# Patient Record
Sex: FEMALE | Race: WHITE | URBAN - METROPOLITAN AREA
[De-identification: names, ages, dates, MRNs, and addresses within clinical notes are randomized per-mention and may not be internally consistent; named-entity substitution may affect disease eponyms.]

---

## 2023-03-04 PROCEDURE — 99239 HOSP IP/OBS DSCHRG MGMT >30: CPT | Performed by: INTERNAL MEDICINE

## 2023-09-28 ENCOUNTER — OFFICE VISIT (OUTPATIENT)
Dept: PRIMARY CARE | Facility: CLINIC | Age: 63
End: 2023-09-28
Payer: COMMERCIAL

## 2023-09-28 VITALS
TEMPERATURE: 97.3 F | HEART RATE: 81 BPM | DIASTOLIC BLOOD PRESSURE: 78 MMHG | BODY MASS INDEX: 16.22 KG/M2 | OXYGEN SATURATION: 96 % | SYSTOLIC BLOOD PRESSURE: 129 MMHG | WEIGHT: 107 LBS | HEIGHT: 68 IN

## 2023-09-28 DIAGNOSIS — K21.9 GASTROESOPHAGEAL REFLUX DISEASE WITHOUT ESOPHAGITIS: ICD-10-CM

## 2023-09-28 DIAGNOSIS — G47.00 INSOMNIA, UNSPECIFIED TYPE: ICD-10-CM

## 2023-09-28 DIAGNOSIS — Z13.820 SCREENING FOR OSTEOPOROSIS: ICD-10-CM

## 2023-09-28 DIAGNOSIS — I10 BENIGN ESSENTIAL HYPERTENSION: ICD-10-CM

## 2023-09-28 DIAGNOSIS — Z12.31 ENCOUNTER FOR SCREENING MAMMOGRAM FOR MALIGNANT NEOPLASM OF BREAST: ICD-10-CM

## 2023-09-28 DIAGNOSIS — R11.0 CHRONIC NAUSEA: ICD-10-CM

## 2023-09-28 DIAGNOSIS — F17.210 CIGARETTE SMOKER: ICD-10-CM

## 2023-09-28 DIAGNOSIS — I10 HYPERTENSION, UNSPECIFIED TYPE: ICD-10-CM

## 2023-09-28 DIAGNOSIS — Z12.2 ENCOUNTER FOR SCREENING FOR LUNG CANCER: ICD-10-CM

## 2023-09-28 DIAGNOSIS — Z00.01 ANNUAL VISIT FOR GENERAL ADULT MEDICAL EXAMINATION WITH ABNORMAL FINDINGS: Primary | ICD-10-CM

## 2023-09-28 PROBLEM — E78.5 HYPERLIPIDEMIA: Status: RESOLVED | Noted: 2023-09-28 | Resolved: 2023-09-28

## 2023-09-28 PROBLEM — E78.5 HYPERLIPIDEMIA: Status: ACTIVE | Noted: 2023-09-28

## 2023-09-28 LAB
NON-UH HIE A/G RATIO: 1.3
NON-UH HIE ALB: 4.3 G/DL (ref 3.4–5)
NON-UH HIE ALK PHOS: 59 UNIT/L (ref 46–116)
NON-UH HIE APPEARANCE, U: CLEAR
NON-UH HIE BASO COUNT: 0.02 X1000 (ref 0–0.2)
NON-UH HIE BASOS %: 0.3 %
NON-UH HIE BILIRUBIN, TOTAL: 0.4 MG/DL (ref 0.2–1)
NON-UH HIE BILIRUBIN, U: NEGATIVE
NON-UH HIE BLOOD, U: NEGATIVE
NON-UH HIE BUN/CREAT RATIO: 18.6
NON-UH HIE BUN: 13 MG/DL (ref 9–23)
NON-UH HIE CALCIUM: 10.2 MG/DL (ref 8.7–10.4)
NON-UH HIE CALCULATED LDL CHOLESTEROL: 94 MG/DL (ref 60–130)
NON-UH HIE CALCULATED OSMOLALITY: 285 MOSM/KG (ref 275–295)
NON-UH HIE CHLORIDE: 106 MMOL/L (ref 98–107)
NON-UH HIE CHOLESTEROL: 197 MG/DL (ref 100–200)
NON-UH HIE CO2, VENOUS: 30 MMOL/L (ref 20–31)
NON-UH HIE COLOR, U: YELLOW
NON-UH HIE CREATININE, URINE MG/DL: 93.6 MG/DL
NON-UH HIE CREATININE: 0.7 MG/DL (ref 0.5–0.8)
NON-UH HIE DIFF?: NO
NON-UH HIE EOS COUNT: 0.18 X1000 (ref 0–0.5)
NON-UH HIE EOSIN %: 2.6 %
NON-UH HIE GFR AA: >60
NON-UH HIE GLOBULIN: 3.2 G/DL
NON-UH HIE GLOMERULAR FILTRATION RATE: >60 ML/MIN/1.73M?
NON-UH HIE GLUCOSE QUAL, U: NEGATIVE
NON-UH HIE GLUCOSE: 98 MG/DL (ref 74–106)
NON-UH HIE GOT: 18 UNIT/L (ref 15–37)
NON-UH HIE GPT: 14 UNIT/L (ref 10–49)
NON-UH HIE HCT: 41.5 % (ref 36–46)
NON-UH HIE HDL CHOLESTEROL: 77 MG/DL (ref 40–60)
NON-UH HIE HGB A1C: 5.3 %
NON-UH HIE HGB: 14.1 G/DL (ref 12–16)
NON-UH HIE INSTR WBC: 7
NON-UH HIE K: 4.5 MMOL/L (ref 3.5–5.1)
NON-UH HIE KETONES, U: NEGATIVE
NON-UH HIE LEUKOCYTE ESTERASE, U: ABNORMAL
NON-UH HIE LYMPH %: 28.6 %
NON-UH HIE LYMPH COUNT: 2 X1000 (ref 1.2–4.8)
NON-UH HIE MCH: 34.3 PG (ref 27–34)
NON-UH HIE MCHC: 34 G/DL (ref 32–37)
NON-UH HIE MCV: 100.8 FL (ref 80–100)
NON-UH HIE MICROALBUMIN, URINE MG/L: 7 MG/L
NON-UH HIE MICROALBUMIN/CREATININE RATIO: 8 MG MALB/GM CREAT (ref 0–30)
NON-UH HIE MONO %: 7.7 %
NON-UH HIE MONO COUNT: 0.54 X1000 (ref 0.1–1)
NON-UH HIE MPV: 9 FL (ref 7.4–10.4)
NON-UH HIE MUCOUS, U: ABNORMAL
NON-UH HIE NA: 143 MMOL/L (ref 135–145)
NON-UH HIE NEUTROPHIL %: 60.7 %
NON-UH HIE NEUTROPHIL COUNT (ANC): 4.25 X1000 (ref 1.4–8.8)
NON-UH HIE NITRITE, U: NEGATIVE
NON-UH HIE NUCLEATED RBC: 0 /100WBC
NON-UH HIE PH, U: 6 (ref 4.5–8)
NON-UH HIE PLATELET: 261 X10 (ref 150–450)
NON-UH HIE PROTEIN, U: NEGATIVE
NON-UH HIE RBC/HPF, U: 2 #/HPF (ref 0–3)
NON-UH HIE RBC: 4.12 X10 (ref 4.2–5.4)
NON-UH HIE RDW: 13.2 % (ref 11.5–14.5)
NON-UH HIE SPECIFIC GRAVITY, U: 1.01 (ref 1–1.03)
NON-UH HIE SQUAMOUS EPITHELIAL CELLS, U: 1 #/HPF
NON-UH HIE TOTAL CHOL/HDL CHOL RATIO: 2.6
NON-UH HIE TOTAL PROTEIN: 7.5 G/DL (ref 5.7–8.2)
NON-UH HIE TRIGLYCERIDES: 132 MG/DL (ref 30–150)
NON-UH HIE TSH: 1.32 UIU/ML (ref 0.55–4.78)
NON-UH HIE U MICRO: ABNORMAL
NON-UH HIE URIC ACID: 4.5 MG/DL (ref 3.1–7.8)
NON-UH HIE UROBILINOGEN QUAL, U: ABNORMAL
NON-UH HIE WBC/HPF, U: 11 #/HPF (ref 0–5)
NON-UH HIE WBC: 7 X10 (ref 4.5–11)

## 2023-09-28 PROCEDURE — 3074F SYST BP LT 130 MM HG: CPT | Performed by: INTERNAL MEDICINE

## 2023-09-28 PROCEDURE — 99406 BEHAV CHNG SMOKING 3-10 MIN: CPT | Performed by: INTERNAL MEDICINE

## 2023-09-28 PROCEDURE — 4004F PT TOBACCO SCREEN RCVD TLK: CPT | Performed by: INTERNAL MEDICINE

## 2023-09-28 PROCEDURE — 99396 PREV VISIT EST AGE 40-64: CPT | Performed by: INTERNAL MEDICINE

## 2023-09-28 PROCEDURE — 3078F DIAST BP <80 MM HG: CPT | Performed by: INTERNAL MEDICINE

## 2023-09-28 PROCEDURE — 99213 OFFICE O/P EST LOW 20 MIN: CPT | Performed by: INTERNAL MEDICINE

## 2023-09-28 RX ORDER — ALBUTEROL SULFATE 90 UG/1
2 AEROSOL, METERED RESPIRATORY (INHALATION) EVERY 4 HOURS PRN
COMMUNITY
End: 2023-11-20 | Stop reason: SDUPTHER

## 2023-09-28 RX ORDER — VITAMIN E MIXED 400 UNIT
CAPSULE ORAL DAILY
COMMUNITY
End: 2023-11-20 | Stop reason: WASHOUT

## 2023-09-28 RX ORDER — MELOXICAM 7.5 MG/1
7.5 TABLET ORAL DAILY PRN
COMMUNITY
End: 2023-11-20 | Stop reason: WASHOUT

## 2023-09-28 RX ORDER — VIT C/E/ZN/COPPR/LUTEIN/ZEAXAN 250MG-90MG
25 CAPSULE ORAL DAILY
COMMUNITY
End: 2023-11-20 | Stop reason: WASHOUT

## 2023-09-28 RX ORDER — ZOLPIDEM TARTRATE 10 MG/1
10 TABLET ORAL NIGHTLY PRN
Qty: 30 TABLET | Refills: 0 | Status: SHIPPED | OUTPATIENT
Start: 2023-09-28 | End: 2023-11-20 | Stop reason: SDUPTHER

## 2023-09-28 RX ORDER — AMLODIPINE BESYLATE 5 MG/1
5 TABLET ORAL DAILY
COMMUNITY
End: 2023-12-15

## 2023-09-28 RX ORDER — CLOTRIMAZOLE AND BETAMETHASONE DIPROPIONATE 10; .64 MG/G; MG/G
CREAM TOPICAL
COMMUNITY
End: 2023-11-20 | Stop reason: WASHOUT

## 2023-09-28 RX ORDER — PANTOPRAZOLE SODIUM 40 MG/1
40 TABLET, DELAYED RELEASE ORAL DAILY
Qty: 30 TABLET | Refills: 1 | Status: SHIPPED | OUTPATIENT
Start: 2023-09-28 | End: 2023-11-20

## 2023-09-28 ASSESSMENT — PATIENT HEALTH QUESTIONNAIRE - PHQ9
1. LITTLE INTEREST OR PLEASURE IN DOING THINGS: NOT AT ALL
SUM OF ALL RESPONSES TO PHQ9 QUESTIONS 1 AND 2: 0
2. FEELING DOWN, DEPRESSED OR HOPELESS: NOT AT ALL
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
## 2023-09-29 ENCOUNTER — TELEPHONE (OUTPATIENT)
Dept: PRIMARY CARE | Facility: CLINIC | Age: 63
End: 2023-09-29
Payer: COMMERCIAL

## 2023-09-29 DIAGNOSIS — N39.0 URINARY TRACT INFECTION WITHOUT HEMATURIA, SITE UNSPECIFIED: ICD-10-CM

## 2023-09-29 RX ORDER — CEPHALEXIN 500 MG/1
500 CAPSULE ORAL 2 TIMES DAILY
Qty: 14 CAPSULE | Refills: 0 | Status: SHIPPED | OUTPATIENT
Start: 2023-09-29 | End: 2023-10-06

## 2023-10-09 ENCOUNTER — TELEPHONE (OUTPATIENT)
Dept: PRIMARY CARE | Facility: CLINIC | Age: 63
End: 2023-10-09
Payer: COMMERCIAL

## 2023-10-09 NOTE — TELEPHONE ENCOUNTER
Radiology called asking for order for CT abdomen/pelvis but no order in chart. Please review.   Fax to [phone] Attn: Denisa.

## 2023-10-24 ENCOUNTER — HOSPITAL ENCOUNTER (OUTPATIENT)
Dept: RADIOLOGY | Facility: HOSPITAL | Age: 63
Discharge: HOME | End: 2023-10-24
Payer: COMMERCIAL

## 2023-10-24 DIAGNOSIS — I10 HYPERTENSION, UNSPECIFIED TYPE: ICD-10-CM

## 2023-10-24 PROCEDURE — 75571 CT HRT W/O DYE W/CA TEST: CPT

## 2023-10-27 ENCOUNTER — APPOINTMENT (OUTPATIENT)
Dept: PRIMARY CARE | Facility: CLINIC | Age: 63
End: 2023-10-27
Payer: COMMERCIAL

## 2023-11-17 ENCOUNTER — TELEPHONE (OUTPATIENT)
Dept: PRIMARY CARE | Facility: CLINIC | Age: 63
End: 2023-11-17
Payer: COMMERCIAL

## 2023-11-17 NOTE — TELEPHONE ENCOUNTER
----- Message from Frances Valentin MD sent at 11/17/2023 12:08 PM EST -----  Prolia 60 mg subcu every 6 months #2 bring injection in the office

## 2023-11-20 ENCOUNTER — HOSPITAL ENCOUNTER (OUTPATIENT)
Dept: RADIOLOGY | Facility: EXTERNAL LOCATION | Age: 63
Discharge: HOME | End: 2023-11-20

## 2023-11-20 ENCOUNTER — OFFICE VISIT (OUTPATIENT)
Dept: PRIMARY CARE | Facility: CLINIC | Age: 63
End: 2023-11-20
Payer: COMMERCIAL

## 2023-11-20 VITALS
BODY MASS INDEX: 16.52 KG/M2 | OXYGEN SATURATION: 96 % | HEART RATE: 104 BPM | HEIGHT: 68 IN | SYSTOLIC BLOOD PRESSURE: 123 MMHG | WEIGHT: 109 LBS | DIASTOLIC BLOOD PRESSURE: 81 MMHG | TEMPERATURE: 97.6 F

## 2023-11-20 DIAGNOSIS — K21.00 GASTROESOPHAGEAL REFLUX DISEASE WITH ESOPHAGITIS WITHOUT HEMORRHAGE: ICD-10-CM

## 2023-11-20 DIAGNOSIS — Z12.31 ENCOUNTER FOR SCREENING MAMMOGRAM FOR MALIGNANT NEOPLASM OF BREAST: ICD-10-CM

## 2023-11-20 DIAGNOSIS — F17.210 CIGARETTE SMOKER: ICD-10-CM

## 2023-11-20 DIAGNOSIS — I10 BENIGN ESSENTIAL HYPERTENSION: ICD-10-CM

## 2023-11-20 DIAGNOSIS — Z12.2 ENCOUNTER FOR SCREENING FOR LUNG CANCER: ICD-10-CM

## 2023-11-20 DIAGNOSIS — G47.00 INSOMNIA, UNSPECIFIED TYPE: Primary | ICD-10-CM

## 2023-11-20 DIAGNOSIS — I10 HYPERTENSION, UNSPECIFIED TYPE: ICD-10-CM

## 2023-11-20 DIAGNOSIS — K21.9 GASTROESOPHAGEAL REFLUX DISEASE WITHOUT ESOPHAGITIS: ICD-10-CM

## 2023-11-20 PROBLEM — R11.0 CHRONIC NAUSEA: Status: RESOLVED | Noted: 2023-09-28 | Resolved: 2023-11-20

## 2023-11-20 PROCEDURE — 3079F DIAST BP 80-89 MM HG: CPT | Performed by: INTERNAL MEDICINE

## 2023-11-20 PROCEDURE — 3074F SYST BP LT 130 MM HG: CPT | Performed by: INTERNAL MEDICINE

## 2023-11-20 PROCEDURE — 99214 OFFICE O/P EST MOD 30 MIN: CPT | Performed by: INTERNAL MEDICINE

## 2023-11-20 PROCEDURE — 99406 BEHAV CHNG SMOKING 3-10 MIN: CPT | Performed by: INTERNAL MEDICINE

## 2023-11-20 PROCEDURE — 4004F PT TOBACCO SCREEN RCVD TLK: CPT | Performed by: INTERNAL MEDICINE

## 2023-11-20 RX ORDER — BUPROPION HYDROCHLORIDE 150 MG/1
150 TABLET, EXTENDED RELEASE ORAL 2 TIMES DAILY
Qty: 60 TABLET | Refills: 1 | Status: SHIPPED | OUTPATIENT
Start: 2023-11-20 | End: 2024-01-11

## 2023-11-20 RX ORDER — ZOLPIDEM TARTRATE 10 MG/1
10 TABLET ORAL NIGHTLY PRN
Qty: 30 TABLET | Refills: 2 | Status: SHIPPED | OUTPATIENT
Start: 2023-11-20 | End: 2024-03-21 | Stop reason: SDUPTHER

## 2023-11-20 RX ORDER — ALBUTEROL SULFATE 90 UG/1
2 AEROSOL, METERED RESPIRATORY (INHALATION) EVERY 4 HOURS PRN
Qty: 18 G | Refills: 6 | Status: SHIPPED | OUTPATIENT
Start: 2023-11-20

## 2023-11-20 RX ORDER — PANTOPRAZOLE SODIUM 40 MG/1
40 TABLET, DELAYED RELEASE ORAL DAILY
Qty: 90 TABLET | Refills: 1 | Status: SHIPPED | OUTPATIENT
Start: 2023-11-20 | End: 2024-06-03

## 2023-11-20 NOTE — PROGRESS NOTES
"Subjective   Patient ID: Princess Nevarez \"Collin" is a 62 y.o. female who presents for Follow-up (1 month /Go over Dexa scan ).    Assessment/Plan     Problem List Items Addressed This Visit       Benign essential hypertension    Cigarette smoker    Relevant Medications    albuterol 90 mcg/actuation inhaler    buPROPion SR (Wellbutrin SR) 150 mg 12 hr tablet    Other Relevant Orders    Albumin , Urine Random    CBC and Auto Differential    Comprehensive Metabolic Panel    Hemoglobin A1C    Lipid Panel    Uric Acid    TSH with reflex to Free T4 if abnormal    GERD (gastroesophageal reflux disease)    Insomnia    Relevant Medications    zolpidem (Ambien) 10 mg tablet    Other Relevant Orders    Albumin , Urine Random    CBC and Auto Differential    Comprehensive Metabolic Panel    Hemoglobin A1C    Lipid Panel    Uric Acid    TSH with reflex to Free T4 if abnormal    Encounter for screening for lung cancer - Primary    Relevant Orders    Albumin , Urine Random    CBC and Auto Differential    Comprehensive Metabolic Panel    Hemoglobin A1C    Lipid Panel    Uric Acid    TSH with reflex to Free T4 if abnormal    CT lung screening low dose     Other Visit Diagnoses       Hypertension, unspecified type        Relevant Orders    Albumin , Urine Random    CBC and Auto Differential    Comprehensive Metabolic Panel    Hemoglobin A1C    Lipid Panel    Uric Acid    TSH with reflex to Free T4 if abnormal        I personally reviewed the O[facility] report for this patient. This report is scanned into the electronic medical record. I have considered  the risks of abuse, dependence, addiction and diversion. After discussion, patient does have a good understanding of the safety and  risks of this medication. I believe that it is clinically appropriate for this patient to be prescribed this medication.  See patient back in 6 weeks.  Patient was evaluated today, problem list was reviewed, problems and concerns addressed, Rx list reviewed " and updated, lab and tests were noted and reviewed. Life style changes were discussed, always it works better if we eat plant based diet and plenty of fibres and roughage. Consume adequate amount of water and avoid alcohol, light to moderate physical activities and stress reduction are always beneficial for ongoing physical well being. Do not forget to have 6 to 7 hours of sleep regularly and avoid late night alicia screen exposure.    HPI  67-year-old patient continue to smoke and drink history of COPD hypertension hyperlipidemia anxiety depression weight loss chronic lung disease from smoking and drinking advised of smoking and drinking given nicotine patch B12 folic acid thiamine and Bhupathi      COPD Trelegy    Hypertension amlodipine    Osteoarthritis meloxicam    Gastritis Protonix    Insomnia melatonin Ambien    History of the smoking advised CAT scan of the lung CAT scan of the heart refer patient to GI for EGD colonoscopy    BMI 63 cachexia refer to nutrition and oncology rule out any occult malignancy    I spent 10 minutes counseling patient about need for smoking/tobacco cessation and support discuss the nicotine  replacement therapy ,varenicline,bupropion,  hypnosis, support group, acupuncture as a potential options  .  I discussed smoking history/status that determined patient with criteria for lung cancer screening with a low-dose CT scan. By using shared decision  making we  determinded the patient will benefit from the screening, including discussion of benefit and harms of screening, follow-up diagnostic testing, overt diagnosis, false positive rate, and total radiation exposure. I counseled the patient on the importance of adhering to a annul lung cancer low-dose CT scan screening, the impact off comorbidities, and inability or unwillingness to undergo's diagnosis and treatment if abnormality discovered. I provided patient an order for low-dose CT lung cancer screening    I spent 8 minutes counseling  the patient on the importance of abstaining from the tobacco/cigarette smoking and  provided information about tobacco cessation intervention I provided patient an order for tobacco suggestion therapy    I spent 10 minutes counseling patient about need for smoking/tobacco cessation and support discuss the nicotine  replacement therapy ,varenicline,bupropion,  hypnosis, support group, acupuncture as a potential options  .  Past Medical History:   Diagnosis Date    Anxiety disorder, unspecified 03/19/2020    Anxiety and depression    Bipolar disorder, currently in remission, most recent episode unspecified (CMS/[facility]) 03/19/2020    History of depressed bipolar disorder    Displaced fracture of head of unspecified radius, initial encounter for closed fracture 11/30/2021    Radial head fracture    Dorsalgia, unspecified 03/19/2020    Chronic back pain greater than 3 months duration    Encounter for screening for malignant neoplasm of colon 12/15/2020    Screen for colon cancer    Encounter for screening for malignant neoplasm of rectum 12/15/2020    Screening for rectal cancer    Essential (primary) hypertension 03/19/2020    Benign essential hypertension    Hematuria, unspecified 11/30/2021    Painless hematuria    Hypothyroidism, unspecified 03/19/2020    Acquired hypothyroidism    Nicotine dependence, cigarettes, uncomplicated 10/29/2020    Continuous dependence on cigarette smoking    Pain in right arm 03/21/2017    Arm pain, right    Pain in unspecified shoulder 03/19/2020    Chronic pain in shoulder    Personal history of other diseases of the circulatory system 03/19/2020    History of hypertension    Personal history of other diseases of the digestive system 03/19/2020    History of gastroesophageal reflux (GERD)    Personal history of other diseases of the musculoskeletal system and connective tissue 10/29/2020    History of arthritis    Personal history of other diseases of the nervous system and sense organs  11/30/2021    History of restless legs syndrome    Personal history of other diseases of the nervous system and sense organs 03/19/2020    History of restless legs syndrome    Personal history of other diseases of the nervous system and sense organs 03/19/2020    History of restless legs syndrome    Personal history of other diseases of the respiratory system 10/13/2022    History of acute bronchitis    Personal history of other diseases of the respiratory system 10/11/2022    History of acute sinusitis    Personal history of other mental and behavioral disorders 08/08/2016    History of anxiety    Personal history of other specified conditions 11/30/2021    History of insomnia    Personal history of other venous thrombosis and embolism 03/19/2020    History of deep venous thrombosis    Personal history of pulmonary embolism 03/19/2020    History of pulmonary embolism    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 18 g 6    amLODIPine (Norvasc) 5 mg tablet Take 1 tablet (5 mg) by mouth once daily.      buPROPion SR (Wellbutrin SR) 150 mg 12 hr tablet Take 1 tablet (150 mg) by mouth 2 times a day. Do not crush, chew, or split. 60 tablet 1    pantoprazole (ProtoNix) 40 mg EC tablet TAKE 1 TABLET BY MOUTH ONCE DAILY 90 tablet 1    zolpidem (Ambien) 10 mg tablet Take 1 tablet (10 mg) by mouth as needed at bedtime for sleep. 30  tablet 2     No current facility-administered medications for this visit.     Family History   Problem Relation Name Age of Onset    Hypertension Mother      Heart disease Mother      Heart attack Father       Social History     Socioeconomic History    Marital status:      Spouse name: None    Number of children: None    Years of education: None    Highest education level: None   Occupational History    None   Tobacco Use    Smoking status: Some Days     Years: 20     Types: Cigarettes    Smokeless tobacco: Never    Tobacco comments:     patch   Substance and Sexual Activity    Alcohol use: Not Currently     Alcohol/week: 0.0 - 4.0 standard drinks of alcohol    Drug use: Never    Sexual activity: None   Other Topics Concern    None   Social History Narrative    None     Social Determinants of Health     Financial Resource Strain: Not on file   Food Insecurity: Not on file   Transportation Needs: Not on file   Physical Activity: Not on file   Stress: Not on file   Social Connections: Not on file   Intimate Partner Violence: Not on file   Housing Stability: Not on file     Immunization History   Administered Date(s) Administered    Flu vaccine (IIV4), preservative free *Check age/dose* 09/17/2017, 09/02/2018, 09/07/2019, 08/16/2020, 09/10/2021, 07/30/2022, 09/21/2023    Hepatitis A vaccine, age 19 years and greater (HAVRIX) 07/25/2018, 04/08/2019    Hepatitis B vaccine, adult (HEPLISAV) 08/16/2022, 05/12/2023    Hepatitis B vaccine, adult (RECOMBIVAX, ENGERIX) 08/16/2022    Influenza, Unspecified 12/20/2011, 09/21/2012, 09/23/2013, 09/10/2021    Influenza, seasonal, injectable 08/27/2020    Influenza, seasonal, injectable, preservative free 10/08/2016    MMR vaccine, subcutaneous (MMR II) 08/23/2022, 07/03/2023    Moderna COVID-19 vaccine, Fall 2023, 12 yeasrs and older (50mcg/0.5mL) 09/22/2023    Moderna SARS-CoV-2 Vaccination 02/11/2021, 03/10/2021, 10/29/2021, 04/18/2022    Pfizer COVID-19 vaccine,  "bivalent, age 12 years and older (30 mcg/0.3 mL) 09/17/2022, 05/12/2023    Pfizer Purple Cap SARS-CoV-2 09/17/2022, 08/19/2023    Pneumococcal Conjugate PCV 7 10/31/2020    Pneumococcal conjugate vaccine, 13-valent (PREVNAR 13) 10/31/2020    Pneumococcal conjugate vaccine, 20-valent (PREVNAR 20) 07/06/2022    Pneumococcal polysaccharide vaccine, 23-valent, age 2 years and older (PNEUMOVAX 23) 12/20/2011, 09/02/2018, 07/03/2023    Poliovirus vaccine, subcutaneous (IPOL) 08/17/2022, 07/03/2023    RESPIRATORY SYNCYTIAL VIRUS (RSV), ELIGIBLE PREGNANT PTS, 0.5 ML (ABRYSVO) 08/19/2023    Td vaccine, age 7 years and older (TENIVAC) 05/12/2023    Tdap vaccine, age 7 year and older (BOOSTRIX) 04/14/2019    Varicella vaccine, subcutaneous (VARIVAX) 07/03/2023    Zoster vaccine, recombinant, adult (SHINGRIX) 08/16/2020, 10/31/2020       Review of Systems  Review of systems is otherwise negative unless stated above or in history of present illness.    Objective   Visit Vitals  /81 (BP Location: Left arm, Patient Position: Sitting, BP Cuff Size: Adult)   Pulse 104   Temp 36.4 °C (97.6 °F)   Ht 1.727 m (5' 8\")   Wt 49.4 kg (109 lb)   SpO2 96%   BMI 16.57 kg/m²   Smoking Status Some Days   BSA 1.54 m²     Physical Exam  Constitutional:       General: not in acute distress.   HENT:      Head: Normocephalic and atraumatic.      Nose: Nose normal.   Eyes:      Extraocular Movements: Extraocular movements intact.      Conjunctiva/sclera: Conjunctivae normal.   Cardiovascular:      Rate and Rhythm: Normal rate ,  No M/R/G  Pulmonary:      Effort: Pulmonary effort is normal.      Breath sounds: Normal, Bilat Equal AE  Skin:     General: Skin is warm.   Neurological:      Mental Status: He is alert and oriented to person, place, and time.   Psychiatric:         Mood and Affect: Mood normal.         Behavior: Behavior normal.   Musculoskeletal   FROM in all extremitirs,  Joint-no swelling or tenderness    Orders Only on 09/28/2023 "   Component Date Value Ref Range Status    [facility] RBC/HPF, U 09/28/2023 2  0 - 3 #/HPF Final    [facility] Blood, U 09/28/2023 Negative  Negative Final    [facility] Leukocyte Esterase, U 09/28/2023 Large (A)  Negative Final    [facility] Ketones, U 09/28/2023 Negative  Negative Final    [facility] Mucous, U 09/28/2023 Occasional   Final    [facility] Urobilinogen Qual, U 09/28/2023 <2.0 mg/dl  <2.0 mg/dl Final    [facility] Glucose Qual, U 09/28/2023 Negative  Negative Final    [facility] WBC/HPF, U 09/28/2023 11 (H)  0 - 5 #/HPF Final    [facility] Color, U 09/28/2023 Yellow   Final    [facility] pH, U 09/28/2023 6.0  4.5 - 8.0 Final    [facility] U MICRO 09/28/2023 Indicated   Final    [facility] Specific Gravity, U 09/28/2023 1.013  1.001 - 1.035 Final    [facility] Nitrite, U 09/28/2023 Negative  Negative Final    [facility] Bilirubin, U 09/28/2023 Negative  Negative Final    [facility] Protein, U 09/28/2023 Negative  Negative Final    [facility] Squamous Epithelial Ocrrie* 09/28/2023 1  #/HPF Final    [facility] Appearance, U 09/28/2023 Clear   Final    [facility] HCT 09/28/2023 41.5  36.0 - 46.0 % Final    [facility] Platelet 09/28/2023 261  150 - 450 x10 Final    [facility] MCHC 09/28/2023 34.0  32.0 - 37.0 g/dL Final    [facility] RBC 09/28/2023 4.12 (L)  4.20 - 5.40 x10 Final    [facility] Instr WBC 09/28/2023 7.0   Final    [facility] MCV 09/28/2023 100.8 (H)  80.0 - 100.0 fL Final    [facility] MPV 09/28/2023 9.0  7.4 - 10.4 fL Final    [facility] HGB 09/28/2023 14.1  12.0 - 16.0 g/dL Final    [facility] RDW 09/28/2023 13.2  11.5 - 14.5 % Final    [facility] WBC 09/28/2023 7.0  4.5 - 11.0 x10 Final    [facility] MCH 09/28/2023 34.3 (H)  27.0 - 34.0 pg Final    [facility] Nucleated RBC 09/28/2023 0  /100WBC Final    [facility] DIFF? 09/28/2023 No   Final    [facility] Eos Count 09/28/2023 0.18  0.00 - 0.50 x1000 Final    [facility] Eosin % 09/28/2023 2.6  % Final    [facility] Lymph % 09/28/2023 28.6  %  Final    [facility] Lymph Count 09/28/2023 2.00  1.20 - 4.80 x1000 Final    [facility] Basos % 09/28/2023 0.3  % Final    [facility] Baso Count 09/28/2023 0.02  0.00 - 0.20 x1000 Final    [facility] Mono Count 09/28/2023 0.54  0.10 - 1.00 x1000 Final    [facility] Mono % 09/28/2023 7.7  % Final    [facility] Neutrophil % 09/28/2023 60.7  % Final    [facility] Neutrophil Count (ANC) 09/28/2023 4.25  1.40 - 8.80 x1000 Final    [facility] Uric Acid 09/28/2023 4.5  3.1 - 7.8 mg/dL Final    [facility] TSH 09/28/2023 1.32  0.55 - 4.78 uIU/ml Final    [facility] GFR AA 09/28/2023 >60   Final    [facility] Bilirubin, Total 09/28/2023 0.40  0.20 - 1.00 mg/dL Final    [facility] Chloride 09/28/2023 106  98 - 107 mmol/L Final    [facility] A/G Ratio 09/28/2023 1.3   Final    [facility] BUN/Creat Ratio 09/28/2023 18.6   Final    [facility] Na 09/28/2023 143  135 - 145 mmol/L Final    [facility] GPT 09/28/2023 14  10 - 49 unit/L Final    [facility] Alk Phos 09/28/2023 59  46 - 116 unit/L Final    [facility] Creatinine 09/28/2023 0.7  0.5 - 0.8 mg/dL Final    [facility] Total Protein 09/28/2023 7.5  5.7 - 8.2 g/dL Final    [facility] CO2, venous 09/28/2023 30.0  20.0 - 31.0 mmol/L Final    [facility] Glomerular Filtration R* 09/28/2023 >60  mL/min/1.73m? Final    [facility] Calculated Osmolality 09/28/2023 285  275 - 295 mOsm/kg Final    [facility] K 09/28/2023 4.5  3.5 - 5.1 mmol/L Final    [facility] Globulin 09/28/2023 3.2  g/dL Final    [facility] BUN 09/28/2023 13  9 - 23 mg/dL Final    [facility] Calcium 09/28/2023 10.2  8.7 - 10.4 mg/dL Final    [facility] GOT 09/28/2023 18  15 - 37 unit/L Final    [facility] ALB 09/28/2023 4.3  3.4 - 5.0 g/dL Final    [facility] Glucose 09/28/2023 98  74 - 106 mg/dL Final    [facility] Calculated LDL Choleste* 09/28/2023 94  60 - 130 mg/dL Final    [facility] HDL Cholesterol 09/28/2023 77 (H)  40 - 60 mg/dL Final    [facility] Total Chol/HDL Chol Rat* 09/28/2023 2.6   Final    [facility]  Triglycerides 09/28/2023 132  30 - 150 mg/dL Final    [facility] Cholesterol 09/28/2023 197  100 - 200 mg/dL Final    [facility] Microalbumin, Urine mg/L 09/28/2023 7.0  mg/L Final    [facility] Microalbumin/Creatinine* 09/28/2023 8  0 - 30 mg MALB/gm CREAT Final    [facility] Creatinine, Urine mg/dl 09/28/2023 93.6  mg/dL Final    [facility] HGB A1C 09/28/2023 5.3  % Final       Radiology: Reviewed imaging in powerchart.  BI mammo bilateral screening tomosynthesis    Result Date: 11/20/2023  These images are not reportable by radiology and will not be interpreted by  Radiologists.    XR DEXA bone density    Result Date: 11/15/2023  DUAL X-RAY ABSORPTIOMETRY - DXA CLINICAL INDICATIONS:  Osteopenia.  COMPARISON:  6/30/2014 TECHNIQUE:  Dual x-ray absorptiometry (DXA) was performed using [employer].   Bone mineral density (BMD), T-score (young normal) and Z-score (age-matched normal) are reported. FINDINGS: Lumbar spine (L1-L4) BMD:  0.750 g/cm2, T-score is -2.7, and Z-score is -1.0.  14.4% decrease compared to prior Left femoral neck density:  0.518 g/cm2, T-score is -3.0 and Z-score is -1.5. Left total hip density:  0.624 g/cm2, T-score is -2.6 and Z-score is -1.4. IMPRESSION: Osteopenia based on the femoral neck and total hip densities as established by the WHO criteria. FRAX-WHO fracture risk assessment tool: 10-YEAR FRACTURE RISK:      -For a major Osteoporotic Fracture is 13%        -For a hip Fracture is 5.5%        Fracture probability calculated for an untreated patient.   Fracture probability may be lower if the patient has received treatment. According to WHO, biphosphonate therapy is indicated if the 10 year risk for a major osteoporotic fracture is greater than or equal to 20% or the 10 year risk for a hip fracture is greater than or equal to 3%. ___________________________ GENERAL GUIDELINES:  According to WHO guidelines, a T-score of -1.0 or greater is defined as normal, between -1.0 and -2.5 is defined  as osteopenia, and -2.5 or less is defined as osteoporosis.   Z-score (instead of T-score) is preferred for pediatrics, premenopausal women, and men under the age of 50 years.  BMDCS (Bone Mineral Density in Childhood Study) and HOLOGIC reference databases are used for pediatric DXA.   At this facility, change in bone mineral density of 0.22g/cm2 for the spine, 0.027g/cm2 for the hip and 0.023g/cm2 for the forearm are considered statistically significant (least significant change, LSC). ___________________________ The National Osteoporosis Foundation recommends treatment for patients with BMD scores <-2.5 unless clinical conditions suggest otherwise.   Treatment of patients with other BMD scores should be based on that patient's individual risk factors.   Specific guidelines for preventing fractures associated with steroid therapy can be obtained using American College of Rheumatology recommendations. ___________________________ FOLLOW-UP: Patients with diagnosed causes of osteoporosis or at high risk for fracture should have regular bone mineral density tests.   For patients eligible for Medicare, routine testing is allowed once every 2 years.   Testing frequency can be increased to one year for patients that have rapidly progressing disease, those who are receiving or discontinuing medical therapy to restore bone mass, or have additional risk factors. ____________________ This report was created using voice recognition software and/or free text entry.  I have reviewed this report but may have inadvertently missed correcting erroneous or unusual sound-a-like words.  Electronically signed by:  Suraj Warren MD  11/17/2023 09:46 AM EST Workstation: [id number] Technologist:  TSR Dictated By:   SURAJ WARREN MD Signed By:     SURAJ WARREN MD Signed Out:    11/17/23 09:46:49        Charting was completed using voice recognition technology and may include unintended errors.

## 2023-12-15 DIAGNOSIS — I10 BENIGN ESSENTIAL HYPERTENSION: ICD-10-CM

## 2023-12-15 RX ORDER — AMLODIPINE BESYLATE 5 MG/1
5 TABLET ORAL DAILY
Qty: 90 TABLET | Refills: 1 | Status: SHIPPED | OUTPATIENT
Start: 2023-12-15 | End: 2024-06-03

## 2023-12-19 ENCOUNTER — TELEPHONE (OUTPATIENT)
Dept: PRIMARY CARE | Facility: CLINIC | Age: 63
End: 2023-12-19
Payer: COMMERCIAL

## 2023-12-19 DIAGNOSIS — J18.9 PNEUMONIA DUE TO INFECTIOUS ORGANISM, UNSPECIFIED LATERALITY, UNSPECIFIED PART OF LUNG: ICD-10-CM

## 2023-12-19 RX ORDER — METHYLPREDNISOLONE 4 MG/1
TABLET ORAL
Qty: 21 TABLET | Refills: 0 | Status: SHIPPED | OUTPATIENT
Start: 2023-12-19 | End: 2023-12-26

## 2023-12-19 RX ORDER — AZITHROMYCIN 250 MG/1
TABLET, FILM COATED ORAL
Qty: 6 TABLET | Refills: 0 | Status: SHIPPED | OUTPATIENT
Start: 2023-12-19 | End: 2023-12-24

## 2023-12-19 NOTE — TELEPHONE ENCOUNTER
----- Message from Frances Valentin MD sent at 12/19/2023  8:50 AM EST -----  COPD with atypical pneumonia advised Z-Tom Medrol Dosepak follow-up 1 week calcium score for heart negative

## 2023-12-20 ENCOUNTER — APPOINTMENT (OUTPATIENT)
Dept: RADIOLOGY | Facility: CLINIC | Age: 63
End: 2023-12-20
Payer: COMMERCIAL

## 2024-01-10 DIAGNOSIS — F17.210 CIGARETTE SMOKER: ICD-10-CM

## 2024-01-11 RX ORDER — BUPROPION HYDROCHLORIDE 150 MG/1
150 TABLET, EXTENDED RELEASE ORAL 2 TIMES DAILY
Qty: 60 TABLET | Refills: 1 | Status: SHIPPED | OUTPATIENT
Start: 2024-01-11 | End: 2024-03-07

## 2024-03-07 DIAGNOSIS — F17.210 CIGARETTE SMOKER: ICD-10-CM

## 2024-03-07 RX ORDER — BUPROPION HYDROCHLORIDE 150 MG/1
150 TABLET, EXTENDED RELEASE ORAL 2 TIMES DAILY
Qty: 60 TABLET | Refills: 2 | Status: SHIPPED | OUTPATIENT
Start: 2024-03-07

## 2024-03-13 ENCOUNTER — TELEMEDICINE (OUTPATIENT)
Dept: PRIMARY CARE | Facility: CLINIC | Age: 64
End: 2024-03-13
Payer: COMMERCIAL

## 2024-03-13 DIAGNOSIS — J44.1 COPD EXACERBATION (MULTI): Primary | ICD-10-CM

## 2024-03-13 DIAGNOSIS — J01.90 ACUTE SINUSITIS, RECURRENCE NOT SPECIFIED, UNSPECIFIED LOCATION: ICD-10-CM

## 2024-03-13 PROCEDURE — 99442 PR PHYS/QHP TELEPHONE EVALUATION 11-20 MIN: CPT | Performed by: STUDENT IN AN ORGANIZED HEALTH CARE EDUCATION/TRAINING PROGRAM

## 2024-03-13 RX ORDER — DOXYCYCLINE 100 MG/1
100 CAPSULE ORAL 2 TIMES DAILY
Qty: 14 CAPSULE | Refills: 0 | Status: SHIPPED | OUTPATIENT
Start: 2024-03-13 | End: 2024-03-21 | Stop reason: ALTCHOICE

## 2024-03-13 RX ORDER — PREDNISONE 20 MG/1
40 TABLET ORAL DAILY
Qty: 10 TABLET | Refills: 0 | Status: SHIPPED | OUTPATIENT
Start: 2024-03-13 | End: 2024-03-21 | Stop reason: ALTCHOICE

## 2024-03-13 RX ORDER — FLUTICASONE PROPIONATE 50 MCG
1 SPRAY, SUSPENSION (ML) NASAL DAILY
Qty: 16 G | Refills: 11 | Status: SHIPPED | OUTPATIENT
Start: 2024-03-13 | End: 2025-03-13

## 2024-03-13 NOTE — PROGRESS NOTES
"Subjective   Patient ID: Princess Nevarez \"Collin" is a 63 y.o. female who presents for the following    Assessment/Plan   Acute Sinusitis  Acute Bronchitis likely 2/2 COPD Exacerbation     PLAN  -Prednisone burst with 40mg PO daily x 5 days  -Doxycycline x 7 days   -Flonase PRN   -Continue using PRN Albuterol as needed   -Encourage oral hydration   -Return precautions given. Follow up with PCP     HPI  Virtual or Telephone Consent    A telephone visit (audio only) between the patient (at the originating site) and the provider (at the distant site) was utilized to provide this telehealth service.   Verbal consent was requested and obtained from Princess Nevarez on this date, 03/13/24 for a telehealth visit.     63F presents for acute sick visit. For the past 2 days she has had sore throat, nasal congestion, productive cough and low grade fever. Has checked SPO2 which has been normal. She has been taking Robitussin DM for cough which has helped. COVID vaccinated but has not taken a COVID test.     Denies  chills, weight loss, lightheadedness, dizziness, vision changes,  CP, palpitations, n/v/d, abd pain, black/bloody stools, arthralgias, or new numbness/weakness/tingling in arms/legs/face.        Social Hx  T: quit in Dec 2023  A: denies  D: denies     Visit Vitals  Smoking Status Some Days     PHYSICAL EXAM   Deferred    REVIEW OF SYSTEMS   ROS in HPI     Allergies   Allergen Reactions    Penicillins Other       Current Outpatient Medications   Medication Sig Dispense Refill    albuterol 90 mcg/actuation inhaler Inhale 2 puffs every 4 hours if needed for wheezing. 18 g 6    amLODIPine (Norvasc) 5 mg tablet take 1 tablet by mouth once daily 90 tablet 1    buPROPion SR (Wellbutrin SR) 150 mg 12 hr tablet take 1 tablet by mouth twice a day 60 tablet 2    pantoprazole (ProtoNix) 40 mg EC tablet TAKE 1 TABLET BY MOUTH ONCE DAILY 90 tablet 1    zolpidem (Ambien) 10 mg tablet Take 1 tablet (10 mg) by mouth as needed at " bedtime for sleep. 30 tablet 2     No current facility-administered medications for this visit.       Objective     No visits with results within 4 Month(s) from this visit.   Latest known visit with results is:   Orders Only on 09/28/2023   Component Date Value Ref Range Status    [facility] RBC/HPF, U 09/28/2023 2  0 - 3 #/HPF Final    [facility] Blood, U 09/28/2023 Negative  Negative Final    [facility] Leukocyte Esterase, U 09/28/2023 Large (A)  Negative Final    [facility] Ketones, U 09/28/2023 Negative  Negative Final    [facility] Mucous, U 09/28/2023 Occasional   Final    [facility] Urobilinogen Qual, U 09/28/2023 <2.0 mg/dl  <2.0 mg/dl Final    [facility] Glucose Qual, U 09/28/2023 Negative  Negative Final    [facility] WBC/HPF, U 09/28/2023 11 (H)  0 - 5 #/HPF Final    [facility] Color, U 09/28/2023 Yellow   Final    [facility] pH, U 09/28/2023 6.0  4.5 - 8.0 Final    [facility] U MICRO 09/28/2023 Indicated   Final    [facility] Specific Gravity, U 09/28/2023 1.013  1.001 - 1.035 Final    [facility] Nitrite, U 09/28/2023 Negative  Negative Final    [facility] Bilirubin, U 09/28/2023 Negative  Negative Final    [facility] Protein, U 09/28/2023 Negative  Negative Final    [facility] Squamous Epithelial Corrie* 09/28/2023 1  #/HPF Final    [facility] Appearance, U 09/28/2023 Clear   Final    [facility] HCT 09/28/2023 41.5  36.0 - 46.0 % Final    [facility] Platelet 09/28/2023 261  150 - 450 x10 Final    [facility] MCHC 09/28/2023 34.0  32.0 - 37.0 g/dL Final    [facility] RBC 09/28/2023 4.12 (L)  4.20 - 5.40 x10 Final    [facility] Instr WBC 09/28/2023 7.0   Final    [facility] MCV 09/28/2023 100.8 (H)  80.0 - 100.0 fL Final    [facility] MPV 09/28/2023 9.0  7.4 - 10.4 fL Final    [facility] HGB 09/28/2023 14.1  12.0 - 16.0 g/dL Final    [facility] RDW 09/28/2023 13.2  11.5 - 14.5 % Final    [facility] WBC 09/28/2023 7.0  4.5 - 11.0 x10 Final    [facility] MCH 09/28/2023 34.3 (H)  27.0 - 34.0 pg Final    [facility] Nucleated RBC 09/28/2023 0  /100WBC Final    [facility] DIFF? 09/28/2023 No   Final    [facility] Eos Count 09/28/2023 0.18  0.00 - 0.50 x1000 Final    [facility] Eosin % 09/28/2023 2.6  % Final    [facility] Lymph % 09/28/2023 28.6  % Final    [facility] Lymph Count 09/28/2023 2.00  1.20 - 4.80 x1000 Final    [facility] Basos % 09/28/2023 0.3  % Final    [facility] Baso Count 09/28/2023 0.02  0.00 - 0.20 x1000 Final    [facility] Mono Count 09/28/2023 0.54  0.10 - 1.00 x1000 Final    [facility] Mono % 09/28/2023 7.7  % Final    [facility] Neutrophil % 09/28/2023 60.7  % Final    [facility] Neutrophil Count (ANC) 09/28/2023 4.25  1.40 - 8.80 x1000 Final    [facility] Uric Acid 09/28/2023 4.5  3.1 - 7.8 mg/dL Final    [facility] TSH 09/28/2023 1.32  0.55 - 4.78 uIU/ml Final    [facility] GFR AA 09/28/2023 >60   Final    [facility] Bilirubin, Total 09/28/2023 0.40  0.20 - 1.00 mg/dL Final    [facility] Chloride 09/28/2023 106  98 - 107 mmol/L Final    [facility] A/G Ratio 09/28/2023 1.3   Final    [facility] BUN/Creat Ratio 09/28/2023 18.6   Final    [facility] Na 09/28/2023 143  135 - 145 mmol/L Final    [facility] GPT 09/28/2023 14  10 - 49 unit/L Final    [facility] Alk Phos 09/28/2023 59  46 - 116 unit/L Final    [facility] Creatinine 09/28/2023 0.7  0.5 - 0.8 mg/dL Final    [facility] Total Protein 09/28/2023 7.5  5.7 - 8.2 g/dL Final    [facility] CO2, venous 09/28/2023 30.0  20.0 - 31.0 mmol/L Final    [facility] Glomerular Filtration R* 09/28/2023 >60  mL/min/1.73m? Final    [facility] Calculated Osmolality 09/28/2023 285  275 - 295 mOsm/kg Final    [facility] K 09/28/2023 4.5  3.5 - 5.1 mmol/L Final    [facility] Globulin 09/28/2023 3.2  g/dL Final    [facility] BUN 09/28/2023 13  9 - 23 mg/dL Final    [facility] Calcium 09/28/2023 10.2  8.7 - 10.4 mg/dL Final    [facility] GOT 09/28/2023 18  15 - 37 unit/L Final    [facility] ALB 09/28/2023 4.3  3.4 - 5.0 g/dL Final    [facility] Glucose 09/28/2023 98   74 - 106 mg/dL Final    [facility] Calculated LDL [place]ol[place]* 09/28/2023 94  60 - 130 mg/dL Final    [facility] HDL Cholesterol 09/28/2023 77 (H)  40 - 60 mg/dL Final    [facility] Total Chol/HDL Chol Rat* 09/28/2023 2.6   Final    [facility] Triglycerides 09/28/2023 132  30 - 150 mg/dL Final    [facility] Cholesterol 09/28/2023 197  100 - 200 mg/dL Final    [facility] Microalbumin, Urine mg/L 09/28/2023 7.0  mg/L Final    [facility] Microalbumin/Creatinine* 09/28/2023 8  0 - 30 mg MALB/gm CREAT Final    [facility] Creatinine, Urine mg/dl 09/28/2023 93.6  mg/dL Final    [facility] HGB A1C 09/28/2023 5.3  % Final       Radiology: Reviewed imaging in powerchart.  No results found.    Family History   Problem Relation Name Age of Onset    Hypertension Mother      Heart disease Mother      Heart attack Father       Social History     Socioeconomic History    Marital status:      Spouse name: Not on file    Number of children: Not on file    Years of education: Not on file    Highest education level: Not on file   Occupational History    Not on file   Tobacco Use    Smoking status: Some Days     Years: 20     Types: Cigarettes    Smokeless tobacco: Never    Tobacco comments:     patch   Substance and Sexual Activity    Alcohol use: Not Currently     Alcohol/week: 0.0 - 4.0 standard drinks of alcohol    Drug use: Never    Sexual activity: Not on file   Other Topics Concern    Not on file   Social History Narrative    Not on file     Social Determinants of Health     Financial Resource Strain: Not on file   Food Insecurity: Not on file   Transportation Needs: Not on file   Physical Activity: Not on file   Stress: Not on file   Social Connections: Not on file   Intimate Partner Violence: Not on file   Housing Stability: Not on file     Past Medical History:   Diagnosis Date    Anxiety disorder, unspecified 03/19/2020    Anxiety and depression    Bipolar disorder, currently in remission, most recent episode unspecified  (CMS/[facility]) 03/19/2020    History of depressed bipolar disorder    Displaced fracture of head of unspecified radius, initial encounter for closed fracture 11/30/2021    Radial head fracture    Dorsalgia, unspecified 03/19/2020    Chronic back pain greater than 3 months duration    Encounter for screening for malignant neoplasm of colon 12/15/2020    Screen for colon cancer    Encounter for screening for malignant neoplasm of rectum 12/15/2020    Screening for rectal cancer    Essential (primary) hypertension 03/19/2020    Benign essential hypertension    Hematuria, unspecified 11/30/2021    Painless hematuria    Hypothyroidism, unspecified 03/19/2020    Acquired hypothyroidism    Nicotine dependence, cigarettes, uncomplicated 10/29/2020    Continuous dependence on cigarette smoking    Pain in right arm 03/21/2017    Arm pain, right    Pain in unspecified shoulder 03/19/2020    Chronic pain in shoulder    Personal history of other diseases of the circulatory system 03/19/2020    History of hypertension    Personal history of other diseases of the digestive system 03/19/2020    History of gastroesophageal reflux (GERD)    Personal history of other diseases of the musculoskeletal system and connective tissue 10/29/2020    History of arthritis    Personal history of other diseases of the nervous system and sense organs 11/30/2021    History of restless legs syndrome    Personal history of other diseases of the nervous system and sense organs 03/19/2020    History of restless legs syndrome    Personal history of other diseases of the nervous system and sense organs 03/19/2020    History of restless legs syndrome    Personal history of other diseases of the respiratory system 10/13/2022    History of acute bronchitis    Personal history of other diseases of the respiratory system 10/11/2022    History of acute sinusitis    Personal history of other mental and behavioral disorders 08/08/2016    History of anxiety    Personal  history of other specified conditions 11/30/2021    History of insomnia    Personal history of other venous thrombosis and embolism 03/19/2020    History of deep venous thrombosis    Personal history of pulmonary embolism 03/19/2020    History of pulmonary embolism    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

## 2024-03-21 ENCOUNTER — OFFICE VISIT (OUTPATIENT)
Dept: PRIMARY CARE | Facility: CLINIC | Age: 64
End: 2024-03-21
Payer: COMMERCIAL

## 2024-03-21 VITALS
SYSTOLIC BLOOD PRESSURE: 136 MMHG | HEIGHT: 68 IN | BODY MASS INDEX: 16.91 KG/M2 | HEART RATE: 91 BPM | TEMPERATURE: 97.3 F | DIASTOLIC BLOOD PRESSURE: 76 MMHG | OXYGEN SATURATION: 96 % | WEIGHT: 111.6 LBS

## 2024-03-21 DIAGNOSIS — G47.00 INSOMNIA, UNSPECIFIED TYPE: Primary | ICD-10-CM

## 2024-03-21 DIAGNOSIS — F17.210 CIGARETTE SMOKER: ICD-10-CM

## 2024-03-21 DIAGNOSIS — F51.01 PRIMARY INSOMNIA: ICD-10-CM

## 2024-03-21 DIAGNOSIS — I10 BENIGN ESSENTIAL HYPERTENSION: ICD-10-CM

## 2024-03-21 DIAGNOSIS — Z11.4 SCREENING FOR HIV WITHOUT PRESENCE OF RISK FACTORS: ICD-10-CM

## 2024-03-21 DIAGNOSIS — K21.00 GASTROESOPHAGEAL REFLUX DISEASE WITH ESOPHAGITIS WITHOUT HEMORRHAGE: ICD-10-CM

## 2024-03-21 DIAGNOSIS — Z11.59 NEED FOR HEPATITIS C SCREENING TEST: ICD-10-CM

## 2024-03-21 DIAGNOSIS — R05.9 COUGH, UNSPECIFIED TYPE: ICD-10-CM

## 2024-03-21 PROCEDURE — 3078F DIAST BP <80 MM HG: CPT | Performed by: INTERNAL MEDICINE

## 2024-03-21 PROCEDURE — 99214 OFFICE O/P EST MOD 30 MIN: CPT | Performed by: INTERNAL MEDICINE

## 2024-03-21 PROCEDURE — 3075F SYST BP GE 130 - 139MM HG: CPT | Performed by: INTERNAL MEDICINE

## 2024-03-21 RX ORDER — ZOLPIDEM TARTRATE 10 MG/1
10 TABLET ORAL NIGHTLY PRN
Qty: 30 TABLET | Refills: 2 | Status: SHIPPED | OUTPATIENT
Start: 2024-03-21

## 2024-03-21 NOTE — PROGRESS NOTES
"Subjective   Patient ID: Princess Nevarez \"Collin" is a 63 y.o. female who presents for Follow-up (4 month ).    Assessment/Plan     Problem List Items Addressed This Visit       Benign essential hypertension    Cigarette smoker     Stop smoking 6 months ago still intermittently smoke advised please stop totally nicotine patch as needed CAT scan of the lung for nodules CAT scan of the heart for calcium score once a year         GERD (gastroesophageal reflux disease)    Insomnia - Primary     I personally reviewed the [facility] report for this patient. This report is scanned into the electronic medical record. I have considered  the risks of abuse, dependence, addiction and diversion. After discussion, patient does have a good understanding of the safety and  risks of this medication. I believe that it is clinically appropriate for this patient to be prescribed this medication.  See patient back in 6 weeks.         Relevant Medications    zolpidem (Ambien) 10 mg tablet    Other Relevant Orders    Drug Screen, Urine With Reflex to Confirmation    HIV 1/2 Antigen/Antibody Screen with Reflex to Confirmation    Hepatitis C antibody    Microscopic Only, Urine    Urine Culture    Screening for HIV without presence of risk factors    Relevant Orders    HIV 1/2 Antigen/Antibody Screen with Reflex to Confirmation    Cough    Relevant Orders    HIV 1/2 Antigen/Antibody Screen with Reflex to Confirmation    Hepatitis C antibody    Microscopic Only, Urine    Urine Culture    XR chest 2 views    Need for hepatitis C screening test    Relevant Orders    Hepatitis C antibody       HPI 63-year-old patient have a smoker COPD anxiety depression chronic insomnia gastritis complaining the cough congestion shortness of breath onset gradual duration since 4 to 6 weeks progressed slowly aggravating factor smoking change of the weather allergy acid reflux postviral bacterial infections    Patient treated with the steroid and tetracycline " recovering very well    Patient had insomnia using the Ambien and Benadryl    Long-term side effect explained patient continues to smoke intermittently advised to stop so    Negative for nausea vomiting diarrhea constipation patient gained 2 pounds of the weight continue weight loss problem patient advised preventive care there is a DEXA scan mammogram EGD colonoscopy and lung cancer screening test    Follow-up recommend    Strongly encourage stop smoking and gained some weight with referred patient to GI and nutrition dietitian discussed about Remeron  Past Medical History:   Diagnosis Date    Anxiety disorder, unspecified 03/19/2020    Anxiety and depression    Bipolar disorder, currently in remission, most recent episode unspecified (CMS/[facility]) 03/19/2020    History of depressed bipolar disorder    Displaced fracture of head of unspecified radius, initial encounter for closed fracture 11/30/2021    Radial head fracture    Dorsalgia, unspecified 03/19/2020    Chronic back pain greater than 3 months duration    Encounter for screening for malignant neoplasm of colon 12/15/2020    Screen for colon cancer    Encounter for screening for malignant neoplasm of rectum 12/15/2020    Screening for rectal cancer    Essential (primary) hypertension 03/19/2020    Benign essential hypertension    Hematuria, unspecified 11/30/2021    Painless hematuria    Hypothyroidism, unspecified 03/19/2020    Acquired hypothyroidism    Nicotine dependence, cigarettes, uncomplicated 10/29/2020    Continuous dependence on cigarette smoking    Pain in right arm 03/21/2017    Arm pain, right    Pain in unspecified shoulder 03/19/2020    Chronic pain in shoulder    Personal history of other diseases of the circulatory system 03/19/2020    History of hypertension    Personal history of other diseases of the digestive system 03/19/2020    History of gastroesophageal reflux (GERD)    Personal history of other diseases of the musculoskeletal system  and connective tissue 10/29/2020    History of arthritis    Personal history of other diseases of the nervous system and sense organs 11/30/2021    History of restless legs syndrome    Personal history of other diseases of the nervous system and sense organs 03/19/2020    History of restless legs syndrome    Personal history of other diseases of the nervous system and sense organs 03/19/2020    History of restless legs syndrome    Personal history of other diseases of the respiratory system 10/13/2022    History of acute bronchitis    Personal history of other diseases of the respiratory system 10/11/2022    History of acute sinusitis    Personal history of other mental and behavioral disorders 08/08/2016    History of anxiety    Personal history of other specified conditions 11/30/2021    History of insomnia    Personal history of other venous thrombosis and embolism 03/19/2020    History of deep venous thrombosis    Personal history of pulmonary embolism 03/19/2020    History of pulmonary embolism    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 18 g 6    amLODIPine (Norvasc) 5 mg tablet take 1 tablet by mouth once daily 90 tablet 1    buPROPion SR (Wellbutrin SR) 150 mg 12 hr tablet take 1 tablet by mouth twice a day 60 tablet 2    fluticasone (Flonase) 50 mcg/actuation nasal spray Administer 1 spray into each nostril once  daily. Shake gently. Before first use, prime pump. After use, clean tip and replace cap. 16 g 11    pantoprazole (ProtoNix) 40 mg EC tablet TAKE 1 TABLET BY MOUTH ONCE DAILY 90 tablet 1    zolpidem (Ambien) 10 mg tablet Take 1 tablet (10 mg) by mouth as needed at bedtime for sleep. 30 tablet 2     No current facility-administered medications for this visit.     Family History   Problem Relation Name Age of Onset    Hypertension Mother      Heart disease Mother      Heart attack Father       Social History     Socioeconomic History    Marital status:      Spouse name: None    Number of children: None    Years of education: None    Highest education level: None   Occupational History    None   Tobacco Use    Smoking status: Former     Years: 20     Types: Cigarettes    Smokeless tobacco: Never    Tobacco comments:     patch   Substance and Sexual Activity    Alcohol use: Yes     Alcohol/week: 0.0 - 4.0 standard drinks of alcohol    Drug use: Never    Sexual activity: None   Other Topics Concern    None   Social History Narrative    None     Social Determinants of Health     Financial Resource Strain: Not on file   Food Insecurity: Not on file   Transportation Needs: Not on file   Physical Activity: Not on file   Stress: Not on file   Social Connections: Not on file   Intimate Partner Violence: Not on file   Housing Stability: Not on file     Immunization History   Administered Date(s) Administered    Flu vaccine (IIV4), preservative free *Check age/dose* 09/17/2017, 09/02/2018, 09/07/2019, 08/16/2020, 09/10/2021, 07/30/2022, 09/21/2023    Hepatitis A vaccine, age 19 years and greater (HAVRIX) 07/25/2018, 04/08/2019    Hepatitis B vaccine, adult (HEPLISAV) 08/16/2022, 05/12/2023    Hepatitis B vaccine, adult (RECOMBIVAX, ENGERIX) 08/16/2022    Influenza, Unspecified 12/20/2011, 09/21/2012, 09/23/2013, 09/10/2021    Influenza, seasonal, injectable 08/27/2020    Influenza, seasonal, injectable, preservative free  "10/08/2016    MMR vaccine, subcutaneous (MMR II) 08/23/2022, 07/03/2023    Moderna COVID-19 vaccine, Fall 2023, 12 yeasrs and older (50mcg/0.5mL) 09/22/2023    Moderna SARS-CoV-2 Vaccination 02/11/2021, 03/10/2021, 10/29/2021, 04/18/2022    Pfizer COVID-19 vaccine, bivalent, age 12 years and older (30 mcg/0.3 mL) 09/17/2022, 05/12/2023    Pfizer Purple Cap SARS-CoV-2 09/17/2022, 08/19/2023    Pneumococcal Conjugate PCV 7 10/31/2020    Pneumococcal conjugate vaccine, 13-valent (PREVNAR 13) 10/31/2020    Pneumococcal conjugate vaccine, 20-valent (PREVNAR 20) 07/06/2022    Pneumococcal polysaccharide vaccine, 23-valent, age 2 years and older (PNEUMOVAX 23) 12/20/2011, 09/02/2018, 07/03/2023    Poliovirus vaccine, subcutaneous (IPOL) 08/17/2022, 07/03/2023    RESPIRATORY SYNCYTIAL VIRUS (RSV), ELIGIBLE PREGNANT PTS, 0.5 ML (ABRYSVO) 08/19/2023    Td vaccine, age 7 years and older (TENIVAC) 05/12/2023    Tdap vaccine, age 7 year and older (BOOSTRIX, ADACEL) 04/14/2019    Varicella vaccine, subcutaneous (VARIVAX) 07/03/2023    Zoster vaccine, recombinant, adult (SHINGRIX) 08/16/2020, 10/31/2020       Review of Systems  Review of systems is otherwise negative unless stated above or in history of present illness.    Objective   Visit Vitals  /76 (BP Location: Left arm, Patient Position: Sitting, BP Cuff Size: Adult)   Pulse 91   Temp 36.3 °C (97.3 °F)   Ht 1.727 m (5' 8\")   Wt 50.6 kg (111 lb 9.6 oz)   SpO2 96%   BMI 16.97 kg/m²   Smoking Status Former   BSA 1.56 m²     Physical Exam  Constitutional: Cachexia of chronic disease     General: not in acute distress.   HENT:      Head: Normocephalic and atraumatic.      Nose: Nose normal.   Eyes:      Extraocular Movements: Extraocular movements intact.      Conjunctiva/sclera: Conjunctivae normal.   Cardiovascular: Heart murmur     Rate and Rhythm: Normal rate ,  No M/R/G  Pulmonary: Barrel chest decreased air entry crackles rales rhonchi moderate      Skin:     General: " Skin is warm.   Neurological:      Mental Status: He is alert and oriented to person, place, and time.   Psychiatric:         Mood and Affect: Mood normal.         Behavior: Behavior normal.   Musculoskeletal   Osteopenia osteoarthritis of spine  No visits with results within 4 Month(s) from this visit.   Latest known visit with results is:   Orders Only on 09/28/2023   Component Date Value Ref Range Status    [facility] RBC/HPF, U 09/28/2023 2  0 - 3 #/HPF Final    [facility] Blood, U 09/28/2023 Negative  Negative Final    [facility] Leukocyte Esterase, U 09/28/2023 Large (A)  Negative Final    [facility] Ketones, U 09/28/2023 Negative  Negative Final    [facility] Mucous, U 09/28/2023 Occasional   Final    [facility] Urobilinogen Qual, U 09/28/2023 <2.0 mg/dl  <2.0 mg/dl Final    [facility] Glucose Qual, U 09/28/2023 Negative  Negative Final    [facility] WBC/HPF, U 09/28/2023 11 (H)  0 - 5 #/HPF Final    [facility] Color, U 09/28/2023 Yellow   Final    [facility] pH, U 09/28/2023 6.0  4.5 - 8.0 Final    [facility] U MICRO 09/28/2023 Indicated   Final    [facility] Specific Gravity, U 09/28/2023 1.013  1.001 - 1.035 Final    [facility] Nitrite, U 09/28/2023 Negative  Negative Final    [facility] Bilirubin, U 09/28/2023 Negative  Negative Final    [facility] Protein, U 09/28/2023 Negative  Negative Final    [facility] Squamous Epithelial Corrie* 09/28/2023 1  #/HPF Final    [facility] Appearance, U 09/28/2023 Clear   Final    [facility] HCT 09/28/2023 41.5  36.0 - 46.0 % Final    [facility] Platelet 09/28/2023 261  150 - 450 x10 Final    [facility] MCHC 09/28/2023 34.0  32.0 - 37.0 g/dL Final    [facility] RBC 09/28/2023 4.12 (L)  4.20 - 5.40 x10 Final    [facility] Instr WBC 09/28/2023 7.0   Final    [facility] MCV 09/28/2023 100.8 (H)  80.0 - 100.0 fL Final    [facility] MPV 09/28/2023 9.0  7.4 - 10.4 fL Final    [facility] HGB 09/28/2023 14.1  12.0 - 16.0 g/dL Final    [facility] RDW 09/28/2023 13.2  11.5 - 14.5 %  Final    [facility] WBC 09/28/2023 7.0  4.5 - 11.0 x10 Final    [facility] MCH 09/28/2023 34.3 (H)  27.0 - 34.0 pg Final    [facility] Nucleated RBC 09/28/2023 0  /100WBC Final    [facility] DIFF? 09/28/2023 No   Final    [facility] Eos Count 09/28/2023 0.18  0.00 - 0.50 x1000 Final    [facility] Eosin % 09/28/2023 2.6  % Final    [facility] Lymph % 09/28/2023 28.6  % Final    [facility] Lymph Count 09/28/2023 2.00  1.20 - 4.80 x1000 Final    [facility] Basos % 09/28/2023 0.3  % Final    [facility] Baso Count 09/28/2023 0.02  0.00 - 0.20 x1000 Final    [facility] Mono Count 09/28/2023 0.54  0.10 - 1.00 x1000 Final    [facility] Mono % 09/28/2023 7.7  % Final    [facility] Neutrophil % 09/28/2023 60.7  % Final    [facility] Neutrophil Count (ANC) 09/28/2023 4.25  1.40 - 8.80 x1000 Final    [facility] Uric Acid 09/28/2023 4.5  3.1 - 7.8 mg/dL Final    [facility] TSH 09/28/2023 1.32  0.55 - 4.78 uIU/ml Final    [facility] GFR AA 09/28/2023 >60   Final    [facility] Bilirubin, Total 09/28/2023 0.40  0.20 - 1.00 mg/dL Final    [facility] Chloride 09/28/2023 106  98 - 107 mmol/L Final    [facility] A/G Ratio 09/28/2023 1.3   Final    [facility] BUN/Creat Ratio 09/28/2023 18.6   Final    [facility] Na 09/28/2023 143  135 - 145 mmol/L Final    [facility] GPT 09/28/2023 14  10 - 49 unit/L Final    [facility] Alk Phos 09/28/2023 59  46 - 116 unit/L Final    [facility] Creatinine 09/28/2023 0.7  0.5 - 0.8 mg/dL Final    [facility] Total Protein 09/28/2023 7.5  5.7 - 8.2 g/dL Final    [facility] CO2, venous 09/28/2023 30.0  20.0 - 31.0 mmol/L Final    [facility] Glomerular Filtration R* 09/28/2023 >60  mL/min/1.73m? Final    [facility] Calculated Osmolality 09/28/2023 285  275 - 295 mOsm/kg Final    [facility] K 09/28/2023 4.5  3.5 - 5.1 mmol/L Final    [facility] Globulin 09/28/2023 3.2  g/dL Final    [facility] BUN 09/28/2023 13  9 - 23 mg/dL Final    [facility] Calcium 09/28/2023 10.2  8.7 - 10.4 mg/dL Final    [facility]  GOT 09/28/2023 18  15 - 37 unit/L Final    [facility] ALB 09/28/2023 4.3  3.4 - 5.0 g/dL Final    [facility] Glucose 09/28/2023 98  74 - 106 mg/dL Final    [facility] Calculated LDL Choleste* 09/28/2023 94  60 - 130 mg/dL Final    [facility] HDL Cholesterol 09/28/2023 77 (H)  40 - 60 mg/dL Final    [facility] Total Chol/HDL Chol Rat* 09/28/2023 2.6   Final    [facility] Triglycerides 09/28/2023 132  30 - 150 mg/dL Final    [facility] Cholesterol 09/28/2023 197  100 - 200 mg/dL Final    [facility] Microalbumin, Urine mg/L 09/28/2023 7.0  mg/L Final    [facility] Microalbumin/Creatinine* 09/28/2023 8  0 - 30 mg MALB/gm CREAT Final    [facility] Creatinine, Urine mg/dl 09/28/2023 93.6  mg/dL Final    [facility] HGB A1C 09/28/2023 5.3  % Final       Radiology: Reviewed imaging in powerchart.  No results found.      Charting was completed using voice recognition technology and may include unintended errors.

## 2024-03-21 NOTE — ASSESSMENT & PLAN NOTE
Stop smoking 6 months ago still intermittently smoke advised please stop totally nicotine patch as needed CAT scan of the lung for nodules CAT scan of the heart for calcium score once a year

## 2024-03-26 DIAGNOSIS — H66.90 EAR INFECTION: ICD-10-CM

## 2024-03-26 NOTE — TELEPHONE ENCOUNTER
Patient was here last week Thursday and was suppose to get antibiotics and ear drops but wasn't called in.         [facility] [place]

## 2024-03-28 RX ORDER — CIPROFLOXACIN 500 MG/1
500 TABLET ORAL 2 TIMES DAILY
Qty: 14 TABLET | Refills: 0 | Status: SHIPPED | OUTPATIENT
Start: 2024-03-28

## 2024-06-02 DIAGNOSIS — K21.9 GASTROESOPHAGEAL REFLUX DISEASE WITHOUT ESOPHAGITIS: ICD-10-CM

## 2024-06-02 DIAGNOSIS — I10 BENIGN ESSENTIAL HYPERTENSION: ICD-10-CM

## 2024-06-03 RX ORDER — PANTOPRAZOLE SODIUM 40 MG/1
40 TABLET, DELAYED RELEASE ORAL DAILY
Qty: 90 TABLET | Refills: 1 | Status: SHIPPED | OUTPATIENT
Start: 2024-06-03

## 2024-06-03 RX ORDER — AMLODIPINE BESYLATE 5 MG/1
5 TABLET ORAL DAILY
Qty: 90 TABLET | Refills: 1 | Status: SHIPPED | OUTPATIENT
Start: 2024-06-03

## 2024-06-24 ENCOUNTER — APPOINTMENT (OUTPATIENT)
Dept: LAB | Facility: LAB | Age: 64
End: 2024-06-24
Payer: COMMERCIAL

## 2024-06-24 ENCOUNTER — APPOINTMENT (OUTPATIENT)
Dept: PRIMARY CARE | Facility: CLINIC | Age: 64
End: 2024-06-24
Payer: COMMERCIAL

## 2024-06-24 VITALS
TEMPERATURE: 97.3 F | OXYGEN SATURATION: 95 % | HEART RATE: 97 BPM | HEIGHT: 68 IN | DIASTOLIC BLOOD PRESSURE: 81 MMHG | WEIGHT: 113.54 LBS | SYSTOLIC BLOOD PRESSURE: 126 MMHG | BODY MASS INDEX: 17.21 KG/M2

## 2024-06-24 DIAGNOSIS — F51.01 PRIMARY INSOMNIA: ICD-10-CM

## 2024-06-24 DIAGNOSIS — F17.210 CIGARETTE SMOKER: ICD-10-CM

## 2024-06-24 DIAGNOSIS — H61.21 IMPACTED CERUMEN OF RIGHT EAR: ICD-10-CM

## 2024-06-24 DIAGNOSIS — R05.9 COUGH, UNSPECIFIED TYPE: Primary | ICD-10-CM

## 2024-06-24 DIAGNOSIS — I10 BENIGN ESSENTIAL HYPERTENSION: ICD-10-CM

## 2024-06-24 DIAGNOSIS — K21.00 GASTROESOPHAGEAL REFLUX DISEASE WITH ESOPHAGITIS WITHOUT HEMORRHAGE: ICD-10-CM

## 2024-06-24 PROBLEM — Z11.59 NEED FOR HEPATITIS C SCREENING TEST: Status: RESOLVED | Noted: 2024-03-21 | Resolved: 2024-06-24

## 2024-06-24 PROBLEM — Z11.4 SCREENING FOR HIV WITHOUT PRESENCE OF RISK FACTORS: Status: RESOLVED | Noted: 2023-09-28 | Resolved: 2024-06-24

## 2024-06-24 PROCEDURE — 99214 OFFICE O/P EST MOD 30 MIN: CPT | Performed by: INTERNAL MEDICINE

## 2024-06-24 PROCEDURE — 3079F DIAST BP 80-89 MM HG: CPT | Performed by: INTERNAL MEDICINE

## 2024-06-24 PROCEDURE — 69210 REMOVE IMPACTED EAR WAX UNI: CPT | Performed by: INTERNAL MEDICINE

## 2024-06-24 PROCEDURE — 3074F SYST BP LT 130 MM HG: CPT | Performed by: INTERNAL MEDICINE

## 2024-06-24 RX ORDER — AZITHROMYCIN 250 MG/1
TABLET, FILM COATED ORAL DAILY
Qty: 6 TABLET | Refills: 0 | Status: SHIPPED | OUTPATIENT
Start: 2024-06-24 | End: 2024-06-29

## 2024-06-24 RX ORDER — METHYLPREDNISOLONE 4 MG/1
TABLET ORAL
Qty: 21 TABLET | Refills: 0 | Status: SHIPPED | OUTPATIENT
Start: 2024-06-24 | End: 2024-07-01

## 2024-06-24 NOTE — PROGRESS NOTES
"Subjective   Patient ID: Princess Nevarez \"Collin" is a 63 y.o. female who presents for Follow-up (3 month ), Ear Fullness, and Shortness of Breath (Past month or so-can you rx a inhaler? ).    Assessment/Plan     Problem List Items Addressed This Visit       Benign essential hypertension     Patients BP readings reviewed and addressed, as we age our arteries turn stiffer and less elastic. Restricting salt consumption and staying physically fit with regular exercise regimen is the only way to keep our vasculature less tonic. Studies have shown that keeping ideal body wt, exercise routine about 140 to 150 minutes a week, eating variety of plant based diet and drinking plentiful water are quite helpful. Monitor BP twice or once a week at home and bring log to be reviewed by me. Uncontrolled BP has long term consequences including heart failure, myocardial infarction, accelerated atherosclerosis and kidney dysfunction. Therapy reviewed and explained.           Relevant Orders    Albumin , Urine Random    CBC and Auto Differential    Comprehensive Metabolic Panel    Hemoglobin A1C    Lipid Panel    Uric Acid    TSH with reflex to Free T4 if abnormal    Microscopic Only, Urine    Cigarette smoker     I spent 10 minutes counseling patient about need for smoking/tobacco cessation and support discuss the nicotine  replacement therapy ,varenicline,bupropion,  hypnosis, support group, acupuncture as a potential options  .  I discussed smoking history/status that determined patient with criteria for lung cancer screening with a low-dose CT scan. By using shared decision  making we  determinded the patient will benefit from the screening, including discussion of benefit and harms of screening, follow-up diagnostic testing, overt diagnosis, false positive rate, and total radiation exposure. I counseled the patient on the importance of adhering to a annul lung cancer low-dose CT scan screening, the impact off comorbidities, and " inability or unwillingness to undergo's diagnosis and treatment if abnormality discovered. I provided patient an order for low-dose CT lung cancer screening    I spent 8 minutes counseling the patient on the importance of abstaining from the tobacco/cigarette smoking and  provided information about tobacco cessation intervention I provided patient an order for tobacco suggestion therapy             GERD (gastroesophageal reflux disease)    Relevant Orders    Albumin , Urine Random    CBC and Auto Differential    Comprehensive Metabolic Panel    Hemoglobin A1C    Lipid Panel    Uric Acid    TSH with reflex to Free T4 if abnormal    Microscopic Only, Urine    Insomnia     I personally reviewed the [facility] report for this patient. This report is scanned into the electronic medical record. I have considered  the risks of abuse, dependence, addiction and diversion. After discussion, patient does have a good understanding of the safety and  risks of this medication. I believe that it is clinically appropriate for this patient to be prescribed this medication.  See patient back in 6 weeks.         Relevant Orders    Albumin , Urine Random    CBC and Auto Differential    Comprehensive Metabolic Panel    Hemoglobin A1C    Lipid Panel    Uric Acid    TSH with reflex to Free T4 if abnormal    Microscopic Only, Urine    Cough - Primary     Stop smoking given Zithromax Medrol Dosepak Cortisporin eardrops chest x-ray         Relevant Orders    Albumin , Urine Random    CBC and Auto Differential    Comprehensive Metabolic Panel    Hemoglobin A1C    Lipid Panel    Uric Acid    TSH with reflex to Free T4 if abnormal    Microscopic Only, Urine    XR chest 2 views    Impacted cerumen of right ear     Ear Fullness, and Shortness of Breath (Past month or so-can you rx a inhaler? ).    HPI this is a 63-year-old patient continue to smoke and drink complaining of the ear pain sore throat cough congestion wheezing onset acutely duration 2  weeks progressed acutely aggravating factor smoking allergy acid reflux    Review scan review blood test discussed with the patient strongly encouraged to cut down alcohol and stop smoking    Continue have intermittent pain in the right ear advised to get ENT evaluation Dr. Ja Jasmines.  Given patient probiotic antibiotic eardrops and Medrol Dosepak    Hypertension amlodipine    Gastritis Protonix    Chronic insomnia melatonin Ambien advised to get urine tox screen    Sent for the blood test vaccination updated cancer preventive disorder follow-up recommend thanks  Past Medical History:   Diagnosis Date    Anxiety disorder, unspecified 03/19/2020    Anxiety and depression    Bipolar disorder, currently in remission, most recent episode unspecified (Multi) 03/19/2020    History of depressed bipolar disorder    Displaced fracture of head of unspecified radius, initial encounter for closed fracture 11/30/2021    Radial head fracture    Dorsalgia, unspecified 03/19/2020    Chronic back pain greater than 3 months duration    Encounter for screening for malignant neoplasm of colon 12/15/2020    Screen for colon cancer    Encounter for screening for malignant neoplasm of rectum 12/15/2020    Screening for rectal cancer    Essential (primary) hypertension 03/19/2020    Benign essential hypertension    Hematuria, unspecified 11/30/2021    Painless hematuria    Hypothyroidism, unspecified 03/19/2020    Acquired hypothyroidism    Nicotine dependence, cigarettes, uncomplicated 10/29/2020    Continuous dependence on cigarette smoking    Pain in right arm 03/21/2017    Arm pain, right    Pain in unspecified shoulder 03/19/2020    Chronic pain in shoulder    Personal history of other diseases of the circulatory system 03/19/2020    History of hypertension    Personal history of other diseases of the digestive system 03/19/2020    History of gastroesophageal reflux (GERD)    Personal history of other diseases of the  musculoskeletal system and connective tissue 10/29/2020    History of arthritis    Personal history of other diseases of the nervous system and sense organs 11/30/2021    History of restless legs syndrome    Personal history of other diseases of the nervous system and sense organs 03/19/2020    History of restless legs syndrome    Personal history of other diseases of the nervous system and sense organs 03/19/2020    History of restless legs syndrome    Personal history of other diseases of the respiratory system 10/13/2022    History of acute bronchitis    Personal history of other diseases of the respiratory system 10/11/2022    History of acute sinusitis    Personal history of other mental and behavioral disorders 08/08/2016    History of anxiety    Personal history of other specified conditions 11/30/2021    History of insomnia    Personal history of other venous thrombosis and embolism 03/19/2020    History of deep venous thrombosis    Personal history of pulmonary embolism 03/19/2020    History of pulmonary embolism    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 18 g 6    amLODIPine (Norvasc) 5 mg tablet take 1 tablet by mouth once daily 90 tablet 1    fluticasone (Flonase) 50 mcg/actuation nasal spray Administer 1 spray into each nostril once daily. Shake gently. Before first use, prime pump. After use, clean tip and  replace cap. 16 g 11    pantoprazole (ProtoNix) 40 mg EC tablet TAKE 1 TABLET BY MOUTH ONCE DAILY 90 tablet 1    zolpidem (Ambien) 10 mg tablet Take 1 tablet (10 mg) by mouth as needed at bedtime for sleep. 30 tablet 2     No current facility-administered medications for this visit.     Family History   Problem Relation Name Age of Onset    Hypertension Mother      Heart disease Mother      Heart attack Father       Social History     Socioeconomic History    Marital status:      Spouse name: None    Number of children: None    Years of education: None    Highest education level: None   Occupational History    None   Tobacco Use    Smoking status: Former     Types: Cigarettes    Smokeless tobacco: Never    Tobacco comments:     patch   Substance and Sexual Activity    Alcohol use: Yes     Alcohol/week: 0.0 - 4.0 standard drinks of alcohol    Drug use: Never    Sexual activity: None   Other Topics Concern    None   Social History Narrative    None     Social Determinants of Health     Financial Resource Strain: Not on file   Food Insecurity: Not on file   Transportation Needs: Not on file   Physical Activity: Not on file   Stress: Not on file   Social Connections: Not on file   Intimate Partner Violence: Not on file   Housing Stability: Not on file     Immunization History   Administered Date(s) Administered    Flu vaccine (IIV4), preservative free *Check age/dose* 09/17/2017, 09/02/2018, 09/07/2019, 08/16/2020, 09/10/2021, 07/30/2022, 09/21/2023    Hepatitis A vaccine, age 19 years and greater (HAVRIX) 07/25/2018, 04/08/2019    Hepatitis B vaccine, 18yrs and older(HEPLISAV) 08/16/2022, 05/12/2023    Hepatitis B vaccine, adult *Check Product/Dose* 08/16/2022    Influenza, Unspecified 12/20/2011, 09/21/2012, 09/23/2013, 09/10/2021    Influenza, seasonal, injectable 08/27/2020    Influenza, seasonal, injectable, preservative free 10/08/2016    MMR vaccine, subcutaneous (MMR II) 08/23/2022, 07/03/2023    Moderna  "COVID-19 vaccine, Fall 2023, 12 yeasrs and older (50mcg/0.5mL) 09/22/2023    Moderna SARS-CoV-2 Vaccination 02/11/2021, 03/10/2021, 10/29/2021, 04/18/2022    Pfizer COVID-19 vaccine, bivalent, age 12 years and older (30 mcg/0.3 mL) 09/17/2022, 05/12/2023    Pfizer Purple Cap SARS-CoV-2 09/17/2022, 08/19/2023    Pneumococcal Conjugate PCV 7 10/31/2020    Pneumococcal conjugate vaccine, 13-valent (PREVNAR 13) 10/31/2020    Pneumococcal conjugate vaccine, 20-valent (PREVNAR 20) 07/06/2022    Pneumococcal polysaccharide vaccine, 23-valent, age 2 years and older (PNEUMOVAX 23) 12/20/2011, 09/02/2018, 07/03/2023    Poliovirus vaccine, subcutaneous (IPOL) 08/17/2022, 07/03/2023    RESPIRATORY SYNCYTIAL VIRUS (RSV), ELIGIBLE PREGNANT PTS, 0.5 ML (ABRYSVO) 08/19/2023    Td vaccine, age 7 years and older (TENIVAC) 05/12/2023    Tdap vaccine, age 7 year and older (BOOSTRIX, ADACEL) 04/14/2019    Varicella vaccine, subcutaneous (VARIVAX) 07/03/2023    Zoster vaccine, recombinant, adult (SHINGRIX) 08/16/2020, 10/31/2020       Review of Systems  Review of systems is otherwise negative unless stated above or in history of present illness.    Objective   Visit Vitals  /81   Pulse 97   Temp 36.3 °C (97.3 °F)   Ht 1.727 m (5' 8\")   Wt 51.5 kg (113 lb 8.6 oz)   SpO2 95%   BMI 17.26 kg/m²   Smoking Status Former   BSA 1.57 m²     Physical Exam  Constitutional: BMI 17     General: not in acute d allergy conjunctivitis istress.   HENT: Eustachian tube block     Head: Normocephalic and atraumatic.      Nose: Nose normal.   Eyes: Allergy conjunctivitis     Extraocular Movements: Extraocular movements intact.      Conjunctiva/sclera: Conjunctivae normal.   Cardiovascular: Heart murmur     Rate and Rhythm: Normal rate ,  No M/R/G  Pulmonary: Crackles with rhonchi     Effort: Pulmonary effort is normal.      Breath sounds: Normal, Bilat Equal AE  Skin: Dry skin clubbing of nail     General: Skin is warm.   Neurological:      Mental " Status: He is alert and oriented to person, place, and time.   Psychiatric:    PTSD     Mood and Affect: Mood normal.         Behavior: Behavior normal.   Musculoskeletal   FROM in all extremitirs,  Joint-no swelling or tenderness    No visits with results within 4 Month(s) from this visit.   Latest known visit with results is:   Orders Only on 09/28/2023   Component Date Value Ref Range Status    [facility] RBC/HPF, U 09/28/2023 2  0 - 3 #/HPF Final    [facility] Blood, U 09/28/2023 Negative  Negative Final    [facility] Leukocyte Esterase, U 09/28/2023 Large (A)  Negative Final    [facility] Ketones, U 09/28/2023 Negative  Negative Final    [facility] Mucous, U 09/28/2023 Occasional   Final    [facility] Urobilinogen Qual, U 09/28/2023 <2.0 mg/dl  <2.0 mg/dl Final    [facility] Glucose Qual, U 09/28/2023 Negative  Negative Final    [facility] WBC/HPF, U 09/28/2023 11 (H)  0 - 5 #/HPF Final    [facility] Color, U 09/28/2023 Yellow   Final    [facility] pH, U 09/28/2023 6.0  4.5 - 8.0 Final    [facility] U MICRO 09/28/2023 Indicated   Final    [facility] Specific Gravity, U 09/28/2023 1.013  1.001 - 1.035 Final    [facility] Nitrite, U 09/28/2023 Negative  Negative Final    [facility] Bilirubin, U 09/28/2023 Negative  Negative Final    [facility] Protein, U 09/28/2023 Negative  Negative Final    [facility] Squamous Epithelial Corrie* 09/28/2023 1  #/HPF Final    [facility] Appearance, U 09/28/2023 Clear   Final    [facility] HCT 09/28/2023 41.5  36.0 - 46.0 % Final    [facility] Platelet 09/28/2023 261  150 - 450 x10 Final    [facility] MCHC 09/28/2023 34.0  32.0 - 37.0 g/dL Final    [facility] RBC 09/28/2023 4.12 (L)  4.20 - 5.40 x10 Final    [facility] Instr WBC 09/28/2023 7.0   Final    [facility] MCV 09/28/2023 100.8 (H)  80.0 - 100.0 fL Final    [facility] MPV 09/28/2023 9.0  7.4 - 10.4 fL Final    [facility] HGB 09/28/2023 14.1  12.0 - 16.0 g/dL Final    [facility] RDW 09/28/2023 13.2  11.5 - 14.5 % Final     [facility] WBC 09/28/2023 7.0  4.5 - 11.0 x10 Final    [facility] MCH 09/28/2023 34.3 (H)  27.0 - 34.0 pg Final    [facility] Nucleated RBC 09/28/2023 0  /100WBC Final    [facility] DIFF? 09/28/2023 No   Final    [facility] Eos Count 09/28/2023 0.18  0.00 - 0.50 x1000 Final    [facility] Eosin % 09/28/2023 2.6  % Final    [facility] Lymph % 09/28/2023 28.6  % Final    [facility] Lymph Count 09/28/2023 2.00  1.20 - 4.80 x1000 Final    [facility] Basos % 09/28/2023 0.3  % Final    [facility] Baso Count 09/28/2023 0.02  0.00 - 0.20 x1000 Final    [facility] Mono Count 09/28/2023 0.54  0.10 - 1.00 x1000 Final    [facility] Mono % 09/28/2023 7.7  % Final    [facility] Neutrophil % 09/28/2023 60.7  % Final    [facility] Neutrophil Count (ANC) 09/28/2023 4.25  1.40 - 8.80 x1000 Final    [facility] Uric Acid 09/28/2023 4.5  3.1 - 7.8 mg/dL Final    [facility] TSH 09/28/2023 1.32  0.55 - 4.78 uIU/ml Final    [facility] GFR AA 09/28/2023 >60   Final    [facility] Bilirubin, Total 09/28/2023 0.40  0.20 - 1.00 mg/dL Final    [facility] Chloride 09/28/2023 106  98 - 107 mmol/L Final    [facility] A/G Ratio 09/28/2023 1.3   Final    [facility] BUN/Creat Ratio 09/28/2023 18.6   Final    [facility] Na 09/28/2023 143  135 - 145 mmol/L Final    [facility] GPT 09/28/2023 14  10 - 49 unit/L Final    [facility] Alk Phos 09/28/2023 59  46 - 116 unit/L Final    [facility] Creatinine 09/28/2023 0.7  0.5 - 0.8 mg/dL Final    [facility] Total Protein 09/28/2023 7.5  5.7 - 8.2 g/dL Final    [facility] CO2, venous 09/28/2023 30.0  20.0 - 31.0 mmol/L Final    [facility] Glomerular Filtration R* 09/28/2023 >60  mL/min/1.73m? Final    [facility] Calculated Osmolality 09/28/2023 285  275 - 295 mOsm/kg Final    [facility] K 09/28/2023 4.5  3.5 - 5.1 mmol/L Final    [facility] Globulin 09/28/2023 3.2  g/dL Final    [facility] BUN 09/28/2023 13  9 - 23 mg/dL Final    [facility] Calcium 09/28/2023 10.2  8.7 - 10.4 mg/dL Final    [facility] GOT  09/28/2023 18  15 - 37 unit/L Final    [facility] ALB 09/28/2023 4.3  3.4 - 5.0 g/dL Final    [facility] Glucose 09/28/2023 98  74 - 106 mg/dL Final    [facility] Calculated LDL Choleste* 09/28/2023 94  60 - 130 mg/dL Final    [facility] HDL Cholesterol 09/28/2023 77 (H)  40 - 60 mg/dL Final    [facility] Total Chol/HDL Chol Rat* 09/28/2023 2.6   Final    [facility] Triglycerides 09/28/2023 132  30 - 150 mg/dL Final    [facility] Cholesterol 09/28/2023 197  100 - 200 mg/dL Final    [facility] Microalbumin, Urine mg/L 09/28/2023 7.0  mg/L Final    [facility] Microalbumin/Creatinine* 09/28/2023 8  0 - 30 mg MALB/gm CREAT Final    [facility] Creatinine, Urine mg/dl 09/28/2023 93.6  mg/dL Final    [facility] HGB A1C 09/28/2023 5.3  % Final       Radiology: Reviewed imaging in powerchart.  No results found.      Charting was completed using voice recognition technology and may include unintended errors.

## 2024-06-25 NOTE — ASSESSMENT & PLAN NOTE
With warm water irrigation hydrogen peroxide curette wax was removed from the right ear no damage no bleeding was used water hydrogen peroxide and curette

## 2024-07-24 ENCOUNTER — TELEPHONE (OUTPATIENT)
Dept: PRIMARY CARE | Facility: CLINIC | Age: 64
End: 2024-07-24
Payer: COMMERCIAL

## 2024-07-24 DIAGNOSIS — R05.9 COUGH, UNSPECIFIED TYPE: ICD-10-CM

## 2024-07-24 NOTE — TELEPHONE ENCOUNTER
PT was last in office in June and  provided her a sample inhaler, Breztri.   It has helped her a lot and she would like to have a prescription.

## 2024-07-25 RX ORDER — BUDESONIDE, GLYCOPYRROLATE, AND FORMOTEROL FUMARATE 160; 9; 4.8 UG/1; UG/1; UG/1
2 AEROSOL, METERED RESPIRATORY (INHALATION)
Qty: 10.7 G | Refills: 3 | Status: SHIPPED | OUTPATIENT
Start: 2024-07-25

## 2024-08-21 LAB
NON-UH HIE A/G RATIO: 1.1
NON-UH HIE ALB: 4.1 G/DL (ref 3.4–5)
NON-UH HIE ALK PHOS: 61 UNIT/L (ref 45–117)
NON-UH HIE APPEARANCE, U: CLEAR
NON-UH HIE BASO COUNT: 0.06 X1000 (ref 0–0.2)
NON-UH HIE BASOS %: 0.6 %
NON-UH HIE BILIRUBIN, TOTAL: 0.6 MG/DL (ref 0.3–1.2)
NON-UH HIE BILIRUBIN, U: NEGATIVE
NON-UH HIE BLOOD, U: NEGATIVE
NON-UH HIE BUN/CREAT RATIO: 11.2
NON-UH HIE BUN: 9 MG/DL (ref 9–23)
NON-UH HIE CALCIUM: 9.6 MG/DL (ref 8.7–10.4)
NON-UH HIE CALCULATED LDL CHOLESTEROL: 89 MG/DL (ref 60–130)
NON-UH HIE CALCULATED OSMOLALITY: 280 MOSM/KG (ref 275–295)
NON-UH HIE CHLORIDE: 107 MMOL/L (ref 98–107)
NON-UH HIE CHOLESTEROL: 198 MG/DL (ref 100–200)
NON-UH HIE CO2, VENOUS: 28 MMOL/L (ref 20–31)
NON-UH HIE COLOR, U: YELLOW
NON-UH HIE CREATININE, URINE MG/DL: 151.8 MG/DL
NON-UH HIE CREATININE: 0.8 MG/DL (ref 0.5–0.8)
NON-UH HIE DIFF?: NO
NON-UH HIE EOS COUNT: 0.31 X1000 (ref 0–0.5)
NON-UH HIE EOSIN %: 3.7 %
NON-UH HIE GFR AA: >60
NON-UH HIE GLOBULIN: 3.8 G/DL
NON-UH HIE GLOMERULAR FILTRATION RATE: >60 ML/MIN/1.73M?
NON-UH HIE GLUCOSE QUAL, U: NEGATIVE
NON-UH HIE GLUCOSE: 92 MG/DL (ref 74–106)
NON-UH HIE GOT: 16 UNIT/L (ref 15–37)
NON-UH HIE GPT: 11 UNIT/L (ref 10–49)
NON-UH HIE HCT: 45.7 % (ref 36–46)
NON-UH HIE HDL CHOLESTEROL: 90 MG/DL (ref 40–60)
NON-UH HIE HGB A1C: 5.2 %
NON-UH HIE HGB: 15.1 G/DL (ref 12–16)
NON-UH HIE INSTR WBC: 8.5
NON-UH HIE K: 4.5 MMOL/L (ref 3.5–5.1)
NON-UH HIE KETONES, U: NEGATIVE
NON-UH HIE LEUKOCYTE ESTERASE, U: ABNORMAL
NON-UH HIE LYMPH %: 29.6 %
NON-UH HIE LYMPH COUNT: 2.52 X1000 (ref 1.2–4.8)
NON-UH HIE MCH: 33.4 PG (ref 27–34)
NON-UH HIE MCHC: 33 G/DL (ref 32–37)
NON-UH HIE MCV: 101.2 FL (ref 80–100)
NON-UH HIE MICROALBUMIN, URINE MG/L: 7 MG/L
NON-UH HIE MICROALBUMIN/CREATININE RATIO: 5 MG MALB/GM CREAT (ref 0–30)
NON-UH HIE MONO %: 8.4 %
NON-UH HIE MONO COUNT: 0.72 X1000 (ref 0.1–1)
NON-UH HIE MPV: 9.4 FL (ref 7.4–10.4)
NON-UH HIE MUCOUS, U: ABNORMAL
NON-UH HIE NA: 141 MMOL/L (ref 135–145)
NON-UH HIE NEUTROPHIL %: 57.7 %
NON-UH HIE NEUTROPHIL COUNT (ANC): 4.93 X1000 (ref 1.4–8.8)
NON-UH HIE NITRITE, U: NEGATIVE
NON-UH HIE NUCLEATED RBC: 0 /100WBC
NON-UH HIE PH, U: 5.5 (ref 4.5–8)
NON-UH HIE PLATELET: 287 X10 (ref 150–450)
NON-UH HIE PROTEIN, U: NEGATIVE
NON-UH HIE RBC/HPF, U: 2 #/HPF (ref 0–3)
NON-UH HIE RBC: 4.51 X10 (ref 4.2–5.4)
NON-UH HIE RDW: 13.9 % (ref 11.5–14.5)
NON-UH HIE SPECIFIC GRAVITY, U: 1.02 (ref 1–1.03)
NON-UH HIE SQUAMOUS EPITHELIAL CELLS, U: <1 #/HPF
NON-UH HIE TOTAL CHOL/HDL CHOL RATIO: 2.2
NON-UH HIE TOTAL PROTEIN: 7.9 G/DL (ref 5.7–8.2)
NON-UH HIE TRIGLYCERIDES: 97 MG/DL (ref 30–150)
NON-UH HIE TSH: 1.21 UIU/ML (ref 0.55–4.78)
NON-UH HIE U MICRO: ABNORMAL
NON-UH HIE URIC ACID: 4.6 MG/DL (ref 3.1–7.8)
NON-UH HIE UROBILINOGEN QUAL, U: ABNORMAL
NON-UH HIE WBC/HPF, U: 7 #/HPF (ref 0–5)
NON-UH HIE WBC: 8.5 X10 (ref 4.5–11)

## 2024-08-22 ENCOUNTER — TELEPHONE (OUTPATIENT)
Dept: PRIMARY CARE | Facility: CLINIC | Age: 64
End: 2024-08-22
Payer: COMMERCIAL

## 2024-08-22 DIAGNOSIS — N39.0 URINARY TRACT INFECTION WITHOUT HEMATURIA, SITE UNSPECIFIED: ICD-10-CM

## 2024-08-22 RX ORDER — CIPROFLOXACIN 250 MG/1
250 TABLET, FILM COATED ORAL 2 TIMES DAILY
Qty: 14 TABLET | Refills: 0 | Status: SHIPPED | OUTPATIENT
Start: 2024-08-22 | End: 2024-08-29

## 2024-08-22 NOTE — TELEPHONE ENCOUNTER
----- Message from Frances Valentin sent at 8/22/2024 10:18 AM EDT -----  LDL cholesterol 89 MCV we little high UTI no diabetes your thyroid kidney liver normal advise Cipro 250 twice a day for 1 week repeat UA CNS 3 weeks

## 2024-08-24 LAB
NON-UH HIE 6-ACETYLMORPHINE (CUTOFF 20 NG/ML): NOT DETECTED
NON-UH HIE 7-AMINOCLONAZEPAM (CUTOFF 40 NG/ML): NOT DETECTED
NON-UH HIE ALPHA-OH-ALPRAZOLAM (CUTOFF 20 NG/ML): NOT DETECTED
NON-UH HIE ALPHA-OH-MIDAZOLAM (CUTOFF 20 NG/ML): NOT DETECTED
NON-UH HIE ALPRAZOLAM (CUTOFF 40 NG/ML): NOT DETECTED
NON-UH HIE AMPHETAMINE (CUTOFF 50 NG/ML): NOT DETECTED
NON-UH HIE BARBITURATES (CUTOFF 200 NG/ML): NEGATIVE
NON-UH HIE BENZOYLECGONINE (CUTOFF 150 NG/ML): NEGATIVE
NON-UH HIE BUPRENORPHINE (CUTOFF 5 NG/ML): NOT DETECTED
NON-UH HIE CARISOPRODOL (CUT-OFF 100 NG/ML): NEGATIVE
NON-UH HIE CLONAZEPAM (CUTOFF 20 NG/ML): NOT DETECTED
NON-UH HIE CODEINE (CUTOFF 40 NG/ML): NOT DETECTED
NON-UH HIE CREATININE, URINE: 165.9 MG/DL (ref 20–400)
NON-UH HIE DIAZEPAM (CUTOFF 50 NG/ML): NOT DETECTED
NON-UH HIE EER PAIN MGT PAN, MASS SPEC/EMIT INTERP: NORMAL
NON-UH HIE ETHYL GLUCURONIDE (CUTOFF 500 NG/ML): NORMAL
NON-UH HIE FENTANYL (CUTOFF 2 NG/ML): NOT DETECTED
NON-UH HIE GABAPENTIN (CUTOFF 100 NG/ML): NOT DETECTED
NON-UH HIE HYDROCODONE (CUTOFF 40 NG/ML): NOT DETECTED
NON-UH HIE HYDROMORPHONE (CUTOFF 20 NG/ML): NOT DETECTED
NON-UH HIE LORAZEPAM (CUTOFF 60 NG/ML): NOT DETECTED
NON-UH HIE MARIJUANA METABOLITE (CUTOFF 20 NG/ML): NEGATIVE
NON-UH HIE MDA (CUTOFF 200 NG/ML): NOT DETECTED
NON-UH HIE MDEA- EVE (CUTOFF 200 NG/ML): NOT DETECTED
NON-UH HIE MDMA- ECSTASY (CUTOFF 200 NG/ML): NOT DETECTED
NON-UH HIE MEPERIDINE METABOLITE (CUTOFF 50 NG/ML): NOT DETECTED
NON-UH HIE METHADONE (CUTOFF 150 NG/ML): NEGATIVE
NON-UH HIE METHAMPHETAMINE (CUTOFF 200 NG/ML): NOT DETECTED
NON-UH HIE METHYLPHENIDATE (CUTOFF 100 NG/ML): NOT DETECTED
NON-UH HIE MIDAZOLAM (CUTOFF 20 NG/ML): NOT DETECTED
NON-UH HIE MORPHINE (CUTOFF 20 NG/ML): NOT DETECTED
NON-UH HIE NALOXONE (CUTOFF 100 NG/ML): NOT DETECTED
NON-UH HIE NORBUPRENORPHINE (CUTOFF 20 NG/ML): NOT DETECTED
NON-UH HIE NORDIAZEPAM (CUTOFF 50 NG/ML): NOT DETECTED
NON-UH HIE NORFENTANYL (CUTOFF 2 NG/ML): NOT DETECTED
NON-UH HIE NORHYDROCODONE  (CUTOFF 100 NG/ML): NOT DETECTED
NON-UH HIE NOROXYCODONE  (CUTOFF 100 NG/ML): NOT DETECTED
NON-UH HIE NOROXYMORPHONE  (CUTOFF 100 NG/ML): NOT DETECTED
NON-UH HIE OXAZEPAM (CUTOFF 50 NG/ML): NOT DETECTED
NON-UH HIE OXYCODONE (CUTOFF 40 NG/ML): NOT DETECTED
NON-UH HIE OXYMORPHONE (CUTOFF 40 NG/ML): NOT DETECTED
NON-UH HIE PAIN MANAGEMENT DRUG PANEL INTERP: NORMAL
NON-UH HIE PAIN MANAGEMENT DRUG PANEL: NORMAL
NON-UH HIE PCP (CUTOFF 25 NG/ML): NEGATIVE
NON-UH HIE PHENTERMINE (CUTOFF 100 NG/ML): NOT DETECTED
NON-UH HIE PREGABALIN (CUTOFF 100 NG/ML): NOT DETECTED
NON-UH HIE TAPENTADOL (CUTOFF 100 NG/ML): NOT DETECTED
NON-UH HIE TAPENTADOL-O-SULF (CUTOFF 200 NG/ML): NOT DETECTED
NON-UH HIE TEMAZEPAM (CUTOFF 50 NG/ML): NOT DETECTED
NON-UH HIE TRAMADOL (CUTOFF 200 NG/ML): NEGATIVE
NON-UH HIE ZOLPIDEM (CUTOFF 20 NG/ML): NOT DETECTED
NON-UH HIE ZOLPIDEM METABOLITE (CUTOFF 100 NG/ML): PRESENT

## 2024-08-26 ENCOUNTER — TELEPHONE (OUTPATIENT)
Dept: PRIMARY CARE | Facility: CLINIC | Age: 64
End: 2024-08-26
Payer: COMMERCIAL

## 2024-08-26 DIAGNOSIS — N39.0 URINARY TRACT INFECTION WITHOUT HEMATURIA, SITE UNSPECIFIED: ICD-10-CM

## 2024-08-26 RX ORDER — CIPROFLOXACIN 250 MG/1
250 TABLET, FILM COATED ORAL 2 TIMES DAILY
Qty: 14 TABLET | Refills: 0 | Status: SHIPPED | OUTPATIENT
Start: 2024-08-26 | End: 2024-09-02

## 2024-08-26 NOTE — TELEPHONE ENCOUNTER
Pt calls stating cipro was sent to [facility] in [place], but that pharmacy no longer exists, and they need it sent to [facility] in [place]

## 2024-09-23 DIAGNOSIS — I10 BENIGN ESSENTIAL HYPERTENSION: ICD-10-CM

## 2024-09-23 DIAGNOSIS — Z00.00 HEALTH CARE MAINTENANCE: ICD-10-CM

## 2024-09-23 DIAGNOSIS — F17.210 CIGARETTE SMOKER: ICD-10-CM

## 2024-09-23 RX ORDER — AMLODIPINE BESYLATE 5 MG/1
5 TABLET ORAL DAILY
Qty: 90 TABLET | Refills: 3 | Status: SHIPPED | OUTPATIENT
Start: 2024-09-23

## 2024-09-23 RX ORDER — CLOTRIMAZOLE AND BETAMETHASONE DIPROPIONATE 10; .64 MG/G; MG/G
1 CREAM TOPICAL 2 TIMES DAILY
COMMUNITY
Start: 2023-12-17 | End: 2024-09-23 | Stop reason: SDUPTHER

## 2024-09-23 RX ORDER — CLOTRIMAZOLE AND BETAMETHASONE DIPROPIONATE 10; .64 MG/G; MG/G
1 CREAM TOPICAL 2 TIMES DAILY
Qty: 45 G | Refills: 1 | Status: SHIPPED | OUTPATIENT
Start: 2024-09-23

## 2024-09-23 RX ORDER — ALBUTEROL SULFATE 90 UG/1
2 INHALANT RESPIRATORY (INHALATION) EVERY 4 HOURS PRN
Qty: 18 G | Refills: 6 | Status: SHIPPED | OUTPATIENT
Start: 2024-09-23

## 2024-09-24 ENCOUNTER — APPOINTMENT (OUTPATIENT)
Dept: PRIMARY CARE | Facility: CLINIC | Age: 64
End: 2024-09-24
Payer: COMMERCIAL

## 2024-09-24 VITALS
WEIGHT: 104.8 LBS | HEIGHT: 68 IN | OXYGEN SATURATION: 92 % | DIASTOLIC BLOOD PRESSURE: 86 MMHG | BODY MASS INDEX: 15.88 KG/M2 | HEART RATE: 100 BPM | SYSTOLIC BLOOD PRESSURE: 128 MMHG | TEMPERATURE: 97.3 F

## 2024-09-24 DIAGNOSIS — Z12.2 ENCOUNTER FOR SCREENING FOR LUNG CANCER: ICD-10-CM

## 2024-09-24 DIAGNOSIS — Z00.01 ANNUAL VISIT FOR GENERAL ADULT MEDICAL EXAMINATION WITH ABNORMAL FINDINGS: Primary | ICD-10-CM

## 2024-09-24 DIAGNOSIS — I73.9 PAD (PERIPHERAL ARTERY DISEASE) (CMS-HCC): ICD-10-CM

## 2024-09-24 DIAGNOSIS — J44.9 COPD MIXED TYPE (MULTI): ICD-10-CM

## 2024-09-24 DIAGNOSIS — G47.00 INSOMNIA, UNSPECIFIED TYPE: ICD-10-CM

## 2024-09-24 DIAGNOSIS — Z12.11 SCREEN FOR COLON CANCER: ICD-10-CM

## 2024-09-24 DIAGNOSIS — F17.210 CIGARETTE SMOKER: ICD-10-CM

## 2024-09-24 DIAGNOSIS — Z13.820 SCREENING FOR OSTEOPOROSIS: ICD-10-CM

## 2024-09-24 DIAGNOSIS — I10 BENIGN ESSENTIAL HYPERTENSION: ICD-10-CM

## 2024-09-24 DIAGNOSIS — I10 HYPERTENSION, UNSPECIFIED TYPE: ICD-10-CM

## 2024-09-24 DIAGNOSIS — K21.00 GASTROESOPHAGEAL REFLUX DISEASE WITH ESOPHAGITIS WITHOUT HEMORRHAGE: ICD-10-CM

## 2024-09-24 DIAGNOSIS — Z12.31 ENCOUNTER FOR SCREENING MAMMOGRAM FOR MALIGNANT NEOPLASM OF BREAST: ICD-10-CM

## 2024-09-24 PROBLEM — R05.9 COUGH: Status: RESOLVED | Noted: 2024-03-21 | Resolved: 2024-09-24

## 2024-09-24 PROBLEM — H61.21 IMPACTED CERUMEN OF RIGHT EAR: Status: RESOLVED | Noted: 2024-06-24 | Resolved: 2024-09-24

## 2024-09-24 PROCEDURE — 3074F SYST BP LT 130 MM HG: CPT | Performed by: INTERNAL MEDICINE

## 2024-09-24 PROCEDURE — 3079F DIAST BP 80-89 MM HG: CPT | Performed by: INTERNAL MEDICINE

## 2024-09-24 PROCEDURE — G0296 VISIT TO DETERM LDCT ELIG: HCPCS | Performed by: INTERNAL MEDICINE

## 2024-09-24 PROCEDURE — 99396 PREV VISIT EST AGE 40-64: CPT | Performed by: INTERNAL MEDICINE

## 2024-09-24 PROCEDURE — 99213 OFFICE O/P EST LOW 20 MIN: CPT | Performed by: INTERNAL MEDICINE

## 2024-09-24 PROCEDURE — 3008F BODY MASS INDEX DOCD: CPT | Performed by: INTERNAL MEDICINE

## 2024-09-24 RX ORDER — ZOLPIDEM TARTRATE 10 MG/1
10 TABLET ORAL NIGHTLY PRN
Qty: 30 TABLET | Refills: 2 | Status: SHIPPED | OUTPATIENT
Start: 2024-09-24

## 2024-09-24 ASSESSMENT — PATIENT HEALTH QUESTIONNAIRE - PHQ9
SUM OF ALL RESPONSES TO PHQ9 QUESTIONS 1 AND 2: 1
10. IF YOU CHECKED OFF ANY PROBLEMS, HOW DIFFICULT HAVE THESE PROBLEMS MADE IT FOR YOU TO DO YOUR WORK, TAKE CARE OF THINGS AT HOME, OR GET ALONG WITH OTHER PEOPLE: SOMEWHAT DIFFICULT
1. LITTLE INTEREST OR PLEASURE IN DOING THINGS: NOT AT ALL
2. FEELING DOWN, DEPRESSED OR HOPELESS: SEVERAL DAYS

## 2024-09-24 NOTE — ASSESSMENT & PLAN NOTE
Refer to dentist ophthal[facility] and pulmonary nodule clinic's and his GI sent for the flu pneumonia COVID-19 vaccines advised to get a PVR study calcium score for the heart and CAT scan of the lung and colonoscopy all prescription was given to patient

## 2024-09-24 NOTE — PROGRESS NOTES
"Subjective   Patient ID: Princess Nevarez \"Collin" is a 63 y.o. female who presents for Annual Exam.    Assessment/Plan     Problem List Items Addressed This Visit       Benign essential hypertension     Patients BP readings reviewed and addressed, as we age our arteries turn stiffer and less elastic. Restricting salt consumption and staying physically fit with regular exercise regimen is the only way to keep our vasculature less tonic. Studies have shown that keeping ideal body wt, exercise routine about 140 to 150 minutes a week, eating variety of plant based diet and drinking plentiful water are quite helpful. Monitor BP twice or once a week at home and bring log to be reviewed by me. Uncontrolled BP has long term consequences including heart failure, myocardial infarction, accelerated atherosclerosis and kidney dysfunction. Therapy reviewed and explained.           Cigarette smoker     I discussed smoking history/status that determined patient with criteria for lung cancer screening with a low-dose CT scan. By using shared decision  making we  determinded the patient will benefit from the screening, including discussion of benefit and harms of screening, follow-up diagnostic testing, overt diagnosis, false positive rate, and total radiation exposure. I counseled the patient on the importance of adhering to a annul lung cancer low-dose CT scan screening, the impact off comorbidities, and inability or unwillingness to undergo's diagnosis and treatment if abnormality discovered. I provided patient an order for low-dose CT lung cancer screening    I spent 8 minutes counseling the patient on the importance of abstaining from the tobacco/cigarette smoking and  provided information about tobacco cessation intervention I provided patient an order for tobacco suggestion therapy             Relevant Orders    Vascular US PVR with exercise    GERD (gastroesophageal reflux disease)     Monitor magnesium H. pylori CBC once " a year refer to GI for EGD colonoscopy Dr. Tolentino         Insomnia     I personally reviewed the OARRS report for this patient. This report is scanned into the electronic medical record. I have considered  the risks of abuse, dependence, addiction and diversion. After discussion, patient does have a good understanding of the safety and  risks of this medication. I believe that it is clinically appropriate for this patient to be prescribed this medication.  See patient back in 6 weeks.         Relevant Medications    zolpidem (Ambien) 10 mg tablet    Annual visit for general adult medical examination with abnormal findings - Primary     Refer to dentist ophthalmology dietitians and pulmonary nodule clinic's and his GI sent for the flu pneumonia COVID-19 vaccines advised to get a PVR study calcium score for the heart and CAT scan of the lung and colonoscopy all prescription was given to patient         COPD mixed type (Multi)     PFT once a year          Other Visit Diagnoses       Hypertension, unspecified type        Relevant Orders    CT cardiac scoring wo IV contrast    Encounter for screening for lung cancer        Relevant Orders    CT lung screening low dose    Screening for osteoporosis        Relevant Orders    XR DEXA bone density    Encounter for screening mammogram for malignant neoplasm of breast        Relevant Orders    BI mammo bilateral screening tomosynthesis    Screen for colon cancer        Relevant Orders    Colonoscopy Screening; Average Risk Patient    PAD (peripheral artery disease) (CMS-HCC)        Relevant Orders    Vascular US PVR with exercise            HPI this is a 63-year-old patient  with 3 children    No brother 3 sister 1  from the car accidents    Mother had dementia past    Father of heart attack positive    Personal history of gallbladder surgery cataract surgery macular hole repair of the right eye    Also history of COPD hypertension hyperlipidemia gastritis  osteoporosis osteoarthritis anxiety insomnia gastritis allergic rhinitis    Complaining of the claudications with the arthralgia myalgia fatigue tired leg cramps    Negative for Apetex hemoptysis anxiety depressive dementia or suicide    Negative for RSV flu or COVID    No hematuria no hemoptysis no cyanosis  Past Medical History:   Diagnosis Date    Anxiety disorder, unspecified 03/19/2020    Anxiety and depression    Bipolar disorder, currently in remission, most recent episode unspecified (Multi) 03/19/2020    History of depressed bipolar disorder    Displaced fracture of head of unspecified radius, initial encounter for closed fracture 11/30/2021    Radial head fracture    Dorsalgia, unspecified 03/19/2020    Chronic back pain greater than 3 months duration    Encounter for screening for malignant neoplasm of colon 12/15/2020    Screen for colon cancer    Encounter for screening for malignant neoplasm of rectum 12/15/2020    Screening for rectal cancer    Essential (primary) hypertension 03/19/2020    Benign essential hypertension    Hematuria, unspecified 11/30/2021    Painless hematuria    Hypothyroidism, unspecified 03/19/2020    Acquired hypothyroidism    Nicotine dependence, cigarettes, uncomplicated 10/29/2020    Continuous dependence on cigarette smoking    Pain in right arm 03/21/2017    Arm pain, right    Pain in unspecified shoulder 03/19/2020    Chronic pain in shoulder    Personal history of other diseases of the circulatory system 03/19/2020    History of hypertension    Personal history of other diseases of the digestive system 03/19/2020    History of gastroesophageal reflux (GERD)    Personal history of other diseases of the musculoskeletal system and connective tissue 10/29/2020    History of arthritis    Personal history of other diseases of the nervous system and sense organs 11/30/2021    History of restless legs syndrome    Personal history of other diseases of the nervous system and sense  organs 03/19/2020    History of restless legs syndrome    Personal history of other diseases of the nervous system and sense organs 03/19/2020    History of restless legs syndrome    Personal history of other diseases of the respiratory system 10/13/2022    History of acute bronchitis    Personal history of other diseases of the respiratory system 10/11/2022    History of acute sinusitis    Personal history of other mental and behavioral disorders 08/08/2016    History of anxiety    Personal history of other specified conditions 11/30/2021    History of insomnia    Personal history of other venous thrombosis and embolism 03/19/2020    History of deep venous thrombosis    Personal history of pulmonary embolism 03/19/2020    History of pulmonary embolism    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 18 g 6    amLODIPine (Norvasc) 5 mg tablet Take 1 tablet (5 mg) by mouth once daily. 90 tablet 3    budesonide-glycopyr-formoterol (Breztri Aerosphere) 160-9-4.8 mcg/actuation HFA aerosol inhaler Inhale 2 puffs 2 times a day. 10.7 g 3    clotrimazole-betamethasone (Lotrisone) cream Apply 1 Application topically 2 times a day. 45 g 1    fluticasone (Flonase) 50 mcg/actuation nasal spray Administer 1 spray into each nostril once daily. Shake gently. Before first use, prime pump. After use, clean tip and replace cap. 16 g 11     pantoprazole (ProtoNix) 40 mg EC tablet TAKE 1 TABLET BY MOUTH ONCE DAILY 90 tablet 1    zolpidem (Ambien) 10 mg tablet Take 1 tablet (10 mg) by mouth as needed at bedtime for sleep. 30 tablet 2     No current facility-administered medications for this visit.     Family History   Problem Relation Name Age of Onset    Hypertension Mother      Heart disease Mother      Heart attack Father       Social History     Socioeconomic History    Marital status:    Tobacco Use    Smoking status: Some Days     Types: Cigarettes    Smokeless tobacco: Never    Tobacco comments:     patch   Substance and Sexual Activity    Alcohol use: Yes     Alcohol/week: 0.0 - 4.0 standard drinks of alcohol    Drug use: Never     Immunization History   Administered Date(s) Administered    Flu vaccine (IIV4), preservative free *Check age/dose* 09/17/2017, 09/02/2018, 09/07/2019, 08/16/2020, 09/10/2021, 07/30/2022, 09/21/2023    Flu vaccine, trivalent, preservative free, age 6 months and greater (Fluarix/Fluzone/Flulaval) 10/08/2016    Flu vaccine, trivalent, preservative free, no egg protein, age 6 months or greater (Flucelvax) 09/18/2024    Hepatitis A vaccine, age 19 years and greater (HAVRIX) 07/25/2018, 04/08/2019    Hepatitis B vaccine, 18yrs and older(HEPLISAV) 08/16/2022, 05/12/2023    Hepatitis B vaccine, adult *Check Product/Dose* 08/16/2022    Influenza, Unspecified 12/20/2011, 09/21/2012, 09/23/2013, 09/10/2021    Influenza, seasonal, injectable 08/27/2020    MMR vaccine, subcutaneous (MMR II) 08/23/2022, 07/03/2023    Moderna COVID-19 vaccine, 12 years and older (50mcg/0.5mL)(Spikevax) 09/22/2023, 09/18/2024    Moderna SARS-CoV-2 Vaccination 02/11/2021, 03/10/2021, 10/29/2021, 04/18/2022    Pfizer COVID-19 vaccine, bivalent, age 12 years and older (30 mcg/0.3 mL) 09/17/2022, 05/12/2023    Pfizer Purple Cap SARS-CoV-2 09/17/2022, 08/19/2023    Pneumococcal Conjugate PCV 7 10/31/2020    Pneumococcal conjugate vaccine, 13-valent  "(PREVNAR 13) 10/31/2020    Pneumococcal conjugate vaccine, 20-valent (PREVNAR 20) 07/06/2022    Pneumococcal polysaccharide vaccine, 23-valent, age 2 years and older (PNEUMOVAX 23) 12/20/2011, 09/02/2018, 07/03/2023    Poliovirus vaccine, subcutaneous (IPOL) 08/17/2022, 07/03/2023    RESPIRATORY SYNCYTIAL VIRUS (RSV), ELIGIBLE PREGNANT PTS, 0.5 ML (ABRYSVO) 08/19/2023    Td vaccine, age 7 years and older (TENIVAC) 05/12/2023    Tdap vaccine, age 7 year and older (BOOSTRIX, ADACEL) 04/14/2019    Varicella vaccine, subcutaneous (VARIVAX) 07/03/2023    Zoster vaccine, recombinant, adult (SHINGRIX) 08/16/2020, 10/31/2020       Review of Systems  Review of systems is otherwise negative unless stated above or in history of present illness.    Objective   Visit Vitals  /86   Pulse 100   Temp 36.3 °C (97.3 °F)   Ht 1.727 m (5' 8\")   Wt 47.5 kg (104 lb 12.8 oz)   SpO2 92%   BMI 15.93 kg/m²   Smoking Status Some Days   BSA 1.51 m²     Physical Exam  Constitutional: Cachexia of chronic disease     General: not in acute distress.   HENT:      Head: Normocephalic and atraumatic.      Nose: Nose normal.   Eyes:      Extraocular Movements: Extraocular movements intact.      Conjunctiva/sclera: Conjunctivae normal.   Cardiovascular: Heart murmur     Rate and Rhythm: Normal rate ,  No M/R/G  Pulmonary: Barrel chest crackles rales rhonchi     Effort: Pulmonary effort is normal.      Breath sounds: Normal, Bilat Equal AE  Skin: Clubbing of nail     General: Skin is warm.   Neurological:      Mental Status: He is alert and oriented to person, place, and time.   Psychiatric:    Anxiety depression     Mood and Affect: Mood normal.         Behavior: Behavior normal.   Musculoskeletal osteoporosis osteoarthritis  FROM in all extremitirs,  Joint-no swelling or tenderness  Vascular pedal pulses since her dorsalis and posterior tibial    Orders Only on 08/24/2024   Component Date Value Ref Range Status    [facility] Alpha-OH-Alprazolam " (cu* 08/24/2024 Not Detected   Final    [facility] MDMA- Ecstasy (cutoff 2* 08/24/2024 Not Detected   Final    [facility] Meperidine metabolite (* 08/24/2024 Not Detected   Final    [facility] Norhydrocodone  (cutoff* 08/24/2024 Not Detected   Final    [facility] Morphine (cutoff 20 ng/* 08/24/2024 Not Detected   Final    [facility] Carisoprodol (cut-off 1* 08/24/2024 Negative   Final    [facility] Zolpidem (cutoff 20 ng/* 08/24/2024 Not Detected   Final    [facility] Pregabalin (cutoff 100 * 08/24/2024 Not Detected   Final    [facility] Benzoylecgonine (cutoff* 08/24/2024 Negative   Final    [facility] Amphetamine (cutoff 50 * 08/24/2024 Not Detected   Final    [facility] Fentanyl (cutoff 2 ng/m* 08/24/2024 Not Detected   Final    [facility] Noroxymorphone  (cutoff* 08/24/2024 Not Detected   Final    [facility]* 08/24/2024 See Note   Final    [facility] Marijuana Metabolite (c* 08/24/2024 Negative   Final    [facility] Midazolam (cutoff 20 ng* 08/24/2024 Not Detected   Final    [facility] Diazepam (cutoff 50 ng/* 08/24/2024 Not Detected   Final    [facility] Methylphenidate (cutoff* 08/24/2024 Not Detected   Final    [facility] Methadone (cutoff 150 n* 08/24/2024 Negative   Final    [facility] Naloxone (cutoff 100 ng* 08/24/2024 Not Detected   Final    [facility] Noroxycodone  (cutoff 1* 08/24/2024 Not Detected   Final    [facility] Creatinine, Urine 08/24/2024 165.9  20.0 - 400.0 mg/dL Final    [facility] Temazepam (cutoff 50 ng* 08/24/2024 Not Detected   Final    [facility] Clonazepam (cutoff 20 n* 08/24/2024 Not Detected   Final    [facility] MDA (cutoff 200 ng/mL) 08/24/2024 Not Detected   Final    [facility] Tapentadol (cutoff 100 * 08/24/2024 Not Detected   Final    [facility] Hydromorphone (cutoff 2* 08/24/2024 Not Detected   Final    [facility] 6-acetylmorphine (cutof* 08/24/2024 Not Detected   Final    [facility]* 08/24/2024 See Below   Final    [facility]  Zolpidem Metabolite (cu* 08/24/2024 Present   Final    [facility] Nordiazepam (cutoff 50 * 08/24/2024 Not Detected   Final    [facility] Alprazolam (cutoff 40 n* 08/24/2024 Not Detected   Final    [facility] Methamphetamine (cutoff* 08/24/2024 Not Detected   Final    [facility] Norfentanyl (cutoff 2 n* 08/24/2024 Not Detected   Final    [facility] Hydrocodone (cutoff 40 * 08/24/2024 Not Detected   Final    [facility] PCP (cutoff 25 ng/mL) 08/24/2024 Negative   Final    [facility] Codeine (cutoff 40 ng/m* 08/24/2024 Not Detected   Final    [facility] Alpha-OH-Midazolam (cut* 08/24/2024 Not Detected   Final    [facility] Gabapentin (cutoff 100 * 08/24/2024 Not Detected   Final    [facility] Phentermine (cutoff 100* 08/24/2024 Not Detected   Final    [facility] Tramadol (cutoff 200 ng* 08/24/2024 Negative   Final    [facility] Norbuprenorphine (cutof* 08/24/2024 Not Detected   Final    [facility] Oxymorphone (cutoff 40 * 08/24/2024 Not Detected   Final    [facility] Ethyl Glucuronide (cuto* 08/24/2024 PresumptivePOS   Final    [facility] Lorazepam (cutoff 60 ng* 08/24/2024 Not Detected   Final    [facility] 7-Aminoclonazepam (cuto* 08/24/2024 Not Detected   Final    [facility] MDEA- Keyla (cutoff 200 n* 08/24/2024 Not Detected   Final    [facility] Tapentadol-o-Sulf (cuto* 08/24/2024 Not Detected   Final    [facility] Buprenorphine (cutoff 5* 08/24/2024 Not Detected   Final    [facility] Oxycodone (cutoff 40 ng* 08/24/2024 Not Detected   Final    [facility] EER Pain Mgt Av[place], [place] * 08/24/2024 See Note   Final    [facility] Barbiturates (cutoff 20* 08/24/2024 Negative   Final    [facility] Oxazepam (cutoff 50 ng/* 08/24/2024 Not Detected   Final   Orders Only on 08/21/2024   Component Date Value Ref Range Status    [facility] Mucous, U 08/21/2024 Occasional   Final    [facility] Protein, U 08/21/2024 Negative  Negative Final    [facility] Appearance, U 08/21/2024 Clear  Clear Final    [facility] WBC/HPF, U 08/21/2024 7  (H)  0 - 5 #/HPF Final    [facility] Blood, U 08/21/2024 Negative  Negative Final    [facility] Leukocyte Esterase, U 08/21/2024 75 [place]/ul (A)  Negative Final    [facility] Ketones, U 08/21/2024 Negative  Negative Final    [facility] Glucose Qual, U 08/21/2024 Negative  Negative Final    [facility] Urobilinogen Qual, U 08/21/2024 < 2 mg/dl  < 2 mg/dl Final    [facility] Squamous Epithelial Corrie* 08/21/2024 <1  #/HPF Final    [facility] Color, U 08/21/2024 Yellow  Yellow Final    [facility] pH, U 08/21/2024 5.5  4.5 - 8.0 Final    [facility] RBC/HPF, U 08/21/2024 2  0 - 3 #/HPF Final    [facility] Specific Gravity, U 08/21/2024 1.019  1.001 - 1.035 Final    [facility] Bilirubin, U 08/21/2024 Negative  Negative Final    [facility] Nitrite, U 08/21/2024 Negative  Negative Final    [facility] U MICRO 08/21/2024 Indicated   Final    [facility] WBC 08/21/2024 8.5  4.5 - 11.0 x10 Final    [facility] RDW 08/21/2024 13.9  11.5 - 14.5 % Final    [facility] MCH 08/21/2024 33.4  27.0 - 34.0 pg Final    [facility] HCT 08/21/2024 45.7  36.0 - 46.0 % Final    [facility] Nucleated RBC 08/21/2024 0  /100WBC Final    [facility] Platelet 08/21/2024 287  150 - 450 x10 Final    [facility] RBC 08/21/2024 4.51  4.20 - 5.40 x10 Final    [facility] Instr WBC 08/21/2024 8.5   Final    [facility] MCHC 08/21/2024 33.0  32.0 - 37.0 g/dL Final    [facility] MCV 08/21/2024 101.2 (H)  80.0 - 100.0 fL Final    [facility] MPV 08/21/2024 9.4  7.4 - 10.4 fL Final    [facility] HGB 08/21/2024 15.1  12.0 - 16.0 g/dL Final    [facility] DIFF? 08/21/2024 No   Final    [facility] Neutrophil Count (ANC) 08/21/2024 4.93  1.40 - 8.80 x1000 Final    [facility] Eosin % 08/21/2024 3.7  % Final    [facility] Eos Count 08/21/2024 0.31  0.00 - 0.50 x1000 Final    [facility] Lymph Count 08/21/2024 2.52  1.20 - 4.80 x1000 Final    [facility] Lymph % 08/21/2024 29.6  % Final    [facility] Baso Count 08/21/2024 0.06  0.00 - 0.20 x1000 Final    [facility] Basos %  08/21/2024 0.6  % Final    [facility] Mono Count 08/21/2024 0.72  0.10 - 1.00 x1000 Final    [facility] Mono % 08/21/2024 8.4  % Final    [facility] Neutrophil % 08/21/2024 57.7  % Final    [facility] Uric Acid 08/21/2024 4.6  3.1 - 7.8 mg/dL Final    [facility] GOT 08/21/2024 16  15 - 37 unit/L Final    [facility] ALB 08/21/2024 4.1  3.4 - 5.0 g/dL Final    [facility] Glucose 08/21/2024 92  74 - 106 mg/dL Final    [facility] GFR AA 08/21/2024 >60   Final    [facility] Bilirubin, Total 08/21/2024 0.60  0.30 - 1.20 mg/dL Final    [facility] Chloride 08/21/2024 107  98 - 107 mmol/L Final    [facility] A/G Ratio 08/21/2024 1.1   Final    [facility] Na 08/21/2024 141  135 - 145 mmol/L Final    [facility] BUN/Creat Ratio 08/21/2024 11.2   Final    [facility] GPT 08/21/2024 11  10 - 49 unit/L Final    [facility] Alk Phos 08/21/2024 61  45 - 117 unit/L Final    [facility] Creatinine 08/21/2024 0.8  0.5 - 0.8 mg/dL Final    [facility] Total Protein 08/21/2024 7.9  5.7 - 8.2 g/dL Final    [facility] CO2, venous 08/21/2024 28.0  20.0 - 31.0 mmol/L Final    [facility] Glomerular Filtration R* 08/21/2024 >60  mL/min/1.73m? Final    [facility] Calculated Osmolality 08/21/2024 280  275 - 295 mOsm/kg Final    [facility] K 08/21/2024 4.5  3.5 - 5.1 mmol/L Final    [facility] Globulin 08/21/2024 3.8  g/dL Final    [facility] BUN 08/21/2024 9  9 - 23 mg/dL Final    [facility] Calcium 08/21/2024 9.6  8.7 - 10.4 mg/dL Final    [facility] Cholesterol 08/21/2024 198  100 - 200 mg/dL Final    [facility] Calculated LDL [place]ol[place]* 08/21/2024 89  60 - 130 mg/dL Final    [facility] HDL Cholesterol 08/21/2024 90 (H)  40 - 60 mg/dL Final    [facility] Total Chol/HDL Chol Rat* 08/21/2024 2.2   Final    [facility] Triglycerides 08/21/2024 97  30 - 150 mg/dL Final    [facility] TSH 08/21/2024 1.21  0.55 - 4.78 uIU/ml Final    [facility] Microalbumin/Creatinine* 08/21/2024 5  0 - 30 mg MALB/gm CREAT Final    [facility] Creatinine, Urine mg/dl  08/21/2024 151.8  mg/dL Final    [facility] Microalbumin, Urine mg/L 08/21/2024 7.0  mg/L Final    [facility] HGB A1C 08/21/2024 5.2  % Final       Radiology: Reviewed imaging in powerchart.  No results found.      Charting was completed using voice recognition technology and may include unintended errors.

## 2024-09-24 NOTE — ASSESSMENT & PLAN NOTE
I discussed smoking history/status that determined patient with criteria for lung cancer screening with a low-dose CT scan. By using shared decision  making we  determinded the patient will benefit from the screening, including discussion of benefit and harms of screening, follow-up diagnostic testing, overt diagnosis, false positive rate, and total radiation exposure. I counseled the patient on the importance of adhering to a annul lung cancer low-dose CT scan screening, the impact off comorbidities, and inability or unwillingness to undergo's diagnosis and treatment if abnormality discovered. I provided patient an order for low-dose CT lung cancer screening    I spent 8 minutes counseling the patient on the importance of abstaining from the tobacco/cigarette smoking and  provided information about tobacco cessation intervention I provided patient an order for tobacco suggestion therapy

## 2025-01-22 ENCOUNTER — DOCUMENTATION (OUTPATIENT)
Dept: PRIMARY CARE | Facility: CLINIC | Age: 65
End: 2025-01-22
Payer: MEDICARE

## 2025-01-22 NOTE — PROGRESS NOTES
Discharge Facility:[facility] [place]  Discharge Diagnosis:Acute hypoxic respiratory failure; COPD with acute exacerbation  Admission Date:1.17.25  Discharge Date: 1.21.25    PCP Appointment Date:1.23.25  Specialist Appointment Date:   Hospital Encounter and Summary Linked: NO  appt within 2 business days

## 2025-01-23 ENCOUNTER — APPOINTMENT (OUTPATIENT)
Dept: PRIMARY CARE | Facility: CLINIC | Age: 65
End: 2025-01-23
Payer: COMMERCIAL

## 2025-01-23 VITALS
BODY MASS INDEX: 16.55 KG/M2 | OXYGEN SATURATION: 95 % | HEIGHT: 68 IN | SYSTOLIC BLOOD PRESSURE: 124 MMHG | DIASTOLIC BLOOD PRESSURE: 83 MMHG | TEMPERATURE: 97.3 F | HEART RATE: 78 BPM | WEIGHT: 109.2 LBS

## 2025-01-23 DIAGNOSIS — F17.210 CIGARETTE SMOKER: Primary | ICD-10-CM

## 2025-01-23 DIAGNOSIS — J44.1 COPD EXACERBATION (MULTI): ICD-10-CM

## 2025-01-23 DIAGNOSIS — K21.00 GASTROESOPHAGEAL REFLUX DISEASE WITH ESOPHAGITIS WITHOUT HEMORRHAGE: ICD-10-CM

## 2025-01-23 DIAGNOSIS — Z09 HOSPITAL DISCHARGE FOLLOW-UP: ICD-10-CM

## 2025-01-23 DIAGNOSIS — F41.1 GAD (GENERALIZED ANXIETY DISORDER): ICD-10-CM

## 2025-01-23 DIAGNOSIS — G47.00 INSOMNIA, UNSPECIFIED TYPE: ICD-10-CM

## 2025-01-23 DIAGNOSIS — I10 BENIGN ESSENTIAL HYPERTENSION: ICD-10-CM

## 2025-01-23 PROBLEM — Z00.01 ANNUAL VISIT FOR GENERAL ADULT MEDICAL EXAMINATION WITH ABNORMAL FINDINGS: Status: RESOLVED | Noted: 2023-09-28 | Resolved: 2025-01-23

## 2025-01-23 PROCEDURE — 1160F RVW MEDS BY RX/DR IN RCRD: CPT | Performed by: INTERNAL MEDICINE

## 2025-01-23 PROCEDURE — 3079F DIAST BP 80-89 MM HG: CPT | Performed by: INTERNAL MEDICINE

## 2025-01-23 PROCEDURE — 1123F ACP DISCUSS/DSCN MKR DOCD: CPT | Performed by: INTERNAL MEDICINE

## 2025-01-23 PROCEDURE — 3008F BODY MASS INDEX DOCD: CPT | Performed by: INTERNAL MEDICINE

## 2025-01-23 PROCEDURE — 1159F MED LIST DOCD IN RCRD: CPT | Performed by: INTERNAL MEDICINE

## 2025-01-23 PROCEDURE — 99495 TRANSJ CARE MGMT MOD F2F 14D: CPT | Performed by: INTERNAL MEDICINE

## 2025-01-23 PROCEDURE — 3074F SYST BP LT 130 MM HG: CPT | Performed by: INTERNAL MEDICINE

## 2025-01-23 RX ORDER — PAROXETINE 10 MG/1
10 TABLET, FILM COATED ORAL NIGHTLY
Qty: 90 TABLET | Refills: 1 | Status: SHIPPED | OUTPATIENT
Start: 2025-01-23

## 2025-01-23 RX ORDER — ZOLPIDEM TARTRATE 10 MG/1
10 TABLET ORAL NIGHTLY PRN
Qty: 30 TABLET | Refills: 2 | Status: SHIPPED | OUTPATIENT
Start: 2025-01-23

## 2025-01-23 RX ORDER — GUAIFENESIN 600 MG/1
1 TABLET, EXTENDED RELEASE ORAL
COMMUNITY
Start: 2025-01-21

## 2025-01-23 RX ORDER — METHYLPREDNISOLONE 4 MG/1
TABLET ORAL
COMMUNITY
Start: 2025-01-21

## 2025-01-23 RX ORDER — AZITHROMYCIN 500 MG/1
TABLET, FILM COATED ORAL
COMMUNITY
Start: 2025-01-21

## 2025-01-23 NOTE — ASSESSMENT & PLAN NOTE
Continue Paxil 10 mg Ambien as needed I personally reviewed the OARRS report for this patient. This report is scanned into the electronic medical record. I have considered  the risks of abuse, dependence, addiction and diversion. After discussion, patient does have a good understanding of the safety and  risks of this medication. I believe that it is clinically appropriate for this patient to be prescribed this medication.  See patient back in 6 weeks.  PHQ 6

## 2025-01-23 NOTE — ADDENDUM NOTE
Addended by: VERONICA BROWNING on: 1/23/2025 01:29 PM     Modules accepted: Orders     Another 18mg  Adenosine IVP administered by Uvaldo Parsons Clinical Supervisor RN with Chucho Tomlin RN at bedside. Pt connected to life pack and continuous EKG. Dr. Jose Campbell at bedside. [facility] aware.

## 2025-01-23 NOTE — PROGRESS NOTES
"Subjective   Patient ID: Princess Nevarez \"Collin" is a 64 y.o. female who presents for Hospital Follow-up (Discuss Paxil).    Assessment/Plan     Problem List Items Addressed This Visit       Benign essential hypertension     Aspirin 81 mg amlodipine 5 mg a day BMP twice a year         RESOLVED: Cigarette smoker - Primary    GERD (gastroesophageal reflux disease)     Continue Protonix 40 mg a day monitor magnesium H. pylori once a year         Insomnia     Continue Ambien plus as needed melatonin         Relevant Medications    zolpidem (Ambien) 10 mg tablet    COPD exacerbation (Multi)     Lung nodule 1.4 cm size right lung seen by myself and pulmonary going to follow-up with Dr. Miller regarding PET scan CAT scan and bronchoscopy advised not to smoke         CON (generalized anxiety disorder)     Continue Paxil 10 mg Ambien as needed I personally reviewed the OARRS report for this patient. This report is scanned into the electronic medical record. I have considered  the risks of abuse, dependence, addiction and diversion. After discussion, patient does have a good understanding of the safety and  risks of this medication. I believe that it is clinically appropriate for this patient to be prescribed this medication.  See patient back in 6 weeks.  PHQ 6         Hospital discharge follow-up     Face to face visit with patient discuss discharge medication and outpatient medication ceconciliations and answers all questions to patient's and caregiver review hospital record pending diagnostic test and treatment orders to improved coordinate care across the medical community and  referal with provider/service to support patient's health and health related problems to increase patient's satisfaction by reducing risk of readmission I improving And meeting patient's needs advise bring all prescription and nonprescription medication with you.              HPI this is a 64-year-old patient who had a history of hypertension " hyperlipidemia gastritis COPD endocrine hospital admitted evaluated by myself infectious disease treated with IV fluids steroid antibiotics erosive treatment    During the investigation found out have 1.4 cm nodule right lung    Negative for hemoptysis hematuria rectal bleeding    Moderate anxiety with insomnia    Negative for fall    Negative for suicide    Negative for hypoxia or hemoptysis    Patient had home oxygen oxygen saturation running between 93 to 95% on 2 L as needed    Case discussed with pulmonary service Dr. Miller  Past Medical History:   Diagnosis Date    Anxiety disorder, unspecified 03/19/2020    Anxiety and depression    Bipolar disorder, currently in remission, most recent episode unspecified (Multi) 03/19/2020    History of depressed bipolar disorder    Displaced fracture of head of unspecified radius, initial encounter for closed fracture 11/30/2021    Radial head fracture    Dorsalgia, unspecified 03/19/2020    Chronic back pain greater than 3 months duration    Encounter for screening for malignant neoplasm of colon 12/15/2020    Screen for colon cancer    Encounter for screening for malignant neoplasm of rectum 12/15/2020    Screening for rectal cancer    Essential (primary) hypertension 03/19/2020    Benign essential hypertension    Hematuria, unspecified 11/30/2021    Painless hematuria    Hypothyroidism, unspecified 03/19/2020    Acquired hypothyroidism    Nicotine dependence, cigarettes, uncomplicated 10/29/2020    Continuous dependence on cigarette smoking    Pain in right arm 03/21/2017    Arm pain, right    Pain in unspecified shoulder 03/19/2020    Chronic pain in shoulder    Personal history of other diseases of the circulatory system 03/19/2020    History of hypertension    Personal history of other diseases of the digestive system 03/19/2020    History of gastroesophageal reflux (GERD)    Personal history of other diseases of the musculoskeletal system and connective tissue  10/29/2020    History of arthritis    Personal history of other diseases of the nervous system and sense organs 11/30/2021    History of restless legs syndrome    Personal history of other diseases of the nervous system and sense organs 03/19/2020    History of restless legs syndrome    Personal history of other diseases of the nervous system and sense organs 03/19/2020    History of restless legs syndrome    Personal history of other diseases of the respiratory system 10/13/2022    History of acute bronchitis    Personal history of other diseases of the respiratory system 10/11/2022    History of acute sinusitis    Personal history of other mental and behavioral disorders 08/08/2016    History of anxiety    Personal history of other specified conditions 11/30/2021    History of insomnia    Personal history of other venous thrombosis and embolism 03/19/2020    History of deep venous thrombosis    Personal history of pulmonary embolism 03/19/2020    History of pulmonary embolism    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 18 g 6    amLODIPine (Norvasc) 5 mg tablet Take 1 tablet (5 mg) by mouth once daily. 90 tablet 3    azithromycin (Zithromax) 500 mg tablet TAKE ONE TABLET BY MOUTH EVERY DAY with lunch      clotrimazole-betamethasone (Lotrisone) cream Apply 1 Application topically 2 times a day. 45 g 1    fluticasone (Flonase) 50  mcg/actuation nasal spray Administer 1 spray into each nostril once daily. Shake gently. Before first use, prime pump. After use, clean tip and replace cap. 16 g 11    guaiFENesin (Mucinex) 600 mg 12 hr tablet Take 1 tablet (600 mg) by mouth every 12 hours.      methylPREDNISolone (Medrol Dospak) 4 mg tablets TAKE BY MOUTH AS DIRECTED ON PACKAGE LABELING FOR 6 DAYS.      pantoprazole (ProtoNix) 40 mg EC tablet TAKE 1 TABLET BY MOUTH ONCE DAILY 90 tablet 1    zolpidem (Ambien) 10 mg tablet Take 1 tablet (10 mg) by mouth as needed at bedtime for sleep. 30 tablet 2     No current facility-administered medications for this visit.     Family History   Problem Relation Name Age of Onset    Hypertension Mother      Heart disease Mother      Heart attack Father       Social History     Socioeconomic History    Marital status:    Tobacco Use    Smoking status: Former     Types: Cigarettes    Smokeless tobacco: Never    Tobacco comments:     patch   Substance and Sexual Activity    Alcohol use: Yes     Alcohol/week: 0.0 - 4.0 standard drinks of alcohol    Drug use: Never     Immunization History   Administered Date(s) Administered    Flu vaccine (IIV4), preservative free *Check age/dose* 09/17/2017, 09/02/2018, 09/07/2019, 08/16/2020, 09/10/2021, 07/30/2022, 09/21/2023    Flu vaccine, trivalent, preservative free, age 6 months and greater (Fluarix/Fluzone/Flulaval) 10/08/2016    Flu vaccine, trivalent, preservative free, no egg protein, age 6 months or greater (Flucelvax) 09/18/2024    Hepatitis A vaccine, age 19 years and greater (HAVRIX) 07/25/2018, 04/08/2019    Hepatitis B vaccine, 18yrs and older(HEPLISAV) 08/16/2022, 05/12/2023    Hepatitis B vaccine, adult *Check Product/Dose* 08/16/2022    Influenza, Unspecified 12/20/2011, 09/21/2012, 09/23/2013, 09/10/2021    Influenza, seasonal, injectable 08/27/2020    MMR vaccine, subcutaneous (MMR II) 08/23/2022, 07/03/2023    Moderna COVID-19 vaccine, 12 years and older  "(50mcg/0.5mL)(Spikevax) 09/22/2023, 09/18/2024    Moderna SARS-CoV-2 Vaccination 02/11/2021, 03/10/2021, 10/29/2021, 04/18/2022    Pfizer COVID-19 vaccine, bivalent, age 12 years and older (30 mcg/0.3 mL) 09/17/2022, 05/12/2023    Pfizer Purple Cap SARS-CoV-2 09/17/2022, 08/19/2023    Pneumococcal Conjugate PCV 7 10/31/2020    Pneumococcal conjugate vaccine, 13-valent (PREVNAR 13) 10/31/2020    Pneumococcal conjugate vaccine, 20-valent (PREVNAR 20) 07/06/2022    Pneumococcal polysaccharide vaccine, 23-valent, age 2 years and older (PNEUMOVAX 23) 12/20/2011, 09/02/2018, 07/03/2023    Poliovirus vaccine, subcutaneous (IPOL) 08/17/2022, 07/03/2023    RESPIRATORY SYNCYTIAL VIRUS (RSV), ELIGIBLE PREGNANT PTS, 0.5 ML (ABRYSVO) 08/19/2023    Td vaccine, age 7 years and older (TENIVAC) 05/12/2023    Tdap vaccine, age 7 year and older (BOOSTRIX, ADACEL) 04/14/2019    Varicella vaccine, subcutaneous (VARIVAX) 07/03/2023    Zoster vaccine, recombinant, adult (SHINGRIX) 08/16/2020, 10/31/2020       Review of Systems  Review of systems is otherwise negative unless stated above or in history of present illness.    Objective   Visit Vitals  /83   Pulse 78   Temp 36.3 °C (97.3 °F)   Ht 1.727 m (5' 8\")   Wt 49.5 kg (109 lb 3.2 oz)   SpO2 95%   BMI 16.60 kg/m²   Smoking Status Former   BSA 1.54 m²     Physical Exam  Constitutional: Cachexia of COPD     General: not in acute distress.   HENT: JVD     Head: Normocephalic and atraumatic.      Nose: Nose normal.   Eyes: Eyeglasses     Extraocular Movements: Extraocular movements intact.      Conjunctiva/sclera: Conjunctivae normal.   Cardiovascular: P2 loud     Rate and Rhythm: Normal rate ,  No M/R/G  Pulmonary: Barrel chest crackles rales rhonchi diminished air entry     Effort: Pulmonary effort is normal.      Breath sounds: Normal, Bilat Equal AE  Skin:     General: Skin is warm.   Neurological:      Mental Status: He is alert and oriented to person, place, and time. "   Psychiatric:    Moderate anxiety without depression or suicide     Mood and Affect: Mood normal.         Behavior: Behavior normal.   Musculoskeletal   FROM in all extremitirs,  Joint-no swelling or tenderness    No visits with results within 4 Month(s) from this visit.   Latest known visit with results is:   Orders Only on 08/24/2024   Component Date Value Ref Range Status    [facility] Alpha-OH-Alprazolam (cu* 08/24/2024 Not Detected   Final    [facility] MDMA- Ecstasy (cutoff 2* 08/24/2024 Not Detected   Final    [facility] Meperidine metabolite (* 08/24/2024 Not Detected   Final    [facility] Norhydrocodone  (cutoff* 08/24/2024 Not Detected   Final    [facility] Morphine (cutoff 20 ng/* 08/24/2024 Not Detected   Final    [facility] Carisoprodol (cut-off 1* 08/24/2024 Negative   Final    [facility] Zolpidem (cutoff 20 ng/* 08/24/2024 Not Detected   Final    [facility] Pregabalin (cutoff 100 * 08/24/2024 Not Detected   Final    [facility] Benzoylecgonine (cutoff* 08/24/2024 Negative   Final    [facility] Amphetamine (cutoff 50 * 08/24/2024 Not Detected   Final    [facility] Fentanyl (cutoff 2 ng/m* 08/24/2024 Not Detected   Final    [facility] Noroxymorphone  (cutoff* 08/24/2024 Not Detected   Final    [facility]* 08/24/2024 See Note   Final    [facility] Marijuana Metabolite (c* 08/24/2024 Negative   Final    [facility] Midazolam (cutoff 20 ng* 08/24/2024 Not Detected   Final    [facility] Diazepam (cutoff 50 ng/* 08/24/2024 Not Detected   Final    [facility] Methylphenidate (cutoff* 08/24/2024 Not Detected   Final    [facility] Methadone (cutoff 150 n* 08/24/2024 Negative   Final    [facility] Naloxone (cutoff 100 ng* 08/24/2024 Not Detected   Final    [facility] Noroxycodone  (cutoff 1* 08/24/2024 Not Detected   Final    [facility] Creatinine, Urine 08/24/2024 165.9  20.0 - 400.0 mg/dL Final    [facility] Temazepam (cutoff 50 ng* 08/24/2024 Not Detected   Final    [facility] Clonazepam  (cutoff 20 n* 08/24/2024 Not Detected   Final    [facility] MDA (cutoff 200 ng/mL) 08/24/2024 Not Detected   Final    [facility] Tapentadol (cutoff 100 * 08/24/2024 Not Detected   Final    [facility] Hydromorphone (cutoff 2* 08/24/2024 Not Detected   Final    [facility] 6-acetylmorphine (cutof* 08/24/2024 Not Detected   Final    [facility]* 08/24/2024 See Below   Final    [facility] Zolpidem Metabolite (cu* 08/24/2024 Present   Final    [facility] Nordiazepam (cutoff 50 * 08/24/2024 Not Detected   Final    [facility] Alprazolam (cutoff 40 n* 08/24/2024 Not Detected   Final    [facility] Methamphetamine (cutoff* 08/24/2024 Not Detected   Final    [facility] Norfentanyl (cutoff 2 n* 08/24/2024 Not Detected   Final    [facility] Hydrocodone (cutoff 40 * 08/24/2024 Not Detected   Final    [facility] PCP (cutoff 25 ng/mL) 08/24/2024 Negative   Final    [facility] Codeine (cutoff 40 ng/m* 08/24/2024 Not Detected   Final    [facility] Alpha-OH-Midazolam (cut* 08/24/2024 Not Detected   Final    [facility] Gabapentin (cutoff 100 * 08/24/2024 Not Detected   Final    [facility] Phentermine (cutoff 100* 08/24/2024 Not Detected   Final    [facility] Tramadol (cutoff 200 ng* 08/24/2024 Negative   Final    [facility] Norbuprenorphine (cutof* 08/24/2024 Not Detected   Final    [facility] Oxymorphone (cutoff 40 * 08/24/2024 Not Detected   Final    [facility] Ethyl Glucuronide (cuto* 08/24/2024 PresumptivePOS   Final    [facility] Lorazepam (cutoff 60 ng* 08/24/2024 Not Detected   Final    [facility] 7-Aminoclonazepam (cuto* 08/24/2024 Not Detected   Final    [facility] MDEA- Keyla (cutoff 200 n* 08/24/2024 Not Detected   Final    [facility] Tapentadol-o-Sulf (cuto* 08/24/2024 Not Detected   Final    [facility] Buprenorphine (cutoff 5* 08/24/2024 Not Detected   Final    [facility] Oxycodone (cutoff 40 ng* 08/24/2024 Not Detected   Final    [facility] E[facility] Pain [facility] [place], [place] * 08/24/2024 See Note   Final    [facility] Barbiturates (cutoff 20* 08/24/2024 Negative   Final    [facility] Oxazepam (cutoff 50 ng/* 08/24/2024 Not Detected   Final       Radiology: Reviewed imaging in powerchart.  No results found.      Charting was completed using voice recognition technology and may include unintended errors.

## 2025-01-23 NOTE — ASSESSMENT & PLAN NOTE
Lung nodule 1.4 cm size right lung seen by myself and pulmonary going to follow-up with Dr. Miller regarding PET scan CAT scan and bronchoscopy advised not to smoke

## 2025-01-28 ENCOUNTER — PATIENT OUTREACH (OUTPATIENT)
Dept: PRIMARY CARE | Facility: CLINIC | Age: 65
End: 2025-01-28
Payer: MEDICARE

## 2025-01-28 NOTE — PROGRESS NOTES
Unable to reach patient for call back after recent hospital stay.  LVM  with call back number for patient to call if needed. If no voicemail available call attempts x 2 were made to contact the patient to assist with any questions or concerns patient may have

## 2025-01-30 LAB
NON-UH HIE ALLEN TEST: YES
NON-UH HIE BASE EXCESS: 0.1 MMOL/L
NON-UH HIE EPAP: 0 CMH20
NON-UH HIE FIO2: 21 %
NON-UH HIE HCO3: 24 MMOL/L (ref 22–26)
NON-UH HIE IPAP: 0 CMH20
NON-UH HIE O2 L/M: 0
NON-UH HIE O2 SATURATION: 80.6 %
NON-UH HIE PCO2: 36.5 MMHG (ref 35–45)
NON-UH HIE PEEP: 0 CMH20
NON-UH HIE PH: 7.44 (ref 7.35–7.45)
NON-UH HIE PO2/FIO2 RATIO: 198 (ref 300–500)
NON-UH HIE PO2: 41.6 MMHG (ref 80–100)
NON-UH HIE PRESSURE SUPPORT.: 0 CMH20
NON-UH HIE RATE: 0 BPM
NON-UH HIE TEMP: 37 DEGC
NON-UH HIE TYPE OF SPECIMEN: ABNORMAL
NON-UH HIE VENTILATION: ABNORMAL
NON-UH HIE VT: 0 ML

## 2025-02-12 ENCOUNTER — LAB (OUTPATIENT)
Dept: LAB | Facility: HOSPITAL | Age: 65
End: 2025-02-12
Payer: MEDICARE

## 2025-02-12 ENCOUNTER — OFFICE VISIT (OUTPATIENT)
Dept: SURGERY | Facility: HOSPITAL | Age: 65
End: 2025-02-12
Payer: MEDICARE

## 2025-02-12 VITALS
DIASTOLIC BLOOD PRESSURE: 78 MMHG | RESPIRATION RATE: 16 BRPM | WEIGHT: 118.6 LBS | OXYGEN SATURATION: 94 % | SYSTOLIC BLOOD PRESSURE: 139 MMHG | BODY MASS INDEX: 18.03 KG/M2 | HEART RATE: 93 BPM | TEMPERATURE: 95.9 F

## 2025-02-12 DIAGNOSIS — J98.4 OTHER DISORDERS OF LUNG: ICD-10-CM

## 2025-02-12 DIAGNOSIS — Z01.812 PRE-PROCEDURE LAB EXAM: ICD-10-CM

## 2025-02-12 DIAGNOSIS — D49.9 NEOPLASM OF UNSPECIFIED BEHAVIOR OF UNSPECIFIED SITE: ICD-10-CM

## 2025-02-12 LAB
ANION GAP SERPL CALC-SCNC: 12 MMOL/L (ref 10–20)
BUN SERPL-MCNC: 7 MG/DL (ref 6–23)
CALCIUM SERPL-MCNC: 9.3 MG/DL (ref 8.6–10.3)
CHLORIDE SERPL-SCNC: 101 MMOL/L (ref 98–107)
CO2 SERPL-SCNC: 30 MMOL/L (ref 21–32)
CREAT SERPL-MCNC: 0.69 MG/DL (ref 0.5–1.05)
EGFRCR SERPLBLD CKD-EPI 2021: >90 ML/MIN/1.73M*2
ERYTHROCYTE [DISTWIDTH] IN BLOOD BY AUTOMATED COUNT: 12.2 % (ref 11.5–14.5)
GLUCOSE SERPL-MCNC: 102 MG/DL (ref 74–99)
HCT VFR BLD AUTO: 37.1 % (ref 36–46)
HGB BLD-MCNC: 12.3 G/DL (ref 12–16)
INR PPP: 0.9 (ref 0.9–1.1)
MCH RBC QN AUTO: 33.7 PG (ref 26–34)
MCHC RBC AUTO-ENTMCNC: 33.2 G/DL (ref 32–36)
MCV RBC AUTO: 102 FL (ref 80–100)
NRBC BLD-RTO: 0 /100 WBCS (ref 0–0)
PLATELET # BLD AUTO: 274 X10*3/UL (ref 150–450)
POTASSIUM SERPL-SCNC: 4.2 MMOL/L (ref 3.5–5.3)
PROTHROMBIN TIME: 10.5 SECONDS (ref 9.8–12.8)
RBC # BLD AUTO: 3.65 X10*6/UL (ref 4–5.2)
SODIUM SERPL-SCNC: 139 MMOL/L (ref 136–145)
WBC # BLD AUTO: 7.8 X10*3/UL (ref 4.4–11.3)

## 2025-02-12 PROCEDURE — 1126F AMNT PAIN NOTED NONE PRSNT: CPT | Performed by: THORACIC SURGERY (CARDIOTHORACIC VASCULAR SURGERY)

## 2025-02-12 PROCEDURE — 99205 OFFICE O/P NEW HI 60 MIN: CPT | Performed by: THORACIC SURGERY (CARDIOTHORACIC VASCULAR SURGERY)

## 2025-02-12 PROCEDURE — 1123F ACP DISCUSS/DSCN MKR DOCD: CPT | Performed by: THORACIC SURGERY (CARDIOTHORACIC VASCULAR SURGERY)

## 2025-02-12 PROCEDURE — 3075F SYST BP GE 130 - 139MM HG: CPT | Performed by: THORACIC SURGERY (CARDIOTHORACIC VASCULAR SURGERY)

## 2025-02-12 PROCEDURE — 99215 OFFICE O/P EST HI 40 MIN: CPT | Mod: 25 | Performed by: THORACIC SURGERY (CARDIOTHORACIC VASCULAR SURGERY)

## 2025-02-12 PROCEDURE — 80048 BASIC METABOLIC PNL TOTAL CA: CPT | Performed by: THORACIC SURGERY (CARDIOTHORACIC VASCULAR SURGERY)

## 2025-02-12 PROCEDURE — 85610 PROTHROMBIN TIME: CPT | Performed by: THORACIC SURGERY (CARDIOTHORACIC VASCULAR SURGERY)

## 2025-02-12 PROCEDURE — 85027 COMPLETE CBC AUTOMATED: CPT | Performed by: THORACIC SURGERY (CARDIOTHORACIC VASCULAR SURGERY)

## 2025-02-12 PROCEDURE — 36415 COLL VENOUS BLD VENIPUNCTURE: CPT | Performed by: THORACIC SURGERY (CARDIOTHORACIC VASCULAR SURGERY)

## 2025-02-12 PROCEDURE — 3078F DIAST BP <80 MM HG: CPT | Performed by: THORACIC SURGERY (CARDIOTHORACIC VASCULAR SURGERY)

## 2025-02-12 ASSESSMENT — PAIN SCALES - GENERAL: PAINLEVEL_OUTOF10: 0-NO PAIN

## 2025-02-12 NOTE — PATIENT INSTRUCTIONS
Dr. Dos Santos wants you to get a Navigational Bronch Biopsy/EBUS Endobronchial Ultrasound    You will get a call in the next few days from  [facility] to schedule your procedure.  They will provide you with location, times and detailed instructions for procedure.      This procedure is done at  [facility].  This is typically an outpatient procedure. You will need someone to drive you home.     Before the Test:  []You will need to have blood work done at least 30 days prior to the procedure.  You can get this done today or at any  lab at your convenience.     ? If you take medications containing Aspirin, Plavix, Eliquis or Coumadin, you MUST ask your doctor when you should stop taking them. You may need to stop taking the medicine up to seven (7) days prior to the test.      Results:  Results can take up to 7-10 business days.  Dr. Dos Santos will call you to discuss the results and next steps.    Call our office if you have not received a call to schedule or with any questions.  [phone]

## 2025-02-12 NOTE — PROGRESS NOTES
"HPI:   Princess Nevarez \"Collin" is a 64 y.o.female referred with a suspicious right upper lobe tumor.  She is a 64-year-old female with a past medical history of COPD and high blood pressure who was admitted to the hospital in January with shortness of breath.  She underwent a CAT scan which showed a new 1.4 cm spiculated nodule as compared to a scan done in March 2023.  A PET scan showed an SUV of 10.9 and this lesion but did not let up anywhere else.  She had no symptoms related to this nodule.  Of note prior to this episode and now she is able to climb 2 flights of stairs without any shortness of breath.  She is active around the yard and denies any chest discomfort.  She has not had any recent episodes of fevers or chills in the last 2 months.    Past Medical History:   Diagnosis Date    Anxiety disorder, unspecified 03/19/2020    Anxiety and depression    Bipolar disorder, currently in remission, most recent episode unspecified (Multi) 03/19/2020    History of depressed bipolar disorder    COPD (chronic obstructive pulmonary disease) (Multi)     Displaced fracture of head of unspecified radius, initial encounter for closed fracture 11/30/2021    Radial head fracture    Dorsalgia, unspecified 03/19/2020    Chronic back pain greater than 3 months duration    Encounter for screening for malignant neoplasm of colon 12/15/2020    Screen for colon cancer    Encounter for screening for malignant neoplasm of rectum 12/15/2020    Screening for rectal cancer    Essential (primary) hypertension 03/19/2020    Benign essential hypertension    Hematuria, unspecified 11/30/2021    Painless hematuria    Hypothyroidism, unspecified 03/19/2020    Acquired hypothyroidism    Nicotine dependence, cigarettes, uncomplicated 10/29/2020    Continuous dependence on cigarette smoking    Pain in right arm 03/21/2017    Arm pain, right    Pain in unspecified shoulder 03/19/2020    Chronic pain in shoulder    Personal history of other " diseases of the circulatory system 03/19/2020    History of hypertension    Personal history of other diseases of the digestive system 03/19/2020    History of gastroesophageal reflux (GERD)    Personal history of other diseases of the musculoskeletal system and connective tissue 10/29/2020    History of arthritis    Personal history of other diseases of the nervous system and sense organs 11/30/2021    History of restless legs syndrome    Personal history of other diseases of the nervous system and sense organs 03/19/2020    History of restless legs syndrome    Personal history of other diseases of the nervous system and sense organs 03/19/2020    History of restless legs syndrome    Personal history of other diseases of the respiratory system 10/13/2022    History of acute bronchitis    Personal history of other diseases of the respiratory system 10/11/2022    History of acute sinusitis    Personal history of other mental and behavioral disorders 08/08/2016    History of anxiety    Personal history of other specified conditions 11/30/2021    History of insomnia    Personal history of other venous thrombosis and embolism 03/19/2020    History of deep venous thrombosis    Personal history of pulmonary embolism 03/19/2020    History of pulmonary embolism    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    DM (diabetes mellitus), type 2 with neurological complications          Current Outpatient Medications:     albuterol 90 mcg/actuation inhaler, Inhale 2 puffs every 4 hours if needed for wheezing., Disp: 18 g, Rfl: 6    amLODIPine (Norvasc) 5 mg tablet, Take 1 tablet (5 mg) by mouth once daily., Disp: 90 tablet, Rfl: 3    azithromycin (Zithromax) 500 mg tablet, TAKE ONE TABLET BY MOUTH EVERY DAY with lunch, Disp: , Rfl:     clotrimazole-betamethasone (Lotrisone) cream, Apply 1 Application topically 2 times a day., Disp: 45 g, Rfl: 1    fluticasone (Flonase) 50 mcg/actuation nasal  spray, Administer 1 spray into each nostril once daily. Shake gently. Before first use, prime pump. After use, clean tip and replace cap., Disp: 16 g, Rfl: 11    guaiFENesin (Mucinex) 600 mg 12 hr tablet, Take 1 tablet (600 mg) by mouth every 12 hours., Disp: , Rfl:     methylPREDNISolone (Medrol Dospak) 4 mg tablets, TAKE BY MOUTH AS DIRECTED ON PACKAGE LABELING FOR 6 DAYS., Disp: , Rfl:     pantoprazole (ProtoNix) 40 mg EC tablet, TAKE 1 TABLET BY MOUTH ONCE DAILY, Disp: 90 tablet, Rfl: 1    PARoxetine (Paxil) 10 mg tablet, Take 1 tablet (10 mg) by mouth once daily at bedtime., Disp: 90 tablet, Rfl: 1    zolpidem (Ambien) 10 mg tablet, Take 1 tablet (10 mg) by mouth as needed at bedtime for sleep., Disp: 30 tablet, Rfl: 2    PSHx:  SHx: c ex smoker quit in 11/24 with a 75 pack year history, denies ETOH, or illicit drugs   FMHx: negative for history of thoracic cancer     ROS  General: negative for fever, chills, weight loss, night sweat  Head: negative for severe headache, vision change, blurred vision,   CV: negative for chest pain, dizziness, lightheadedness   Pulm: negative for shortness of breath, dyspnea on exertion, hemoptysis  GI: negative for diarrhea, constipation, abdominal pain, nausea or vomiting, BRBPR  : negative for dysuria, hematuria, incontinence  Skin: negative for rash  Heme: negative for blood thinner, bleeding disorder or clotting disorder  Endo: negative for heat or cold intolerance, weight gain or weight loss  MSK: negative for rash, edema, weakness    PHYSICAL EXAM  Constitution: well-developed well-nourished in no acute distress  HEENT: NCAT, moist mucosal membrane, neck supple, no crepitus, sclera anicteric  Lymph nodes: no cervical or supraclavicular lymphadenopathy  Cardiac: RRR, normal S1, S2, no mrg  Pulmonary: normal air movement, CTAP, no wcr  Abdomen: soft, non-distended, non-tender, no rigidity, guarding or rebound tenderness, no splenohepatomegaly  Neuro: AOx3, CNII-XII grossly  normal  Ext: warm, dry, no edema noted  Skin: dry, clean and intact  Psych: mood and affect wnl    Results    I reviewed the PFT from 1/30/2025. It showed FEV1 44 percent predicted and DLCO 53 percent predicted   I reviewed the CT scan from 1/20/2025. It showed 1.4 cm central right upper lobe nodule  I reviewed the PET scan from 1/29/2025. It showed SUV of 10.9 in this nodule but nowhere else      Assessment and Plan:    This is a 64-year-old female with moderately severe COPD with a suspicious nodule in the right upper lobe.  We will be ordering a Navigation bronchoscopy biopsy and endobronchial ultrasound biopsy of lymph nodes.  I explained the patient that should cancer be found we would recommend him minimally invasive lobectomy.  I explained alternatives of focused radiation and/or chemotherapy and/or observation.  I also explained that sometimes the results of the biopsy are indeterminate in which case we would have to  consider observation versus intraoperative biopsy.  All of her questions were answered and she consents to  lobectomy if needed    I have recommended a VATS lobectomy.  I mentioned a 1% risk of mortality, and additional risks of bleeding requiring a blood transfusion, infection, blood clots, irregular heartbeats and slow healing of the lung.  I mentioned a chance that a larger amount of lung would need to be removed in order to remove all of the cancer.  I explained alternatives of chemotherapy and/or focused radiation.  All questions were answered and the patient consents to lobectomy.     Luis Dos Santos MD  Chief Division of Thoracic and Esophageal Surgery    [facility]   Co-Director, Thoracic Oncology Program    [facility]  Professor of Surgery    [employer]  Office phone: [phone]  Fax: [phone]

## 2025-02-13 DIAGNOSIS — Z01.818 PRE-OP TESTING: ICD-10-CM

## 2025-02-13 DIAGNOSIS — R91.1 LUNG NODULE: Primary | ICD-10-CM

## 2025-02-13 NOTE — PROGRESS NOTES
Bronchoscopy Scheduling Request    Pre-bronchoscopy visit: New patient visit with Bronchoscopy group provider  Please schedule procedure: Next available    Cytology on-site:  Yes  Location:  [facility]  Performing physician:  Advanced diagnostic bronchoscopist  Referring physician:  Luis Dos Santos MD, Frances Valentin MD  Indication:  right upper lobe nodule  Sedation / Anesthesia:  GA  Procedure:  Airway exam, TBNA, TBBx, Navigational bronchoscopy, Radial EBUS, Staging EBUS  Time:  Tier 3  Fluorscopy:   Yes  Imaging needed:  CT Praneeth - same day as procedure  Labs:  CBC, BMP  Meds:  None  Special Considerations:  None  Reviewed by:  Gregorio Campbell MD

## 2025-02-18 ENCOUNTER — HOSPITAL ENCOUNTER (OUTPATIENT)
Dept: RESPIRATORY THERAPY | Facility: HOSPITAL | Age: 65
Discharge: HOME | End: 2025-02-18
Payer: MEDICARE

## 2025-02-18 DIAGNOSIS — D49.9 NEOPLASM OF UNSPECIFIED BEHAVIOR OF UNSPECIFIED SITE: ICD-10-CM

## 2025-02-18 PROCEDURE — 94618 PULMONARY STRESS TESTING: CPT

## 2025-02-19 ENCOUNTER — APPOINTMENT (OUTPATIENT)
Dept: SURGERY | Facility: HOSPITAL | Age: 65
End: 2025-02-19
Payer: MEDICARE

## 2025-02-24 ENCOUNTER — APPOINTMENT (OUTPATIENT)
Dept: PULMONOLOGY | Facility: CLINIC | Age: 65
End: 2025-02-24
Payer: MEDICARE

## 2025-02-24 VITALS — HEIGHT: 66 IN | BODY MASS INDEX: 19.13 KG/M2 | WEIGHT: 119 LBS

## 2025-02-24 DIAGNOSIS — Z01.811 PREOP PULMONARY/RESPIRATORY EXAM: Primary | ICD-10-CM

## 2025-02-24 DIAGNOSIS — R91.1 LUNG NODULE: ICD-10-CM

## 2025-02-24 PROCEDURE — 1159F MED LIST DOCD IN RCRD: CPT | Performed by: INTERNAL MEDICINE

## 2025-02-24 PROCEDURE — 1036F TOBACCO NON-USER: CPT | Performed by: INTERNAL MEDICINE

## 2025-02-24 PROCEDURE — 1123F ACP DISCUSS/DSCN MKR DOCD: CPT | Performed by: INTERNAL MEDICINE

## 2025-02-24 PROCEDURE — 99213 OFFICE O/P EST LOW 20 MIN: CPT | Performed by: INTERNAL MEDICINE

## 2025-02-24 PROCEDURE — 3008F BODY MASS INDEX DOCD: CPT | Performed by: INTERNAL MEDICINE

## 2025-02-24 RX ORDER — FLUTICASONE FUROATE, UMECLIDINIUM BROMIDE AND VILANTEROL TRIFENATATE 100; 62.5; 25 UG/1; UG/1; UG/1
1 POWDER RESPIRATORY (INHALATION)
COMMUNITY
End: 2025-02-25 | Stop reason: SDUPTHER

## 2025-02-24 NOTE — H&P (VIEW-ONLY)
"Subjective   Patient ID: Princess Nevarez \"Collin" is a 64 y.o. female who presents for Pre Bronchoscopy.  HPI  Patient was contacted by telephone this afternoon and was at her place of residence.  She is scheduled for a bronchoscopy on 2/26/2025 at [facility] at [address]  She is scheduled to arrive at the area at 9 AM at the [facility] to get CT scans and subsequent to that had scheduled a bronchoscopy at 9:30 AM at [address].  There is question of a right upper lobe nodule.  Patient denies any problems with sore throat, hemoptysis, fever.  Patient denies any current angina and no recent heart attacks or heart failure.  She was also treated for pneumonia in October 2024.  There was no history of COVID-pneumonia.  The patient reports on the average drinking 4 glasses of wine a week and she was a 1-1/2 pack/day smoker from 19 74-11 1/20/2024.  The patient had films done at [facility] which I did not have access to and also had some much earlier films done at [facility].  I answered all the patient's questions to her satisfaction.  Review of Systems  Not done.  Objective   Physical Exam  Not done.  Assessment/Plan        1.  Preop pulmonary/respiratory exam.  2.  Lung nodule.        This note was transcribed using the Dragon Dictation system.  There may be grammatical, punctuation, or verbiage errors that occur with voice recognition programs.  Ag Escamilla,  02/24/25 3:11 PM   "

## 2025-02-24 NOTE — PROGRESS NOTES
"Subjective   Patient ID: Princess Nevarez \"Collin" is a 64 y.o. female who presents for Pre Bronchoscopy.  HPI  Patient was contacted by telephone this afternoon and was at her place of residence.  She is scheduled for a bronchoscopy on 2/26/2025 at [facility] at [address]  She is scheduled to arrive at the area at 9 AM at the [facility] to get CT scans and subsequent to that had scheduled a bronchoscopy at 9:30 AM at [address].  There is question of a right upper lobe nodule.  Patient denies any problems with sore throat, hemoptysis, fever.  Patient denies any current angina and no recent heart attacks or heart failure.  She was also treated for pneumonia in October 2024.  There was no history of COVID-pneumonia.  The patient reports on the average drinking 4 glasses of wine a week and she was a 1-1/2 pack/day smoker from 19 74-11 1/20/2024.  The patient had films done at [facility] which I did not have access to and also had some much earlier films done at [facility].  I answered all the patient's questions to her satisfaction.  Review of Systems  Not done.  Objective   Physical Exam  Not done.  Assessment/Plan        1.  Preop pulmonary/respiratory exam.  2.  Lung nodule.        This note was transcribed using the Dragon Dictation system.  There may be grammatical, punctuation, or verbiage errors that occur with voice recognition programs.  Ag Escamilla,  02/24/25 3:11 PM   "

## 2025-02-25 ENCOUNTER — ANESTHESIA EVENT (OUTPATIENT)
Dept: GASTROENTEROLOGY | Facility: HOSPITAL | Age: 65
End: 2025-02-25
Payer: MEDICARE

## 2025-02-25 ENCOUNTER — APPOINTMENT (OUTPATIENT)
Dept: PRIMARY CARE | Facility: CLINIC | Age: 65
End: 2025-02-25
Payer: MEDICARE

## 2025-02-25 VITALS
HEIGHT: 66 IN | HEART RATE: 88 BPM | WEIGHT: 119 LBS | DIASTOLIC BLOOD PRESSURE: 79 MMHG | TEMPERATURE: 97.7 F | SYSTOLIC BLOOD PRESSURE: 156 MMHG | BODY MASS INDEX: 19.13 KG/M2 | OXYGEN SATURATION: 95 %

## 2025-02-25 DIAGNOSIS — R91.1 LUNG NODULE: ICD-10-CM

## 2025-02-25 DIAGNOSIS — F51.01 PRIMARY INSOMNIA: ICD-10-CM

## 2025-02-25 DIAGNOSIS — F41.1 GAD (GENERALIZED ANXIETY DISORDER): ICD-10-CM

## 2025-02-25 DIAGNOSIS — K21.00 GASTROESOPHAGEAL REFLUX DISEASE WITH ESOPHAGITIS WITHOUT HEMORRHAGE: ICD-10-CM

## 2025-02-25 DIAGNOSIS — I10 BENIGN ESSENTIAL HYPERTENSION: Primary | ICD-10-CM

## 2025-02-25 DIAGNOSIS — J44.1 COPD EXACERBATION (MULTI): ICD-10-CM

## 2025-02-25 DIAGNOSIS — Z00.00 MEDICARE ANNUAL WELLNESS VISIT, SUBSEQUENT: ICD-10-CM

## 2025-02-25 PROCEDURE — 1170F FXNL STATUS ASSESSED: CPT | Performed by: INTERNAL MEDICINE

## 2025-02-25 PROCEDURE — 99497 ADVNCD CARE PLAN 30 MIN: CPT | Performed by: INTERNAL MEDICINE

## 2025-02-25 PROCEDURE — 3078F DIAST BP <80 MM HG: CPT | Performed by: INTERNAL MEDICINE

## 2025-02-25 PROCEDURE — 1036F TOBACCO NON-USER: CPT | Performed by: INTERNAL MEDICINE

## 2025-02-25 PROCEDURE — 1160F RVW MEDS BY RX/DR IN RCRD: CPT | Performed by: INTERNAL MEDICINE

## 2025-02-25 PROCEDURE — 1123F ACP DISCUSS/DSCN MKR DOCD: CPT | Performed by: INTERNAL MEDICINE

## 2025-02-25 PROCEDURE — 1159F MED LIST DOCD IN RCRD: CPT | Performed by: INTERNAL MEDICINE

## 2025-02-25 PROCEDURE — 99213 OFFICE O/P EST LOW 20 MIN: CPT | Performed by: INTERNAL MEDICINE

## 2025-02-25 PROCEDURE — 3008F BODY MASS INDEX DOCD: CPT | Performed by: INTERNAL MEDICINE

## 2025-02-25 PROCEDURE — 3077F SYST BP >= 140 MM HG: CPT | Performed by: INTERNAL MEDICINE

## 2025-02-25 PROCEDURE — G0439 PPPS, SUBSEQ VISIT: HCPCS | Performed by: INTERNAL MEDICINE

## 2025-02-25 RX ORDER — PAROXETINE 10 MG/1
10 TABLET, FILM COATED ORAL NIGHTLY
Qty: 90 TABLET | Refills: 3 | Status: SHIPPED | OUTPATIENT
Start: 2025-02-25

## 2025-02-25 RX ORDER — METOPROLOL SUCCINATE 25 MG/1
25 TABLET, EXTENDED RELEASE ORAL DAILY
Qty: 90 TABLET | Refills: 3 | Status: SHIPPED | OUTPATIENT
Start: 2025-02-25

## 2025-02-25 RX ORDER — FLUTICASONE FUROATE, UMECLIDINIUM BROMIDE AND VILANTEROL TRIFENATATE 100; 62.5; 25 UG/1; UG/1; UG/1
1 POWDER RESPIRATORY (INHALATION)
Qty: 60 EACH | Refills: 6 | Status: SHIPPED | OUTPATIENT
Start: 2025-02-25

## 2025-02-25 ASSESSMENT — PATIENT HEALTH QUESTIONNAIRE - PHQ9
1. LITTLE INTEREST OR PLEASURE IN DOING THINGS: NOT AT ALL
2. FEELING DOWN, DEPRESSED OR HOPELESS: NOT AT ALL
10. IF YOU CHECKED OFF ANY PROBLEMS, HOW DIFFICULT HAVE THESE PROBLEMS MADE IT FOR YOU TO DO YOUR WORK, TAKE CARE OF THINGS AT HOME, OR GET ALONG WITH OTHER PEOPLE: SOMEWHAT DIFFICULT
SUM OF ALL RESPONSES TO PHQ9 QUESTIONS 1 AND 2: 2
2. FEELING DOWN, DEPRESSED OR HOPELESS: SEVERAL DAYS
SUM OF ALL RESPONSES TO PHQ9 QUESTIONS 1 AND 2: 0
1. LITTLE INTEREST OR PLEASURE IN DOING THINGS: SEVERAL DAYS

## 2025-02-25 ASSESSMENT — ACTIVITIES OF DAILY LIVING (ADL)
DOING_HOUSEWORK: INDEPENDENT
DRESSING: INDEPENDENT
MANAGING_FINANCES: INDEPENDENT
TAKING_MEDICATION: INDEPENDENT
BATHING: INDEPENDENT
GROCERY_SHOPPING: INDEPENDENT

## 2025-02-25 NOTE — PROGRESS NOTES
Assessment and Plan:  Problem List Items Addressed This Visit       Benign essential hypertension - Primary     Toprol-XL 25 mg a day BMP uric acid magnesium twice a year         Relevant Orders    Albumin-Creatinine Ratio, Urine Random    CBC and Auto Differential    Comprehensive Metabolic Panel    Lipid Panel    Magnesium    TSH with reflex to Free T4 if abnormal    Uric Acid    Urinalysis with Reflex Microscopic    GERD (gastroesophageal reflux disease)     Protonix 40 mg 1 a day magnesium H. pylori once a year         Relevant Orders    Albumin-Creatinine Ratio, Urine Random    CBC and Auto Differential    Comprehensive Metabolic Panel    Lipid Panel    Magnesium    TSH with reflex to Free T4 if abnormal    Uric Acid    Urinalysis with Reflex Microscopic    Insomnia     I personally reviewed the OARRS report for this patient. This report is scanned into the electronic medical record. I have considered  the risks of abuse, dependence, addiction and diversion. After discussion, patient does have a good understanding of the safety and  risks of this medication. I believe that it is clinically appropriate for this patient to be prescribed this medication.  See patient back in 6 weeks.         Relevant Orders    Albumin-Creatinine Ratio, Urine Random    CBC and Auto Differential    Comprehensive Metabolic Panel    Lipid Panel    Magnesium    TSH with reflex to Free T4 if abnormal    Uric Acid    Urinalysis with Reflex Microscopic    COPD exacerbation (Multi)     PFT once a year flu pneumonia COVID-19 vaccines continue Trelegy albuterol pulmonary rehab         Relevant Medications    fluticasone-umeclidin-vilanter (Trelegy Ellipta) 100-62.5-25 mcg blister with device    Other Relevant Orders    Albumin-Creatinine Ratio, Urine Random    CBC and Auto Differential    Comprehensive Metabolic Panel    Lipid Panel    Magnesium    TSH with reflex to Free T4 if abnormal    Uric Acid    Urinalysis with Reflex Microscopic     CON (generalized anxiety disorder)     Continue Paxil PHQ 6 not suicidal         Relevant Medications    PARoxetine (Paxil) 10 mg tablet    Other Relevant Orders    Albumin-Creatinine Ratio, Urine Random    CBC and Auto Differential    Comprehensive Metabolic Panel    Lipid Panel    Magnesium    TSH with reflex to Free T4 if abnormal    Uric Acid    Urinalysis with Reflex Microscopic    Lung nodule     Need biopsy rule out cancer         Relevant Orders    Albumin-Creatinine Ratio, Urine Random    CBC and Auto Differential    Comprehensive Metabolic Panel    Lipid Panel    Magnesium    TSH with reflex to Free T4 if abnormal    Uric Acid    Urinalysis with Reflex Microscopic     Other Visit Diagnoses       Medicare annual wellness visit, subsequent        Relevant Orders    Albumin-Creatinine Ratio, Urine Random    CBC and Auto Differential    Comprehensive Metabolic Panel    Lipid Panel    Magnesium    TSH with reflex to Free T4 if abnormal    Uric Acid    Urinalysis with Reflex Microscopic              Chief Complaint:   Annual Medicare Wellness Office Exam/Comprehensive Problem Focused Follow Up and Physical ExamShe is a 64-year-old female with a past medical history of COPD and high blood pressure who was admitted to the hospital in January with shortness of breath. She underwent a CAT scan which showed a new 1.4 cm spiculated nodule as compared to a scan done in March 2023. A PET scan showed an SUV of 10.9 and this lesion but did not let up anywhere else. She had no symptoms related to this nodule. Of note prior to this episode and now she is able to climb 2 flights of stairs without any shortness of breath. She is active around the yard and denies any chest discomfort. She has not had any recent episodes of fevers or chills in the last 2 months.       HPI: 64-year-old patient  with 3 children    No brother for sister    Mother have dementia    Father of heart attack    Smoked for more than 30 years  stopped in November    Alcohol moderate    Work-related    Personal history of COPD lung nodule gastritis hypertension hyperlipidemia anxiety depression insomnia    Negative for Apetex hemoptysis hematuria rectal bleeding    Negative for weight loss or night sweats    Negative for tuberculosis        Patient Active Problem List:  Patient Active Problem List   Diagnosis    Benign essential hypertension    GERD (gastroesophageal reflux disease)    Insomnia    COPD exacerbation (Multi)    CON (generalized anxiety disorder)    Hospital discharge follow-up    Lung nodule          Comprehensive Medical/Surgical/Social/Family History:  Family History   Problem Relation Name Age of Onset    Hypertension Mother      Heart disease Mother      Heart attack Father         Past Medical History:   Diagnosis Date    Anxiety disorder, unspecified 03/19/2020    Anxiety and depression    Bipolar disorder, currently in remission, most recent episode unspecified (Multi) 03/19/2020    History of depressed bipolar disorder    COPD (chronic obstructive pulmonary disease) (Multi)     Displaced fracture of head of unspecified radius, initial encounter for closed fracture 11/30/2021    Radial head fracture    Dorsalgia, unspecified 03/19/2020    Chronic back pain greater than 3 months duration    Encounter for screening for malignant neoplasm of colon 12/15/2020    Screen for colon cancer    Encounter for screening for malignant neoplasm of rectum 12/15/2020    Screening for rectal cancer    Essential (primary) hypertension 03/19/2020    Benign essential hypertension    Hematuria, unspecified 11/30/2021    Painless hematuria    Hypothyroidism, unspecified 03/19/2020    Acquired hypothyroidism    Nicotine dependence, cigarettes, uncomplicated 10/29/2020    Continuous dependence on cigarette smoking    Pain in right arm 03/21/2017    Arm pain, right    Pain in unspecified shoulder 03/19/2020    Chronic pain in shoulder    Personal history of  other diseases of the circulatory system 03/19/2020    History of hypertension    Personal history of other diseases of the digestive system 03/19/2020    History of gastroesophageal reflux (GERD)    Personal history of other diseases of the musculoskeletal system and connective tissue 10/29/2020    History of arthritis    Personal history of other diseases of the nervous system and sense organs 11/30/2021    History of restless legs syndrome    Personal history of other diseases of the nervous system and sense organs 03/19/2020    History of restless legs syndrome    Personal history of other diseases of the nervous system and sense organs 03/19/2020    History of restless legs syndrome    Personal history of other diseases of the respiratory system 10/13/2022    History of acute bronchitis    Personal history of other diseases of the respiratory system 10/11/2022    History of acute sinusitis    Personal history of other mental and behavioral disorders 08/08/2016    History of anxiety    Personal history of other specified conditions 11/30/2021    History of insomnia    Personal history of other venous thrombosis and embolism 03/19/2020    History of deep venous thrombosis    Personal history of pulmonary embolism 03/19/2020    History of pulmonary embolism    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    DM (diabetes mellitus), type 2 with neurological complications       Past Surgical History:   Procedure Laterality Date    GALLBLADDER SURGERY  01/22/2015    Gallbladder Surgery    HYSTERECTOMY  01/22/2015    Hysterectomy    OTHER SURGICAL HISTORY  03/19/2020    Hysterectomy    OTHER SURGICAL HISTORY  03/19/2020    Gallbladder surgery       Social History     Socioeconomic History    Marital status:    Tobacco Use    Smoking status: Former     Current packs/day: 0.00     Average packs/day: 1.5 packs/day for 50.8 years (76.3 ttl pk-yrs)     Types: Cigarettes     Start  date:      Quit date: 2024     Years since quittin.3    Smokeless tobacco: Never    Tobacco comments:     Quit in '   Substance and Sexual Activity    Alcohol use: Yes     Alcohol/week: 0.0 - 4.0 standard drinks of alcohol    Drug use: Never       Tobacco/Alcohol/Opioid use, as well as Illicit Drug Use was screened for/reviewed and documented in Social Documentation section of the chart and medication list as appropriate    Allergies and Medications  Allergies   Allergen Reactions    Penicillins Other     Current Outpatient Medications   Medication Sig Dispense Refill    albuterol 90 mcg/actuation inhaler Inhale 2 puffs every 4 hours if needed for wheezing. 18 g 6    amLODIPine (Norvasc) 5 mg tablet Take 1 tablet (5 mg) by mouth once daily. 90 tablet 3    clotrimazole-betamethasone (Lotrisone) cream Apply 1 Application topically 2 times a day. 45 g 1    fluticasone (Flonase) 50 mcg/actuation nasal spray Administer 1 spray into each nostril once daily. Shake gently. Before first use, prime pump. After use, clean tip and replace cap. 16 g 11    pantoprazole (ProtoNix) 40 mg EC tablet TAKE 1 TABLET BY MOUTH ONCE DAILY 90 tablet 1    zolpidem (Ambien) 10 mg tablet Take 1 tablet (10 mg) by mouth as needed at bedtime for sleep. 30 tablet 2    fluticasone-umeclidin-vilanter (Trelegy Ellipta) 100-62.5-25 mcg blister with device Inhale 1 puff once daily. 60 each 6    PARoxetine (Paxil) 10 mg tablet Take 1 tablet (10 mg) by mouth once daily at bedtime. 90 tablet 3     No current facility-administered medications for this visit.       Medications and Supplements  prescribed by me and other practitioners or clinical pharmacist (such as prescriptions, OTC's, herbal therapies and supplements) were reviewed and documented in the medical record.     Advance directives  Advanced Care Planning (including a Living Will, Healthcare POA, as well as specific end of life choices and/or directives), was discussed for  "approximately 16 minutes with the patient and/or surrogate, voluntarily, and documented in the Problem List of the medical record.     Cardiac Risk Assessment  Cardiovascular risk was discussed and, if needed, lifestyle modifications recommended, including nutritional choices, exercise, and elimination of habits contributing to risk. We agreed on a plan to reduce the current cardiovascular risk based on above discussion as needed.  Aspirin use/disuse was discussed and documented in the Problem List of the medical record after reviewing the updated guidelines below:    Consider low dose Aspirin ( mg) use if the benefit for cardiovascular disease prevention outweighs risk for bleeding complications.   In general, low dose ASA should be considered:  In patients WITHOUT prior MI/stroke/PAD (primary prevention):   a. Age <60: Use if 10-year cardiovascular disease risk >20%, with discussion of risks and benefits with patient  b. Age 60-<70: Use if 10-year cardiovascular disease risk >20% and low bleeding (e.g., gastrointenstinal) risk, with discussion of risks and benefits with patient  c. Age >=70: Do not use    In patients WITH prior MI/stroke/PAD (secondary prevention):   Generally use unless extremely high bleeding (e.g., gastrointenstinal) risk, with discussion of risks and benefits with patient    ROS otherwise negative aside from what was mentioned above in HPI.    Visit Vitals  /79   Pulse 88   Temp 36.5 °C (97.7 °F)   Ht 1.676 m (5' 6\")   Wt 54 kg (119 lb)   SpO2 95%   BMI 19.21 kg/m²   Smoking Status Former   BSA 1.59 m²       Physical Exam  Physical Exam: Cachexia of chronic disease    Appearance: Alert, oriented , cooperative,  in no acute distress. Well nourished & well hydrated.    Skin: Intact,  dry skin, no lesions, rash, petechiae or purpura.     Eyes: Eyeglasses    ENT: Hearing grossly intact. External auditory canals patent, tympanic membranes intact with visible landmarks. Nares patent, " mucus membranes moist. Dentition without lesions. Pharynx clear, uvula midline.     Neck: Supple, without meningismus. Thyroid not palpable. Trachea at midline. No lymphadenopathy.    Pulmonary: Crackles rales rhonchi.    Cardiac: Mitral systolic heart murmur  Abdomen: Soft, nontender, active bowel sounds.  No palpable organomegaly.  No rebound or guarding.  No CVA tenderness.    Genitourinary: Exam deferred.    Musculoskeletal: Generalized osteopenia  Neurological:  Cranial nerves II through XII are grossly intact, finger-nose touch is normal, normal sensation, no weakness, no focal findings identified.    Psychiatric: Anxiety depression without suicide      During the course of the visit the patient was educated and counseled about age appropriate screening and preventive services. Completed preventive screenings were documented in the chart and orders were placed for outstanding screenings/procedures as documented in the Assessment and Plan.    Patient Instructions (the written plan) was given to the patient at check out.    Charting was completed using voice recognition technology and may include unintended errors.

## 2025-02-26 ENCOUNTER — HOSPITAL ENCOUNTER (OUTPATIENT)
Dept: RADIOLOGY | Facility: HOSPITAL | Age: 65
Discharge: HOME | End: 2025-02-26
Payer: MEDICARE

## 2025-02-26 ENCOUNTER — HOSPITAL ENCOUNTER (OUTPATIENT)
Dept: GASTROENTEROLOGY | Facility: HOSPITAL | Age: 65
Discharge: HOME | End: 2025-02-26
Payer: MEDICARE

## 2025-02-26 ENCOUNTER — ANESTHESIA (OUTPATIENT)
Dept: GASTROENTEROLOGY | Facility: HOSPITAL | Age: 65
End: 2025-02-26
Payer: MEDICARE

## 2025-02-26 VITALS
BODY MASS INDEX: 18.96 KG/M2 | HEART RATE: 70 BPM | RESPIRATION RATE: 19 BRPM | WEIGHT: 118 LBS | HEIGHT: 66 IN | SYSTOLIC BLOOD PRESSURE: 129 MMHG | TEMPERATURE: 97.2 F | DIASTOLIC BLOOD PRESSURE: 64 MMHG | OXYGEN SATURATION: 96 %

## 2025-02-26 DIAGNOSIS — R91.1 LUNG NODULE: ICD-10-CM

## 2025-02-26 PROBLEM — Z98.890 PONV (POSTOPERATIVE NAUSEA AND VOMITING): Status: ACTIVE | Noted: 2025-02-26

## 2025-02-26 PROBLEM — R11.2 PONV (POSTOPERATIVE NAUSEA AND VOMITING): Status: ACTIVE | Noted: 2025-02-26

## 2025-02-26 PROCEDURE — 31627 NAVIGATIONAL BRONCHOSCOPY: CPT | Performed by: INTERNAL MEDICINE

## 2025-02-26 PROCEDURE — 7100000002 HC RECOVERY ROOM TIME - EACH INCREMENTAL 1 MINUTE

## 2025-02-26 PROCEDURE — 7100000009 HC PHASE TWO TIME - INITIAL BASE CHARGE

## 2025-02-26 PROCEDURE — 71045 X-RAY EXAM CHEST 1 VIEW: CPT

## 2025-02-26 PROCEDURE — 7100000010 HC PHASE TWO TIME - EACH INCREMENTAL 1 MINUTE

## 2025-02-26 PROCEDURE — 31653 BRONCH EBUS SAMPLNG 3/> NODE: CPT | Performed by: INTERNAL MEDICINE

## 2025-02-26 PROCEDURE — 2500000004 HC RX 250 GENERAL PHARMACY W/ HCPCS (ALT 636 FOR OP/ED)

## 2025-02-26 PROCEDURE — 71250 CT THORAX DX C-: CPT

## 2025-02-26 PROCEDURE — 31623 DX BRONCHOSCOPE/BRUSH: CPT | Performed by: INTERNAL MEDICINE

## 2025-02-26 PROCEDURE — 7100000001 HC RECOVERY ROOM TIME - INITIAL BASE CHARGE

## 2025-02-26 PROCEDURE — 31654 BRONCH EBUS IVNTJ PERPH LES: CPT | Performed by: INTERNAL MEDICINE

## 2025-02-26 PROCEDURE — 3700000002 HC GENERAL ANESTHESIA TIME - EACH INCREMENTAL 1 MINUTE

## 2025-02-26 PROCEDURE — A31653 PR BRNCHSC EBUS GUIDED SAMPL 3/> NODE STATION/STRUX: Performed by: ANESTHESIOLOGY

## 2025-02-26 PROCEDURE — P9045 ALBUMIN (HUMAN), 5%, 250 ML: HCPCS | Mod: JZ,TB

## 2025-02-26 PROCEDURE — 31622 DX BRONCHOSCOPE/WASH: CPT | Performed by: INTERNAL MEDICINE

## 2025-02-26 PROCEDURE — A31653 PR BRNCHSC EBUS GUIDED SAMPL 3/> NODE STATION/STRUX

## 2025-02-26 PROCEDURE — 2720000007 HC OR 272 NO HCPCS

## 2025-02-26 PROCEDURE — 3700000001 HC GENERAL ANESTHESIA TIME - INITIAL BASE CHARGE

## 2025-02-26 RX ORDER — PHENYLEPHRINE HCL IN 0.9% NACL 0.4MG/10ML
SYRINGE (ML) INTRAVENOUS AS NEEDED
Status: DISCONTINUED | OUTPATIENT
Start: 2025-02-26 | End: 2025-02-26

## 2025-02-26 RX ORDER — LIDOCAINE HCL/PF 100 MG/5ML
SYRINGE (ML) INTRAVENOUS AS NEEDED
Status: DISCONTINUED | OUTPATIENT
Start: 2025-02-26 | End: 2025-02-26

## 2025-02-26 RX ORDER — SODIUM CHLORIDE, SODIUM LACTATE, POTASSIUM CHLORIDE, CALCIUM CHLORIDE 600; 310; 30; 20 MG/100ML; MG/100ML; MG/100ML; MG/100ML
100 INJECTION, SOLUTION INTRAVENOUS CONTINUOUS
Status: DISCONTINUED | OUTPATIENT
Start: 2025-02-26 | End: 2025-02-27 | Stop reason: HOSPADM

## 2025-02-26 RX ORDER — IPRATROPIUM BROMIDE AND ALBUTEROL SULFATE 2.5; .5 MG/3ML; MG/3ML
SOLUTION RESPIRATORY (INHALATION)
COMMUNITY
Start: 2025-01-25

## 2025-02-26 RX ORDER — PROPOFOL 10 MG/ML
INJECTION, EMULSION INTRAVENOUS AS NEEDED
Status: DISCONTINUED | OUTPATIENT
Start: 2025-02-26 | End: 2025-02-26

## 2025-02-26 RX ORDER — LIDOCAINE HYDROCHLORIDE 10 MG/ML
0.1 INJECTION, SOLUTION EPIDURAL; INFILTRATION; INTRACAUDAL; PERINEURAL ONCE
Status: DISCONTINUED | OUTPATIENT
Start: 2025-02-26 | End: 2025-02-27 | Stop reason: HOSPADM

## 2025-02-26 RX ORDER — ALBUTEROL SULFATE 0.83 MG/ML
2.5 SOLUTION RESPIRATORY (INHALATION) ONCE AS NEEDED
Status: DISCONTINUED | OUTPATIENT
Start: 2025-02-26 | End: 2025-02-27 | Stop reason: HOSPADM

## 2025-02-26 RX ORDER — GLYCOPYRROLATE 0.2 MG/ML
INJECTION INTRAMUSCULAR; INTRAVENOUS AS NEEDED
Status: DISCONTINUED | OUTPATIENT
Start: 2025-02-26 | End: 2025-02-26

## 2025-02-26 RX ORDER — ALBUMIN HUMAN 50 G/1000ML
SOLUTION INTRAVENOUS AS NEEDED
Status: DISCONTINUED | OUTPATIENT
Start: 2025-02-26 | End: 2025-02-26

## 2025-02-26 RX ORDER — ONDANSETRON HYDROCHLORIDE 2 MG/ML
4 INJECTION, SOLUTION INTRAVENOUS ONCE AS NEEDED
Status: DISCONTINUED | OUTPATIENT
Start: 2025-02-26 | End: 2025-02-27 | Stop reason: HOSPADM

## 2025-02-26 RX ORDER — ROCURONIUM BROMIDE 10 MG/ML
INJECTION, SOLUTION INTRAVENOUS AS NEEDED
Status: DISCONTINUED | OUTPATIENT
Start: 2025-02-26 | End: 2025-02-26

## 2025-02-26 RX ORDER — ACETAMINOPHEN 325 MG/1
650 TABLET ORAL EVERY 4 HOURS PRN
Status: DISCONTINUED | OUTPATIENT
Start: 2025-02-26 | End: 2025-02-27 | Stop reason: HOSPADM

## 2025-02-26 RX ADMIN — SODIUM CHLORIDE, SODIUM LACTATE, POTASSIUM CHLORIDE, AND CALCIUM CHLORIDE: 600; 310; 30; 20 INJECTION, SOLUTION INTRAVENOUS at 08:35

## 2025-02-26 RX ADMIN — Medication 120 MCG: at 09:18

## 2025-02-26 RX ADMIN — Medication 160 MCG: at 09:31

## 2025-02-26 RX ADMIN — PROPOFOL 150 MG: 10 INJECTION, EMULSION INTRAVENOUS at 08:42

## 2025-02-26 RX ADMIN — Medication 160 MCG: at 09:36

## 2025-02-26 RX ADMIN — ROCURONIUM 20 MG: 50 INJECTION, SOLUTION INTRAVENOUS at 09:44

## 2025-02-26 RX ADMIN — PROPOFOL 20 MG: 10 INJECTION, EMULSION INTRAVENOUS at 08:48

## 2025-02-26 RX ADMIN — ALBUMIN HUMAN 250 ML: 0.05 INJECTION, SOLUTION INTRAVENOUS at 09:55

## 2025-02-26 RX ADMIN — Medication 80 MCG: at 09:39

## 2025-02-26 RX ADMIN — ROCURONIUM 50 MG: 50 INJECTION, SOLUTION INTRAVENOUS at 08:42

## 2025-02-26 RX ADMIN — SUGAMMADEX 200 MG: 100 INJECTION, SOLUTION INTRAVENOUS at 11:05

## 2025-02-26 RX ADMIN — GLYCOPYRROLATE 0.1 MG: 0.2 INJECTION INTRAMUSCULAR; INTRAVENOUS at 10:43

## 2025-02-26 RX ADMIN — Medication 80 MCG: at 08:45

## 2025-02-26 RX ADMIN — LIDOCAINE HYDROCHLORIDE 60 MG: 20 INJECTION, SOLUTION INTRAVENOUS at 08:42

## 2025-02-26 RX ADMIN — DEXAMETHASONE SODIUM PHOSPHATE 8 MG: 4 INJECTION INTRA-ARTICULAR; INTRALESIONAL; INTRAMUSCULAR; INTRAVENOUS; SOFT TISSUE at 09:01

## 2025-02-26 RX ADMIN — PROPOFOL 125 MCG/KG/MIN: 10 INJECTION, EMULSION INTRAVENOUS at 08:43

## 2025-02-26 RX ADMIN — Medication 120 MCG: at 09:19

## 2025-02-26 RX ADMIN — Medication 80 MCG: at 09:13

## 2025-02-26 RX ADMIN — ROCURONIUM 30 MG: 50 INJECTION, SOLUTION INTRAVENOUS at 10:10

## 2025-02-26 ASSESSMENT — COLUMBIA-SUICIDE SEVERITY RATING SCALE - C-SSRS
6. HAVE YOU EVER DONE ANYTHING, STARTED TO DO ANYTHING, OR PREPARED TO DO ANYTHING TO END YOUR LIFE?: NO
1. IN THE PAST MONTH, HAVE YOU WISHED YOU WERE DEAD OR WISHED YOU COULD GO TO SLEEP AND NOT WAKE UP?: NO
2. HAVE YOU ACTUALLY HAD ANY THOUGHTS OF KILLING YOURSELF?: NO

## 2025-02-26 ASSESSMENT — PAIN SCALES - GENERAL
PAINLEVEL_OUTOF10: 5 - MODERATE PAIN
PAINLEVEL_OUTOF10: 0 - NO PAIN

## 2025-02-26 ASSESSMENT — PAIN - FUNCTIONAL ASSESSMENT: PAIN_FUNCTIONAL_ASSESSMENT: 0-10

## 2025-02-26 NOTE — ANESTHESIA PROCEDURE NOTES
Airway  Date/Time: 2/26/2025 8:47 AM  Urgency: elective    Airway not difficult    Staffing  Performed: TOM   Authorized by: Tracy Cooper MD    Performed by: REMY Rose  Patient location during procedure: OR    Indications and Patient Condition  Indications for airway management: airway protection and anesthesia  Spontaneous Ventilation: absent  Sedation level: deep  Preoxygenated: yes  Patient position: sniffing  Mask difficulty assessment: 2 - vent by mask + OA or adjuvant +/- NMBA    Final Airway Details  Final airway type: endotracheal airway      Successful airway: ETT  Cuffed: yes   Successful intubation technique: direct laryngoscopy  Facilitating devices/methods: intubating stylet  Endotracheal tube insertion site: oral  Blade: Familia  Blade size: #3  ETT size (mm): 8.5  Cormack-Lehane Classification: grade I - full view of glottis  Placement verified by: chest auscultation and capnometry   Measured from: lips  ETT to lips (cm): 21  Number of attempts at approach: 1

## 2025-02-26 NOTE — LETTER
DearPrincess       2/14/2025                                                                                                INFORMATION FOR YOUR PROCEDURE    INSTRUCTIONS MUST BE FOLLOWED OR YOU RUN THE RISK OF YOUR CASE BEING CANCELED    Information is attached to this e-mail for your upcoming procedure. (Look for the paperclip in your email to access this information)  Lab requisitions (if needed), are also included as well as procedural instructions.       You are scheduled for your Bronchoscopy on  2/26/25 , with Dr. Jin Garcia [facility] [address] [place] [address]    07:00 AM    - CT  Scan  [facility]   2nd Floor Radiology  [address]    When finished with the CT scan,   please make your way to [facility] Room [address].  This is right around the corner from [facility].  Located on the first floor.  There are information desks there for your convenience and if you have questions.    Check In will be at   7:30 AM.  This allows us to prepare you for the actual procedure.                                        Bronchoscopy   Location:  [address] [facility]                                         Bronchoscopy / Endoscopy Suite    NPO (No food or drink) after midnight the night before your procedure. This includes coffee, water, and soda, hard candy, gum or mints.  If taking your morning medications, a small sip of water is allowed to get the medication down.    For your safety, you must have a responsible, adult  accompany you to your procedure.  You will not be permitted to drive yourself home if you have received any type of anesthesia or sedation.    Automated calls about your upcoming scheduled appointments can be quite confusing for patients.    The times may vary depending on what you have scheduled for procedure day. Follow the time/s I have given you on your information sheet so there is no confusion.  Be aware you may  receive conflicting times from different departments.      Blood work will need to be done prior to your procedure, preferably at a  Facility.  There are no restrictions for testing. Your blood work is current. No need to repeat.    Dr. Ag Escamilla, will call you on 2/24/25, @ 2:30 PM    This is a phone visit to go over your medical history prior to your procedure, and is a necessary part of your medical workup.  The patient must be present at this visit.    Please reach out to me with any questions you may have  Vilma  [phone] or Boom @ [phone]    If you have an emergency (weather or other) on the day of your scheduled procedure and need to cancel for any reason, Please call the Endoscopy Suite @ [phone],  Otherwise, call Vilma @ [phone]      Have a nice day!    Vilma Morris    Bronchoscopy   Interventional Pulmonology    MD Heather Hernandez MD Sameer Avasarala, MD Catalina Teba, MD Andrew Dunatchik, MD Sruti Brahmandam, MD      [facility]  Pulmonary, Critical Care and Sleep Medicine  [address] [place] [address]  P -[phone]  - [phone]  [email or web address]

## 2025-02-26 NOTE — INTERVAL H&P NOTE
H&P reviewed. The patient was examined and there are no changes to the H&P.  AO, RRR no m/r/g, CTAB, no edema, edentulous.  Consent reviewed proceed with bronchoscopy.

## 2025-02-26 NOTE — ANESTHESIA POSTPROCEDURE EVALUATION
"Patient: Princess Nevarez \"Edna\"    Procedure Summary       Date: 02/26/25 Room / Location: [facility]    Anesthesia Start: 0837 Anesthesia Stop: 1119    Procedure: BRONCHOSCOPY Diagnosis: Lung nodule    Scheduled Providers: Heaven RAMOS MD Responsible Provider: Tracy Cooper MD    Anesthesia Type: general ASA Status: 3            Anesthesia Type: general    Vitals Value Taken Time   /72 02/26/25 1143   Temp 36.2 °C (97.2 °F) 02/26/25 1113   Pulse 75 02/26/25 1143   Resp 18 02/26/25 1143   SpO2 95 % 02/26/25 1143       Anesthesia Post Evaluation    Patient location during evaluation: PACU  Patient participation: complete - patient participated  Level of consciousness: awake  Pain management: adequate  Airway patency: patent  Cardiovascular status: acceptable  Respiratory status: acceptable  Hydration status: acceptable  Postoperative Nausea and Vomiting: none        There were no known notable events for this encounter.    "

## 2025-02-26 NOTE — DISCHARGE INSTRUCTIONS
The anesthetics, sedatives or narcotics which were given to you today will be acting in your body for the next 24 hours, so you might feel a little sleepy or groggy. This feeling should slowly wear off.   Carefully read and follow the instructions below:   You received sedation today.   Do not drive or operate machinery or power tools of any kind.   No alcoholic beverages today, not even beer or wine.   No over the counter medications that contain alcohol or may cause drowsiness.   Do not make important decisions or sign legal documents.     Do not use Aspirin containing products or non-steroidal medications for the next 24 hours.  (Examples of these types of medications include: Advil, Aleve, Ecotrin, Ibuprofen, Motrin or Naprosyn.  This list is not all-inclusive.    Tylenol, cough medicine, cough drops or throat lozenges may be used when you are allowed to resume eating and drinking.     Call your physician if any of these symptoms occur:   High fever over 101 degrees or chills (a low grade fever is common for 24 hours)   Rash or hives   Persistent nausea or vomiting   Inability to urinate within 8 hours after the procedure    Go directly to the emergency room if you notice any of the following:   Shortness of breath   Chest pain              Coughing up large amounts of bright red blood greater than a teaspoonful of blood clots (about a teaspoonful for the next 24-48 hours is normal, especially if you had a biopsy)    Resume all normal medications unless directed otherwise by your doctor.     Your doctor recommends these additional instructions:    Follow up with your referring physician as previously scheduled.    If you experience any problems or have any questions following discharge, please call:   Before 5 pm: [phone]   After 5pm and on weekends: [phone] / [phone] and ask for the Pulmonary Fellow on-call (Pager Number: [phone])

## 2025-02-26 NOTE — ANESTHESIA PREPROCEDURE EVALUATION
"Patient: Princess Nevarez \"Edna\"    Procedure Information       Date/Time: 02/26/25 0830    Scheduled providers: Heaven RAMOS MD    Procedure: BRONCHOSCOPY    Location: [facility]            Relevant Problems   Anesthesia  No family hx MH   (+) PONV (postoperative nausea and vomiting)      Cardiac   (+) Benign essential hypertension      Pulmonary   (+) COPD exacerbation (Multi)      Neuro   (+) CON (generalized anxiety disorder)      GI   (+) GERD (gastroesophageal reflux disease)      Respiratory   (+) Lung nodule       Clinical information reviewed:                   NPO Detail:  No data recorded     Physical Exam    Airway  Mallampati: I  TM distance: >3 FB  Neck ROM: full     Cardiovascular - normal exam     Dental   (+) upper dentures, lower dentures  Comments: Denture home   Pulmonary - normal exam     Abdominal            Anesthesia Plan    History of general anesthesia?: yes  History of complications of general anesthesia?: no    ASA 3     general     intravenous induction   Trial extubation is planned.  Anesthetic plan and risks discussed with patient.  Use of blood products discussed with spouse who consented to blood products.    Plan discussed with [facility]AA.      "

## 2025-02-27 ENCOUNTER — PATIENT OUTREACH (OUTPATIENT)
Dept: PRIMARY CARE | Facility: CLINIC | Age: 65
End: 2025-02-27
Payer: MEDICARE

## 2025-02-27 DIAGNOSIS — R91.1 LUNG NODULE: ICD-10-CM

## 2025-02-27 NOTE — PROGRESS NOTES
Unable to reach patient for one month post discharge follow up call.                LVM with call back number for patient to call if needed     If no voicemail available call attempts x 2 were made to contact the patient to    assist with  any questions or concerns patient may have.

## 2025-02-27 NOTE — ADDENDUM NOTE
Encounter addended by: Pablito Neves RN on: 2/27/2025 9:20 AM   Actions taken: Contacts section saved, Flowsheet accepted

## 2025-02-28 ENCOUNTER — TELEPHONE (OUTPATIENT)
Dept: CARDIOTHORACIC SURGERY | Facility: HOSPITAL | Age: 65
End: 2025-02-28
Payer: MEDICARE

## 2025-02-28 NOTE — TELEPHONE ENCOUNTER
Spoke with patient at length regarding Dr. Dos Santos's request for patient to have repeat ABGs-PO2. Patient states that someone contacted her to schedule the ABG testing. Patient understands that the repeat ABG testing is to check PO2 results and compare to Jan 2025 results. Patient to contact Dr. Dos Santos's office if difficulties with scheduling ABG testing.

## 2025-03-03 ENCOUNTER — APPOINTMENT (OUTPATIENT)
Dept: RESPIRATORY THERAPY | Facility: HOSPITAL | Age: 65
End: 2025-03-03
Payer: MEDICARE

## 2025-03-03 ENCOUNTER — TELEPHONE (OUTPATIENT)
Dept: PRIMARY CARE | Facility: CLINIC | Age: 65
End: 2025-03-03
Payer: MEDICARE

## 2025-03-03 LAB
LAB AP ASR DISCLAIMER: NORMAL
LAB AP ASR DISCLAIMER: NORMAL
LABORATORY COMMENT REPORT: NORMAL
PATH REPORT.COMMENTS IMP SPEC: NORMAL
PATH REPORT.FINAL DX SPEC: NORMAL
PATH REPORT.FINAL DX SPEC: NORMAL
PATH REPORT.GROSS SPEC: NORMAL
PATH REPORT.GROSS SPEC: NORMAL
PATH REPORT.INTRAOP OBS SPEC DOC: NORMAL
PATH REPORT.RELEVANT HX SPEC: NORMAL
PATH REPORT.TOTAL CANCER: NORMAL
PATH REPORT.TOTAL CANCER: NORMAL

## 2025-03-03 NOTE — TELEPHONE ENCOUNTER
----- Message from Frances Valentin sent at 3/3/2025  4:54 PM EST -----  A.  LUNG, RIGHT UPPER LOBE, CORE BIOPSY:  - Fragments of poorly differentiated non-small cell carcinoma, favor adenocarcinoma, see note.  - See concurrent cytology specimen ([id number]).     Note: Tumor cells are positive for CK7 and TTF-1 (8G7G3). One rare cluster of cells co-expresses p40.  The morphologic and immunohistochemical findings support the above diagnosis.  PD-L1 immunostain results are reported below. Molecular studies will be performed on the concurrent cytology specimen.     Tumor Block:  A1  Preliminary Assessment of Specimen Adequacy for Ancillary Testing:  Adequate   Viable tumor nuclei: 25%     : Dr. Moe Spear.         Follow-up in office to discuss option I recommend patient to seen by [facility] Dr. Pollard for further care

## 2025-03-05 ENCOUNTER — HOSPITAL ENCOUNTER (OUTPATIENT)
Dept: RESPIRATORY THERAPY | Facility: HOSPITAL | Age: 65
Discharge: HOME | End: 2025-03-05
Payer: MEDICARE

## 2025-03-05 DIAGNOSIS — R91.1 LUNG NODULE: ICD-10-CM

## 2025-03-05 LAB
ARTERIAL PATENCY WRIST A: ABNORMAL
BASE EXCESS BLDA CALC-SCNC: 0.3 MMOL/L (ref -2–3)
BODY TEMPERATURE: 37 DEGREES CELSIUS
COHGB MFR BLDA: 1.6 %
DO-HGB MFR BLDA: 1.5 % (ref 0–5)
HCO3 BLDA-SCNC: 23.3 MMOL/L (ref 22–26)
HGB BLDA-MCNC: 12.9 G/DL (ref 12–16)
INHALED O2 CONCENTRATION: 21 %
METHGB MFR BLDA: 1.1 % (ref 0–1.5)
OXYHGB MFR BLDA: 95.8 % (ref 94–98)
PCO2 BLDA: 32 MM HG (ref 38–42)
PH BLDA: 7.47 PH (ref 7.38–7.42)
PO2 BLDA: 89 MM HG (ref 85–95)
SAO2 % BLDA: 99 % (ref 94–100)
SITE OF ARTERIAL PUNCTURE: ABNORMAL

## 2025-03-05 PROCEDURE — 82375 ASSAY CARBOXYHB QUANT: CPT

## 2025-03-05 PROCEDURE — 36600 WITHDRAWAL OF ARTERIAL BLOOD: CPT

## 2025-03-11 ENCOUNTER — PREP FOR PROCEDURE (OUTPATIENT)
Dept: CARDIOTHORACIC SURGERY | Facility: HOSPITAL | Age: 65
End: 2025-03-11
Payer: MEDICARE

## 2025-03-11 DIAGNOSIS — C34.11 MALIGNANT NEOPLASM OF UPPER LOBE OF RIGHT LUNG (MULTI): Primary | ICD-10-CM

## 2025-03-12 DIAGNOSIS — F17.210 CIGARETTE SMOKER: ICD-10-CM

## 2025-03-13 RX ORDER — ALBUTEROL SULFATE 90 UG/1
INHALANT RESPIRATORY (INHALATION)
Qty: 8.5 G | Refills: 3 | Status: SHIPPED | OUTPATIENT
Start: 2025-03-13

## 2025-03-14 LAB
ELECTRONICALLY SIGNED BY: NORMAL
FOCUSED SOLID TUMOR DNA/RNA RESULTS: NORMAL

## 2025-03-14 PROCEDURE — G0452 MOLECULAR PATHOLOGY INTERPR: HCPCS | Performed by: INTERNAL MEDICINE

## 2025-03-14 PROCEDURE — 81458 SO GSAP DNA CPY NMBR&MCRSTL: CPT | Performed by: INTERNAL MEDICINE

## 2025-03-17 ENCOUNTER — TELEPHONE (OUTPATIENT)
Dept: PRIMARY CARE | Facility: CLINIC | Age: 65
End: 2025-03-17
Payer: MEDICARE

## 2025-03-17 NOTE — TELEPHONE ENCOUNTER
----- Message from Frances Valentin sent at 3/17/2025 11:09 AM EDT -----  Specimen: Supernatant, [id number] E1  Estimated Tumor Content: 40%     MICROSATELLITE STATUS: Microsatellite Instability-High (MSI-H) is NOT DETECTED.        DISEASE ASSOCIATED GENOMIC FINDINGS:   KEAP1 p.G417W ([id number] c.1248_1249delinsTT)  KRAS p.G12C ([id number] c.34G>T)  TP53 p.R249M ([id number] c.746G>T)  TP53 p.R158L ([id number] c.473G>T)     INTERPRETATION AND POTENTIAL THERAPEUTIC OPTIONS:  KRAS p.G12C  FDA RECOMMENDATIONS (Advanced Non Small Cell Lung Cancer):  adagrasib (Subsequent)  sotorasib (Subsequent)     Follow-up with Dr. Andujar for oncology dr rea

## 2025-03-21 NOTE — PROGRESS NOTES
"Pharmacy Medication History Review    Princess Nevarez \"Edna\" is a 64 y.o. female who is planned to be admitted for Malignant neoplasm of upper lobe of right lung (Multi). Pharmacy called the patient prior to their scheduled procedure and reviewed the patient's [id number] medications for accuracy.    Medications ADDED:  none  Medications CHANGED:  Metoprolol succinate 25mg from \"not taking\" to IS taking  Medications REMOVED:   Amlodipine 5mg    Please review updated prior to admission medication list and comments regarding how patient may be taking medications differently by going to Admission tab --> Admission Orders --> Admit Orders / Review prior to admission medications.     Preferred pharmacy, last doses of medications, and allergies to be confirmed with patient by nursing the day of procedure.     Sources used to complete the med history include:  [facility]  Pharmacy dispense history  Patient Interview Good historian  Chart Review  Care Everywhere     Below are additional concerns with the patient's PTA list.  Patient states they stopped amlodipine 5mg due to blood pressure not staying controlled. States doctor started metoprolol succinate 25mg for better control of their blood pressure due to being extended release. Metoprolol succinate 25mg L.F. 02/25/25 #90/90d. (Amlodipine 5mg L.F. 01/10/25 #30/30d)  Patient states they use their trelegy inhaler and duo-neb every day. Albuterol inhaler as needed    Delmi Verdin Eunice  Please reach out via Secure Chat for questions   "

## 2025-03-23 ENCOUNTER — ANESTHESIA EVENT (OUTPATIENT)
Dept: OPERATING ROOM | Facility: HOSPITAL | Age: 65
End: 2025-03-23
Payer: MEDICARE

## 2025-03-24 ENCOUNTER — APPOINTMENT (OUTPATIENT)
Dept: RADIOLOGY | Facility: HOSPITAL | Age: 65
End: 2025-03-24
Payer: MEDICARE

## 2025-03-24 ENCOUNTER — HOSPITAL ENCOUNTER (INPATIENT)
Facility: HOSPITAL | Age: 65
LOS: 3 days | Discharge: HOME HEALTH | End: 2025-03-27
Attending: THORACIC SURGERY (CARDIOTHORACIC VASCULAR SURGERY) | Admitting: THORACIC SURGERY (CARDIOTHORACIC VASCULAR SURGERY)
Payer: MEDICARE

## 2025-03-24 ENCOUNTER — APPOINTMENT (OUTPATIENT)
Dept: CARDIOLOGY | Facility: HOSPITAL | Age: 65
End: 2025-03-24
Payer: MEDICARE

## 2025-03-24 ENCOUNTER — ANESTHESIA (OUTPATIENT)
Dept: OPERATING ROOM | Facility: HOSPITAL | Age: 65
End: 2025-03-24
Payer: MEDICARE

## 2025-03-24 DIAGNOSIS — C34.11 MALIGNANT NEOPLASM OF UPPER LOBE OF RIGHT LUNG (MULTI): Primary | ICD-10-CM

## 2025-03-24 DIAGNOSIS — I10 BENIGN ESSENTIAL HYPERTENSION: ICD-10-CM

## 2025-03-24 LAB
ABO GROUP (TYPE) IN BLOOD: NORMAL
ABO GROUP (TYPE) IN BLOOD: NORMAL
ANION GAP BLDA CALCULATED.4IONS-SCNC: 14 MMO/L (ref 10–25)
ANTIBODY SCREEN: NORMAL
BASE EXCESS BLDA CALC-SCNC: -4.7 MMOL/L (ref -2–3)
BODY TEMPERATURE: 37 DEGREES CELSIUS
CA-I BLDA-SCNC: 1.18 MMOL/L (ref 1.1–1.33)
CHLORIDE BLDA-SCNC: 104 MMOL/L (ref 98–107)
GLUCOSE BLDA-MCNC: 154 MG/DL (ref 74–99)
HCO3 BLDA-SCNC: 25 MMOL/L (ref 22–26)
HCT VFR BLD EST: 41 % (ref 36–46)
HGB BLDA-MCNC: 13.7 G/DL (ref 12–16)
INHALED O2 CONCENTRATION: 100 %
LACTATE BLDA-SCNC: 0.9 MMOL/L (ref 0.4–2)
OXYHGB MFR BLDA: 97.5 % (ref 94–98)
PCO2 BLDA: 67 MM HG (ref 38–42)
PH BLDA: 7.18 PH (ref 7.38–7.42)
PO2 BLDA: 129 MM HG (ref 85–95)
POTASSIUM BLDA-SCNC: 4 MMOL/L (ref 3.5–5.3)
RH FACTOR (ANTIGEN D): NORMAL
RH FACTOR (ANTIGEN D): NORMAL
SAO2 % BLDA: 99 % (ref 94–100)
SODIUM BLDA-SCNC: 139 MMOL/L (ref 136–145)

## 2025-03-24 PROCEDURE — 3700000002 HC GENERAL ANESTHESIA TIME - EACH INCREMENTAL 1 MINUTE: Performed by: THORACIC SURGERY (CARDIOTHORACIC VASCULAR SURGERY)

## 2025-03-24 PROCEDURE — 2500000004 HC RX 250 GENERAL PHARMACY W/ HCPCS (ALT 636 FOR OP/ED): Mod: JZ,TB | Performed by: PHYSICIAN ASSISTANT

## 2025-03-24 PROCEDURE — 84295 ASSAY OF SERUM SODIUM: CPT | Performed by: STUDENT IN AN ORGANIZED HEALTH CARE EDUCATION/TRAINING PROGRAM

## 2025-03-24 PROCEDURE — 96372 THER/PROPH/DIAG INJ SC/IM: CPT | Performed by: STUDENT IN AN ORGANIZED HEALTH CARE EDUCATION/TRAINING PROGRAM

## 2025-03-24 PROCEDURE — 88313 SPECIAL STAINS GROUP 2: CPT | Performed by: STUDENT IN AN ORGANIZED HEALTH CARE EDUCATION/TRAINING PROGRAM

## 2025-03-24 PROCEDURE — 84132 ASSAY OF SERUM POTASSIUM: CPT | Performed by: STUDENT IN AN ORGANIZED HEALTH CARE EDUCATION/TRAINING PROGRAM

## 2025-03-24 PROCEDURE — 36620 INSERTION CATHETER ARTERY: CPT | Performed by: STUDENT IN AN ORGANIZED HEALTH CARE EDUCATION/TRAINING PROGRAM

## 2025-03-24 PROCEDURE — 3600000017 HC OR TIME - EACH INCREMENTAL 1 MINUTE - PROCEDURE LEVEL SIX: Performed by: THORACIC SURGERY (CARDIOTHORACIC VASCULAR SURGERY)

## 2025-03-24 PROCEDURE — 8E0W4CZ ROBOTIC ASSISTED PROCEDURE OF TRUNK REGION, PERCUTANEOUS ENDOSCOPIC APPROACH: ICD-10-PCS | Performed by: THORACIC SURGERY (CARDIOTHORACIC VASCULAR SURGERY)

## 2025-03-24 PROCEDURE — 88305 TISSUE EXAM BY PATHOLOGIST: CPT | Mod: TC,SUR | Performed by: THORACIC SURGERY (CARDIOTHORACIC VASCULAR SURGERY)

## 2025-03-24 PROCEDURE — 2500000005 HC RX 250 GENERAL PHARMACY W/O HCPCS: Performed by: STUDENT IN AN ORGANIZED HEALTH CARE EDUCATION/TRAINING PROGRAM

## 2025-03-24 PROCEDURE — 0BTC4ZZ RESECTION OF RIGHT UPPER LUNG LOBE, PERCUTANEOUS ENDOSCOPIC APPROACH: ICD-10-PCS | Performed by: THORACIC SURGERY (CARDIOTHORACIC VASCULAR SURGERY)

## 2025-03-24 PROCEDURE — 7100000001 HC RECOVERY ROOM TIME - INITIAL BASE CHARGE: Performed by: THORACIC SURGERY (CARDIOTHORACIC VASCULAR SURGERY)

## 2025-03-24 PROCEDURE — S0109 METHADONE ORAL 5MG: HCPCS | Performed by: STUDENT IN AN ORGANIZED HEALTH CARE EDUCATION/TRAINING PROGRAM

## 2025-03-24 PROCEDURE — 7100000002 HC RECOVERY ROOM TIME - EACH INCREMENTAL 1 MINUTE: Performed by: THORACIC SURGERY (CARDIOTHORACIC VASCULAR SURGERY)

## 2025-03-24 PROCEDURE — 32663 THORACOSCOPY W/LOBECTOMY: CPT | Performed by: THORACIC SURGERY (CARDIOTHORACIC VASCULAR SURGERY)

## 2025-03-24 PROCEDURE — 2500000004 HC RX 250 GENERAL PHARMACY W/ HCPCS (ALT 636 FOR OP/ED): Performed by: THORACIC SURGERY (CARDIOTHORACIC VASCULAR SURGERY)

## 2025-03-24 PROCEDURE — 37799 UNLISTED PX VASCULAR SURGERY: CPT | Performed by: STUDENT IN AN ORGANIZED HEALTH CARE EDUCATION/TRAINING PROGRAM

## 2025-03-24 PROCEDURE — 3600000018 HC OR TIME - INITIAL BASE CHARGE - PROCEDURE LEVEL SIX: Performed by: THORACIC SURGERY (CARDIOTHORACIC VASCULAR SURGERY)

## 2025-03-24 PROCEDURE — A32663 PR THORACOSCOPY SURG LOBECTOMY: Performed by: STUDENT IN AN ORGANIZED HEALTH CARE EDUCATION/TRAINING PROGRAM

## 2025-03-24 PROCEDURE — 3700000001 HC GENERAL ANESTHESIA TIME - INITIAL BASE CHARGE: Performed by: THORACIC SURGERY (CARDIOTHORACIC VASCULAR SURGERY)

## 2025-03-24 PROCEDURE — 86900 BLOOD TYPING SEROLOGIC ABO: CPT | Performed by: THORACIC SURGERY (CARDIOTHORACIC VASCULAR SURGERY)

## 2025-03-24 PROCEDURE — 2500000004 HC RX 250 GENERAL PHARMACY W/ HCPCS (ALT 636 FOR OP/ED)

## 2025-03-24 PROCEDURE — 2500000001 HC RX 250 WO HCPCS SELF ADMINISTERED DRUGS (ALT 637 FOR MEDICARE OP): Performed by: STUDENT IN AN ORGANIZED HEALTH CARE EDUCATION/TRAINING PROGRAM

## 2025-03-24 PROCEDURE — 71045 X-RAY EXAM CHEST 1 VIEW: CPT

## 2025-03-24 PROCEDURE — 0BJ08ZZ INSPECTION OF TRACHEOBRONCHIAL TREE, VIA NATURAL OR ARTIFICIAL OPENING ENDOSCOPIC: ICD-10-PCS | Performed by: THORACIC SURGERY (CARDIOTHORACIC VASCULAR SURGERY)

## 2025-03-24 PROCEDURE — 88305 TISSUE EXAM BY PATHOLOGIST: CPT | Performed by: STUDENT IN AN ORGANIZED HEALTH CARE EDUCATION/TRAINING PROGRAM

## 2025-03-24 PROCEDURE — 2500000004 HC RX 250 GENERAL PHARMACY W/ HCPCS (ALT 636 FOR OP/ED): Mod: JZ,TB | Performed by: STUDENT IN AN ORGANIZED HEALTH CARE EDUCATION/TRAINING PROGRAM

## 2025-03-24 PROCEDURE — 36415 COLL VENOUS BLD VENIPUNCTURE: CPT | Performed by: THORACIC SURGERY (CARDIOTHORACIC VASCULAR SURGERY)

## 2025-03-24 PROCEDURE — 88309 TISSUE EXAM BY PATHOLOGIST: CPT | Performed by: STUDENT IN AN ORGANIZED HEALTH CARE EDUCATION/TRAINING PROGRAM

## 2025-03-24 PROCEDURE — 2500000002 HC RX 250 W HCPCS SELF ADMINISTERED DRUGS (ALT 637 FOR MEDICARE OP, ALT 636 FOR OP/ED): Performed by: STUDENT IN AN ORGANIZED HEALTH CARE EDUCATION/TRAINING PROGRAM

## 2025-03-24 PROCEDURE — 1200000002 HC GENERAL ROOM WITH TELEMETRY DAILY

## 2025-03-24 PROCEDURE — 2500000004 HC RX 250 GENERAL PHARMACY W/ HCPCS (ALT 636 FOR OP/ED): Performed by: STUDENT IN AN ORGANIZED HEALTH CARE EDUCATION/TRAINING PROGRAM

## 2025-03-24 PROCEDURE — 88342 IMHCHEM/IMCYTCHM 1ST ANTB: CPT | Performed by: STUDENT IN AN ORGANIZED HEALTH CARE EDUCATION/TRAINING PROGRAM

## 2025-03-24 PROCEDURE — 32674 THORACOSCOPY LYMPH NODE EXC: CPT | Performed by: PHYSICIAN ASSISTANT

## 2025-03-24 PROCEDURE — 32674 THORACOSCOPY LYMPH NODE EXC: CPT | Performed by: THORACIC SURGERY (CARDIOTHORACIC VASCULAR SURGERY)

## 2025-03-24 PROCEDURE — 93005 ELECTROCARDIOGRAM TRACING: CPT

## 2025-03-24 PROCEDURE — 32663 THORACOSCOPY W/LOBECTOMY: CPT | Performed by: PHYSICIAN ASSISTANT

## 2025-03-24 PROCEDURE — 2720000007 HC OR 272 NO HCPCS: Performed by: THORACIC SURGERY (CARDIOTHORACIC VASCULAR SURGERY)

## 2025-03-24 PROCEDURE — 2500000001 HC RX 250 WO HCPCS SELF ADMINISTERED DRUGS (ALT 637 FOR MEDICARE OP): Performed by: PHYSICIAN ASSISTANT

## 2025-03-24 PROCEDURE — 07T74ZZ RESECTION OF THORAX LYMPHATIC, PERCUTANEOUS ENDOSCOPIC APPROACH: ICD-10-PCS | Performed by: THORACIC SURGERY (CARDIOTHORACIC VASCULAR SURGERY)

## 2025-03-24 PROCEDURE — 88341 IMHCHEM/IMCYTCHM EA ADD ANTB: CPT | Performed by: STUDENT IN AN ORGANIZED HEALTH CARE EDUCATION/TRAINING PROGRAM

## 2025-03-24 RX ORDER — HYDROMORPHONE HYDROCHLORIDE 0.2 MG/ML
0.2 INJECTION INTRAMUSCULAR; INTRAVENOUS; SUBCUTANEOUS EVERY 5 MIN PRN
Status: DISCONTINUED | OUTPATIENT
Start: 2025-03-24 | End: 2025-03-24 | Stop reason: HOSPADM

## 2025-03-24 RX ORDER — HYDROMORPHONE HCL/0.9% NACL/PF 15 MG/30ML
PATIENT CONTROLLED ANALGESIA SYRINGE INTRAVENOUS CONTINUOUS
Status: DISCONTINUED | OUTPATIENT
Start: 2025-03-24 | End: 2025-03-25

## 2025-03-24 RX ORDER — ALBUTEROL SULFATE 0.83 MG/ML
2.5 SOLUTION RESPIRATORY (INHALATION) ONCE AS NEEDED
Status: DISCONTINUED | OUTPATIENT
Start: 2025-03-24 | End: 2025-03-24 | Stop reason: HOSPADM

## 2025-03-24 RX ORDER — PROPOFOL 10 MG/ML
INJECTION, EMULSION INTRAVENOUS AS NEEDED
Status: DISCONTINUED | OUTPATIENT
Start: 2025-03-24 | End: 2025-03-24

## 2025-03-24 RX ORDER — OXYCODONE AND ACETAMINOPHEN 5; 325 MG/1; MG/1
1 TABLET ORAL EVERY 4 HOURS PRN
Status: DISCONTINUED | OUTPATIENT
Start: 2025-03-24 | End: 2025-03-24 | Stop reason: HOSPADM

## 2025-03-24 RX ORDER — BUPIVACAINE HCL/EPINEPHRINE 0.25-.0005
VIAL (ML) INJECTION AS NEEDED
Status: DISCONTINUED | OUTPATIENT
Start: 2025-03-24 | End: 2025-03-24 | Stop reason: HOSPADM

## 2025-03-24 RX ORDER — CEFAZOLIN 1 G/1
INJECTION, POWDER, FOR SOLUTION INTRAVENOUS AS NEEDED
Status: DISCONTINUED | OUTPATIENT
Start: 2025-03-24 | End: 2025-03-24

## 2025-03-24 RX ORDER — CEFAZOLIN SODIUM 2 G/100ML
2 INJECTION, SOLUTION INTRAVENOUS EVERY 8 HOURS
Status: COMPLETED | OUTPATIENT
Start: 2025-03-25 | End: 2025-03-25

## 2025-03-24 RX ORDER — IPRATROPIUM BROMIDE AND ALBUTEROL SULFATE 2.5; .5 MG/3ML; MG/3ML
3 SOLUTION RESPIRATORY (INHALATION) EVERY 6 HOURS PRN
Status: DISCONTINUED | OUTPATIENT
Start: 2025-03-24 | End: 2025-03-27 | Stop reason: HOSPADM

## 2025-03-24 RX ORDER — HEPARIN SODIUM 5000 [USP'U]/ML
INJECTION, SOLUTION INTRAVENOUS; SUBCUTANEOUS AS NEEDED
Status: DISCONTINUED | OUTPATIENT
Start: 2025-03-24 | End: 2025-03-24

## 2025-03-24 RX ORDER — AMOXICILLIN 250 MG
2 CAPSULE ORAL 2 TIMES DAILY
Status: DISCONTINUED | OUTPATIENT
Start: 2025-03-24 | End: 2025-03-27 | Stop reason: HOSPADM

## 2025-03-24 RX ORDER — ONDANSETRON HYDROCHLORIDE 2 MG/ML
4 INJECTION, SOLUTION INTRAVENOUS ONCE AS NEEDED
Status: COMPLETED | OUTPATIENT
Start: 2025-03-24 | End: 2025-03-24

## 2025-03-24 RX ORDER — MAGNESIUM SULFATE HEPTAHYDRATE 500 MG/ML
INJECTION, SOLUTION INTRAMUSCULAR; INTRAVENOUS AS NEEDED
Status: DISCONTINUED | OUTPATIENT
Start: 2025-03-24 | End: 2025-03-24

## 2025-03-24 RX ORDER — APREPITANT 40 MG/1
CAPSULE ORAL AS NEEDED
Status: DISCONTINUED | OUTPATIENT
Start: 2025-03-24 | End: 2025-03-24

## 2025-03-24 RX ORDER — DROPERIDOL 2.5 MG/ML
0.62 INJECTION, SOLUTION INTRAMUSCULAR; INTRAVENOUS ONCE AS NEEDED
Status: DISCONTINUED | OUTPATIENT
Start: 2025-03-24 | End: 2025-03-24 | Stop reason: HOSPADM

## 2025-03-24 RX ORDER — FENTANYL CITRATE 50 UG/ML
INJECTION, SOLUTION INTRAMUSCULAR; INTRAVENOUS AS NEEDED
Status: DISCONTINUED | OUTPATIENT
Start: 2025-03-24 | End: 2025-03-24

## 2025-03-24 RX ORDER — ROCURONIUM BROMIDE 10 MG/ML
INJECTION, SOLUTION INTRAVENOUS AS NEEDED
Status: DISCONTINUED | OUTPATIENT
Start: 2025-03-24 | End: 2025-03-24

## 2025-03-24 RX ORDER — METHADONE HYDROCHLORIDE 10 MG/1
TABLET ORAL AS NEEDED
Status: DISCONTINUED | OUTPATIENT
Start: 2025-03-24 | End: 2025-03-24

## 2025-03-24 RX ORDER — LIDOCAINE HYDROCHLORIDE 20 MG/ML
INJECTION, SOLUTION INFILTRATION; PERINEURAL AS NEEDED
Status: DISCONTINUED | OUTPATIENT
Start: 2025-03-24 | End: 2025-03-24

## 2025-03-24 RX ORDER — FLUTICASONE FUROATE AND VILANTEROL 100; 25 UG/1; UG/1
1 POWDER RESPIRATORY (INHALATION)
Status: DISCONTINUED | OUTPATIENT
Start: 2025-03-25 | End: 2025-03-27 | Stop reason: HOSPADM

## 2025-03-24 RX ORDER — ONDANSETRON HYDROCHLORIDE 2 MG/ML
INJECTION, SOLUTION INTRAVENOUS AS NEEDED
Status: DISCONTINUED | OUTPATIENT
Start: 2025-03-24 | End: 2025-03-24

## 2025-03-24 RX ORDER — ALBUTEROL SULFATE 90 UG/1
INHALANT RESPIRATORY (INHALATION) AS NEEDED
Status: DISCONTINUED | OUTPATIENT
Start: 2025-03-24 | End: 2025-03-24

## 2025-03-24 RX ORDER — LIDOCAINE HYDROCHLORIDE 10 MG/ML
0.1 INJECTION, SOLUTION INFILTRATION; PERINEURAL ONCE
Status: DISCONTINUED | OUTPATIENT
Start: 2025-03-24 | End: 2025-03-24 | Stop reason: HOSPADM

## 2025-03-24 RX ORDER — SODIUM CHLORIDE, SODIUM LACTATE, POTASSIUM CHLORIDE, CALCIUM CHLORIDE 600; 310; 30; 20 MG/100ML; MG/100ML; MG/100ML; MG/100ML
50 INJECTION, SOLUTION INTRAVENOUS CONTINUOUS
Status: ACTIVE | OUTPATIENT
Start: 2025-03-24 | End: 2025-03-24

## 2025-03-24 RX ORDER — PHENYLEPHRINE HCL IN 0.9% NACL 0.4MG/10ML
SYRINGE (ML) INTRAVENOUS AS NEEDED
Status: DISCONTINUED | OUTPATIENT
Start: 2025-03-24 | End: 2025-03-24

## 2025-03-24 RX ORDER — NALOXONE HYDROCHLORIDE 0.4 MG/ML
0.2 INJECTION, SOLUTION INTRAMUSCULAR; INTRAVENOUS; SUBCUTANEOUS AS NEEDED
Status: DISCONTINUED | OUTPATIENT
Start: 2025-03-24 | End: 2025-03-27 | Stop reason: HOSPADM

## 2025-03-24 RX ORDER — ACETAMINOPHEN 500 MG
5 TABLET ORAL NIGHTLY PRN
Status: DISCONTINUED | OUTPATIENT
Start: 2025-03-24 | End: 2025-03-27 | Stop reason: HOSPADM

## 2025-03-24 RX ORDER — MIDAZOLAM HYDROCHLORIDE 1 MG/ML
INJECTION INTRAMUSCULAR; INTRAVENOUS AS NEEDED
Status: DISCONTINUED | OUTPATIENT
Start: 2025-03-24 | End: 2025-03-24

## 2025-03-24 RX ORDER — PAROXETINE 10 MG/1
10 TABLET, FILM COATED ORAL NIGHTLY
Status: DISCONTINUED | OUTPATIENT
Start: 2025-03-24 | End: 2025-03-27 | Stop reason: HOSPADM

## 2025-03-24 RX ORDER — PANTOPRAZOLE SODIUM 40 MG/1
40 TABLET, DELAYED RELEASE ORAL DAILY
Status: DISCONTINUED | OUTPATIENT
Start: 2025-03-25 | End: 2025-03-27 | Stop reason: HOSPADM

## 2025-03-24 RX ORDER — ACETAMINOPHEN 325 MG/1
650 TABLET ORAL EVERY 6 HOURS
Status: DISCONTINUED | OUTPATIENT
Start: 2025-03-24 | End: 2025-03-27 | Stop reason: HOSPADM

## 2025-03-24 RX ORDER — FENTANYL CITRATE 50 UG/ML
12.5 INJECTION, SOLUTION INTRAMUSCULAR; INTRAVENOUS EVERY 5 MIN PRN
Status: DISCONTINUED | OUTPATIENT
Start: 2025-03-24 | End: 2025-03-24 | Stop reason: HOSPADM

## 2025-03-24 RX ORDER — ONDANSETRON HYDROCHLORIDE 2 MG/ML
4 INJECTION, SOLUTION INTRAVENOUS EVERY 8 HOURS PRN
Status: DISCONTINUED | OUTPATIENT
Start: 2025-03-24 | End: 2025-03-27 | Stop reason: HOSPADM

## 2025-03-24 RX ORDER — PHENYLEPHRINE 10 MG/250 ML(40 MCG/ML)IN 0.9 % SOD.CHLORIDE INTRAVENOUS
CONTINUOUS PRN
Status: DISCONTINUED | OUTPATIENT
Start: 2025-03-24 | End: 2025-03-24

## 2025-03-24 RX ORDER — METOPROLOL TARTRATE 25 MG/1
25 TABLET, FILM COATED ORAL EVERY 8 HOURS SCHEDULED
Status: DISCONTINUED | OUTPATIENT
Start: 2025-03-24 | End: 2025-03-25

## 2025-03-24 RX ORDER — ESMOLOL HYDROCHLORIDE 10 MG/ML
INJECTION INTRAVENOUS AS NEEDED
Status: DISCONTINUED | OUTPATIENT
Start: 2025-03-24 | End: 2025-03-24

## 2025-03-24 RX ORDER — KETAMINE HYDROCHLORIDE 10 MG/ML
INJECTION, SOLUTION INTRAMUSCULAR; INTRAVENOUS AS NEEDED
Status: DISCONTINUED | OUTPATIENT
Start: 2025-03-24 | End: 2025-03-24

## 2025-03-24 RX ORDER — DEXMEDETOMIDINE HYDROCHLORIDE 4 UG/ML
INJECTION, SOLUTION INTRAVENOUS CONTINUOUS PRN
Status: DISCONTINUED | OUTPATIENT
Start: 2025-03-24 | End: 2025-03-24

## 2025-03-24 RX ORDER — ZOLPIDEM TARTRATE 5 MG/1
10 TABLET ORAL NIGHTLY PRN
Status: DISCONTINUED | OUTPATIENT
Start: 2025-03-24 | End: 2025-03-27 | Stop reason: HOSPADM

## 2025-03-24 RX ORDER — LABETALOL HYDROCHLORIDE 5 MG/ML
5 INJECTION, SOLUTION INTRAVENOUS ONCE AS NEEDED
Status: DISCONTINUED | OUTPATIENT
Start: 2025-03-24 | End: 2025-03-24 | Stop reason: HOSPADM

## 2025-03-24 RX ORDER — ONDANSETRON 4 MG/1
4 TABLET, FILM COATED ORAL EVERY 8 HOURS PRN
Status: DISCONTINUED | OUTPATIENT
Start: 2025-03-24 | End: 2025-03-25

## 2025-03-24 RX ADMIN — Medication 10 MG: at 15:42

## 2025-03-24 RX ADMIN — ROCURONIUM 80 MG: 50 INJECTION, SOLUTION INTRAVENOUS at 15:03

## 2025-03-24 RX ADMIN — ACETAMINOPHEN 650 MG: 325 TABLET, FILM COATED ORAL at 22:55

## 2025-03-24 RX ADMIN — ALBUTEROL SULFATE 4 PUFF: 90 AEROSOL, METERED RESPIRATORY (INHALATION) at 14:26

## 2025-03-24 RX ADMIN — SENNOSIDES AND DOCUSATE SODIUM 2 TABLET: 50; 8.6 TABLET ORAL at 22:55

## 2025-03-24 RX ADMIN — METHADONE HYDROCHLORIDE 10 MG: 10 TABLET ORAL at 14:25

## 2025-03-24 RX ADMIN — DEXMEDETOMIDINE HYDROCHLORIDE 0.4 MCG/KG/HR: 4 INJECTION, SOLUTION INTRAVENOUS at 15:35

## 2025-03-24 RX ADMIN — PROPOFOL 50 MG: 10 INJECTION, EMULSION INTRAVENOUS at 15:06

## 2025-03-24 RX ADMIN — SODIUM CHLORIDE, SODIUM LACTATE, POTASSIUM CHLORIDE, AND CALCIUM CHLORIDE: 600; 310; 30; 20 INJECTION, SOLUTION INTRAVENOUS at 15:05

## 2025-03-24 RX ADMIN — Medication 10 MG: at 17:47

## 2025-03-24 RX ADMIN — PROPOFOL 30 MG: 10 INJECTION, EMULSION INTRAVENOUS at 15:16

## 2025-03-24 RX ADMIN — CEFAZOLIN 2 G: 1 INJECTION, POWDER, FOR SOLUTION INTRAMUSCULAR; INTRAVENOUS at 15:15

## 2025-03-24 RX ADMIN — SUGAMMADEX 200 MG: 100 INJECTION, SOLUTION INTRAVENOUS at 19:32

## 2025-03-24 RX ADMIN — APREPITANT 40 MG: 40 CAPSULE ORAL at 14:25

## 2025-03-24 RX ADMIN — HYDROMORPHONE HYDROCHLORIDE: 10 INJECTION, SOLUTION INTRAMUSCULAR; INTRAVENOUS; SUBCUTANEOUS at 21:12

## 2025-03-24 RX ADMIN — HYDROMORPHONE HYDROCHLORIDE 0.5 MG: 0.5 INJECTION, SOLUTION INTRAMUSCULAR; INTRAVENOUS; SUBCUTANEOUS at 19:53

## 2025-03-24 RX ADMIN — Medication 80 MCG: at 18:25

## 2025-03-24 RX ADMIN — ROCURONIUM 20 MG: 50 INJECTION, SOLUTION INTRAVENOUS at 17:36

## 2025-03-24 RX ADMIN — FENTANYL CITRATE 50 MCG: 50 INJECTION, SOLUTION INTRAMUSCULAR; INTRAVENOUS at 15:29

## 2025-03-24 RX ADMIN — Medication 120 MCG: at 16:21

## 2025-03-24 RX ADMIN — PROPOFOL 100 MG: 10 INJECTION, EMULSION INTRAVENOUS at 15:02

## 2025-03-24 RX ADMIN — ROCURONIUM 10 MG: 50 INJECTION, SOLUTION INTRAVENOUS at 18:44

## 2025-03-24 RX ADMIN — ONDANSETRON 4 MG: 2 INJECTION, SOLUTION INTRAMUSCULAR; INTRAVENOUS at 19:07

## 2025-03-24 RX ADMIN — ONDANSETRON 4 MG: 2 INJECTION INTRAMUSCULAR; INTRAVENOUS at 20:02

## 2025-03-24 RX ADMIN — LIDOCAINE HYDROCHLORIDE 50 MG: 20 INJECTION, SOLUTION INFILTRATION; PERINEURAL at 15:00

## 2025-03-24 RX ADMIN — METOPROLOL TARTRATE 25 MG: 25 TABLET, FILM COATED ORAL at 22:55

## 2025-03-24 RX ADMIN — HEPARIN SODIUM 5000 UNITS: 5000 INJECTION INTRAVENOUS; SUBCUTANEOUS at 15:15

## 2025-03-24 RX ADMIN — FENTANYL CITRATE 50 MCG: 50 INJECTION, SOLUTION INTRAMUSCULAR; INTRAVENOUS at 15:00

## 2025-03-24 RX ADMIN — MIDAZOLAM HYDROCHLORIDE 2 MG: 2 INJECTION, SOLUTION INTRAMUSCULAR; INTRAVENOUS at 14:51

## 2025-03-24 RX ADMIN — ESMOLOL HYDROCHLORIDE 50 MG: 10 INJECTION, SOLUTION INTRAVENOUS at 15:40

## 2025-03-24 RX ADMIN — MAGNESIUM SULFATE HEPTAHYDRATE 2 G: 500 INJECTION, SOLUTION INTRAMUSCULAR; INTRAVENOUS at 18:17

## 2025-03-24 RX ADMIN — ROCURONIUM 10 MG: 50 INJECTION, SOLUTION INTRAVENOUS at 18:50

## 2025-03-24 RX ADMIN — CEFAZOLIN 2 G: 1 INJECTION, POWDER, FOR SOLUTION INTRAMUSCULAR; INTRAVENOUS at 19:12

## 2025-03-24 RX ADMIN — PROPOFOL 20 MG: 10 INJECTION, EMULSION INTRAVENOUS at 15:28

## 2025-03-24 RX ADMIN — ROCURONIUM 20 MG: 50 INJECTION, SOLUTION INTRAVENOUS at 16:15

## 2025-03-24 RX ADMIN — Medication 3 L/MIN: at 20:00

## 2025-03-24 RX ADMIN — HYDROMORPHONE HYDROCHLORIDE 0.5 MG: 0.5 INJECTION, SOLUTION INTRAMUSCULAR; INTRAVENOUS; SUBCUTANEOUS at 20:21

## 2025-03-24 RX ADMIN — PROPOFOL 50 MCG/KG/MIN: 10 INJECTION, EMULSION INTRAVENOUS at 15:54

## 2025-03-24 RX ADMIN — ROCURONIUM 10 MG: 50 INJECTION, SOLUTION INTRAVENOUS at 18:22

## 2025-03-24 RX ADMIN — PHENYLEPHRINE-NACL IV SOLUTION 10 MG/250ML-0.9% 0.2 MCG/KG/MIN: 10-0.9/25 SOLUTION at 16:24

## 2025-03-24 ASSESSMENT — PAIN - FUNCTIONAL ASSESSMENT
PAIN_FUNCTIONAL_ASSESSMENT: 0-10
PAIN_FUNCTIONAL_ASSESSMENT: UNABLE TO SELF-REPORT
PAIN_FUNCTIONAL_ASSESSMENT: 0-10
PAIN_FUNCTIONAL_ASSESSMENT: 0-10
PAIN_FUNCTIONAL_ASSESSMENT: UNABLE TO SELF-REPORT
PAIN_FUNCTIONAL_ASSESSMENT: 0-10
PAIN_FUNCTIONAL_ASSESSMENT: UNABLE TO SELF-REPORT
PAIN_FUNCTIONAL_ASSESSMENT: UNABLE TO SELF-REPORT

## 2025-03-24 ASSESSMENT — PAIN SCALES - GENERAL
PAINLEVEL_OUTOF10: 0 - NO PAIN
PAINLEVEL_OUTOF10: 6
PAINLEVEL_OUTOF10: 5 - MODERATE PAIN
PAINLEVEL_OUTOF10: 9
PAINLEVEL_OUTOF10: 6
PAINLEVEL_OUTOF10: 10 - WORST POSSIBLE PAIN
PAINLEVEL_OUTOF10: 8
PAINLEVEL_OUTOF10: 8
PAIN_LEVEL: 0

## 2025-03-24 ASSESSMENT — PAIN DESCRIPTION - DESCRIPTORS: DESCRIPTORS: ACHING;DISCOMFORT

## 2025-03-24 ASSESSMENT — COLUMBIA-SUICIDE SEVERITY RATING SCALE - C-SSRS
1. IN THE PAST MONTH, HAVE YOU WISHED YOU WERE DEAD OR WISHED YOU COULD GO TO SLEEP AND NOT WAKE UP?: NO
6. HAVE YOU EVER DONE ANYTHING, STARTED TO DO ANYTHING, OR PREPARED TO DO ANYTHING TO END YOUR LIFE?: NO
2. HAVE YOU ACTUALLY HAD ANY THOUGHTS OF KILLING YOURSELF?: NO

## 2025-03-24 ASSESSMENT — PAIN DESCRIPTION - LOCATION: LOCATION: BACK

## 2025-03-24 ASSESSMENT — PAIN DESCRIPTION - ORIENTATION: ORIENTATION: RIGHT

## 2025-03-24 NOTE — ANESTHESIA PROCEDURE NOTES
Airway  Date/Time: 3/24/2025 3:04 PM  Urgency: elective    Airway not difficult    Staffing  Performed: resident   Authorized by: Rony Dobbs MD    Performed by: De Neely MD  Patient location during procedure: OR    Indications and Patient Condition  Indications for airway management: anesthesia  Spontaneous Ventilation: absent  Sedation level: deep  Preoxygenated: yes  Patient position: sniffing  Mask difficulty assessment: 1 - vent by mask    Final Airway Details  Final airway type: endotracheal airway      Successful airway: ETT - double lumen left  Cuffed: yes   Successful intubation technique: video laryngoscopy  Facilitating devices/methods: intubating stylet  Endotracheal tube insertion site: oral  Blade type: Enrique.  Blade size: #3  ETT DL size (fr): 37  Cormack-Lehane Classification: grade I - full view of glottis  Placement verified by: bronchoscopy and capnometry   Number of attempts at approach: 1

## 2025-03-24 NOTE — ANESTHESIA POSTPROCEDURE EVALUATION
"Patient: Princess Nevarez \"Edna\"    Procedure Summary       Date: 03/24/25 Room / Location: [facility] OR 14 / [facility] OR    Anesthesia Start: 1454 Anesthesia Stop: 1947    Procedure: LOBECTOMY, LUNG, ROBOT-ASSISTED (Right: Chest) Diagnosis:       Malignant neoplasm of upper lobe of right lung (Multi)      (Malignant neoplasm of upper lobe of right lung (Multi) [C34.11])    Surgeons: Luis Dos Santos MD Responsible Provider: Hardik Fleming MD    Anesthesia Type: general ASA Status: 3            Anesthesia Type: general    Vitals Value Taken Time   /68 03/24/25 1942   Temp 36.6 03/24/25 1947   Pulse 72 03/24/25 1945   Resp 24 03/24/25 1945   SpO2 97 % 03/24/25 1945   Vitals shown include unfiled device data.    Anesthesia Post Evaluation    Patient location during evaluation: PACU  Patient participation: complete - patient participated  Level of consciousness: awake and alert  Pain score: 0  Pain management: adequate  Airway patency: patent  Cardiovascular status: stable  Respiratory status: spontaneous ventilation  Hydration status: acceptable  Postoperative Nausea and Vomiting: none      No notable events documented.    "

## 2025-03-24 NOTE — ANESTHESIA PROCEDURE NOTES
Arterial Line:    Date/Time: 3/24/2025 3:15 PM    Staffing  Performed: resident   Authorized by: Rony Dobbs MD    Performed by: De Neely MD    An arterial line was placed. Procedure performed using ultrasound guidance.in the OR for the following indication(s): continuous blood pressure monitoring.    A 20 gauge (size) (length), Angiocath (type) catheter was placed into the Left radial artery, secured by Tegaderm,   Seldinger technique used.  Events:  patient tolerated procedure well with no complications.

## 2025-03-24 NOTE — H&P
"History Of Present Illness  Princess Nevarez \"Collin" is a 64 y.o. female presenting with a 1.4 cm spiculated nodule in her right upper lobe with an SUV max of 10.9.  Patient is asymptomatic with regards to this nodule, however its highly suspicious for malignancy.  There been no further changes to her H&P since her last clinic visit on 2/12/2025 with Dr. Dos Santos in office.  Patient denies any fevers, chills, shortness of breath, chest pain, nausea, vomiting, constipation, diarrhea, muscle aches.     Past Medical History  Past Medical History:   Diagnosis Date    Anxiety disorder, unspecified 03/19/2020    Anxiety and depression    Bipolar disorder, currently in remission, most recent episode unspecified (Multi) 03/19/2020    History of depressed bipolar disorder    COPD (chronic obstructive pulmonary disease) (Multi)     Displaced fracture of head of unspecified radius, initial encounter for closed fracture 11/30/2021    Radial head fracture    Dorsalgia, unspecified 03/19/2020    Chronic back pain greater than 3 months duration    Encounter for screening for malignant neoplasm of colon 12/15/2020    Screen for colon cancer    Encounter for screening for malignant neoplasm of rectum 12/15/2020    Screening for rectal cancer    Essential (primary) hypertension 03/19/2020    Benign essential hypertension    Hematuria, unspecified 11/30/2021    Painless hematuria    Hypothyroidism, unspecified 03/19/2020    Acquired hypothyroidism    Nicotine dependence, cigarettes, uncomplicated 10/29/2020    Continuous dependence on cigarette smoking    Pain in right arm 03/21/2017    Arm pain, right    Pain in unspecified shoulder 03/19/2020    Chronic pain in shoulder    Personal history of other diseases of the circulatory system 03/19/2020    History of hypertension    Personal history of other diseases of the digestive system 03/19/2020    History of gastroesophageal reflux (GERD)    Personal history of other diseases of the " musculoskeletal system and connective tissue 10/29/2020    History of arthritis    Personal history of other diseases of the nervous system and sense organs 11/30/2021    History of restless legs syndrome    Personal history of other diseases of the nervous system and sense organs 03/19/2020    History of restless legs syndrome    Personal history of other diseases of the nervous system and sense organs 03/19/2020    History of restless legs syndrome    Personal history of other diseases of the respiratory system 10/13/2022    History of acute bronchitis    Personal history of other diseases of the respiratory system 10/11/2022    History of acute sinusitis    Personal history of other mental and behavioral disorders 08/08/2016    History of anxiety    Personal history of other specified conditions 11/30/2021    History of insomnia    Personal history of other venous thrombosis and embolism 03/19/2020    History of deep venous thrombosis    Personal history of pulmonary embolism 03/19/2020    History of pulmonary embolism    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 Her smoking use included cigarettes. She started smoking about 51 years ago. She has a 76.3 pack-year smoking history. She has never used smokeless tobacco. She reports current alcohol use. She reports that she does not use drugs.    Family History  Family History   Problem Relation Name Age of Onset    Hypertension Mother      Heart disease Mother      Heart attack Father           Allergies  Penicillins    Review of Systems  The remainder the 12 point review of systems was negative except as stated in HPI.    Physical Exam  General Appearance: Well-appearing female, no acute distress  Head: Normocephalic, atraumatic  Eyes: Anicteric sclera  Neck: Trachea is midline  Chest: Equal chest rise bilaterally, satting well on room air  Abdomen: Soft, nondistended, nontender.  : Voiding independently  MSK: Moving all extremities equally, warm and well-perfused  Psych: Appropriate mood and affect    Assessment:  Patient is a 64-year-old female with past medical history significant for the above, also notably ex-smoker who quit in 11/2024 with a 75-pack-year history who presents with 1.4 cm right upper lobe nodule suspicious for malignancy.  Patient presents for elective right upper lobectomy, robotic assisted with Dr. Dos Santos today.    Plan:  -OR today for right upper lobectomy, robotic assisted  -Plan for admission thereafter, floor status  -Please page any questions or concerns    Patient was seen and discussed with attending, Dr. Dos Santos.    Lana Valdes MD, MSc  Thoracic Surgery  Pager [phone]    Lana Valdes MD

## 2025-03-24 NOTE — OP NOTE
"LOBECTOMY, LUNG, ROBOT-ASSISTED (R) Operative Note     Date: 3/24/2025  OR Location: [facility] OR    Name: Princess Nevarez \"Edna\", : 1960, Age: 64 y.o., MRN: [id number], Sex: female    Diagnosis  Pre-op Diagnosis      * Malignant neoplasm of upper lobe of right lung (Multi) [C34.11] Post-op Diagnosis     * Malignant neoplasm of upper lobe of right lung (Multi) [C34.11]     Procedures  Bronchoscopy  Robotic right upper lobectomy  Mediastinal rocael dissection  Lysis of adhesions      Surgeons      * Luis Dos Santos - Primary    Resident/Fellow/Other Assistant:  Surgeons and Role:     * Shira Navas PA-C - [facility] Assist    Staff:   Circulator: Dianne  Circulator: Jim Kruse Person: Mirza Raymond Circulator: Genesis Raymond Scrub: Marie  Circulator: Karo  Relief Circulator: Windy  Relief Scrub: Jason    Anesthesia Staff: Anesthesiologist: Hardik Fleming MD; Rony Dobbs MD  Anesthesia Resident: De Neely MD; Demario Torres DO    Procedure Summary  Anesthesia: General  ASA: III  Estimated Blood Loss: 150mL  Intra-op Medications:   Administrations occurring from 1330 to 1835 on 25:   Medication Name Total Dose   albuterol (Proventil, Ventolin, ProAir) inhaler 108 (90 BASE) mcg/act 4 puff   aprepitant (Emend) capsule 40 mg   ceFAZolin (Ancef) vial 1 g 2 g   dexmedeTOMIDine 4 mcg/mL in 100 mL NS infusion 63.6 mcg   esmolol 10 mg/mL 50 mg   fentaNYL (Sublimaze) injection 50 mcg/mL 100 mcg   heparin 5,000 units/mL 5,000 Units   ketamine (Ketalar) 10 mg/mL injection 20 mg   LR bolus Cannot be calculated   lidocaine (Xylocaine) injection 2 % 50 mg   magnesium sulfate 50% 2 g   methadone (Dolophine) tablet 10 mg   midazolam PF (Versed) injection 1 mg/mL 2 mg   phenylephrine (Velasquez-Synephrine) 10 mg in sodium chloride 0.9% 250 mL (0.04 mg/mL) infusion (premix) 2.92 mg   phenylephrine 40 mcg/mL syringe 10 mL 200 mcg   propofol (Diprivan) injection 10 mg/mL 552.98 mg " "  rocuronium (ZeMuron) 50 mg/5 mL injection 130 mg              Anesthesia Record               Intraprocedure I/O Totals          Intake    Dexmedetomidine 0.00 mL    The total shown is the total volume documented since Anesthesia Start was filed.    Ketamine 0.00 mL    The total shown is the total volume documented since Anesthesia Start was filed.    Phenylephrine Drip 0.00 mL    The total shown is the total volume documented since Anesthesia Start was filed.    Total Intake 0 mL       Output    Urine 105 mL    Total Output 105 mL       Net    Net Volume -105 mL          Specimen:   ID Type Source Tests Collected by Time   1 : LEVEL 9 LYMPH NODE Tissue LYMPH NODE PULMONARY (SPECIFY SITE) SURGICAL PATHOLOGY EXAM Luis Dos Santos MD 3/24/2025 1556   2 : LEVEL 8 LYMPH NODE Tissue LYMPH NODE PULMONARY (SPECIFY SITE) SURGICAL PATHOLOGY EXAM Luis Dos Santos MD 3/24/2025 1600   3 : LEVEL 7 LYMPH NODE Tissue LYMPH NODE PULMONARY (SPECIFY SITE) SURGICAL PATHOLOGY EXAM Luis Dos Santos MD 3/24/2025 1614   4 : LEVEL 11 LYMPH NODE Tissue LYMPH NODE PULMONARY (SPECIFY SITE) SURGICAL PATHOLOGY EXAM Luis Dos Santos MD 3/24/2025 1626   5 : RIGHT PARATRACHEAL NODE Tissue LYMPH NODE PULMONARY (SPECIFY SITE) SURGICAL PATHOLOGY EXAM Luis Dos Santos MD 3/24/2025 1743   6 : LEVEL 12 LYMPH NODE Tissue LYMPH NODE PULMONARY (SPECIFY SITE) SURGICAL PATHOLOGY EXAM Luis Dos Santos MD 3/24/2025 1845   7 : RIGHT UPPER LOBE Tissue LUNG LOBECTOMY/SEGMENTECTOMY RIGHT (SPECIFY SITE) SURGICAL PATHOLOGY EXAM Luis Dos Santos MD 3/24/2025 1852                 Drains and/or Catheters:   Chest Tube 1 Right Pleural 28 Fr (Active)   Function -20 cm H2O 03/24/25 1902   Dressing Status Clean;Dry;Occlusive 03/24/25 1902       Urethral Catheter Non-latex 16 Fr. (Active)           Indications: Princess Nevarez \"Edna\" is an 64 y.o. female who is having surgery for Malignant neoplasm of upper lobe of right lung (Multi) [C34.11].     The patient was seen " in the preoperative area. The risks, benefits, complications, treatment options, non-operative alternatives, expected recovery and outcomes were discussed with the patient. The possibilities of reaction to medication, pulmonary aspiration, injury to surrounding structures, bleeding, recurrent infection, the need for additional procedures, failure to diagnose a condition, and creating a complication requiring transfusion or operation were discussed with the patient. The patient concurred with the proposed plan, giving informed consent.  The site of surgery was properly noted/marked if necessary per policy. The patient has been actively warmed in preoperative area. Preoperative antibiotics have been ordered and given within 1 hours of incision. Venous thrombosis prophylaxis have been ordered including bilateral sequential compression devices and chemical prophylaxis    Procedure Details: After satisfactory duction of general trach tube anesthesia bronchoscopy was performed which was normal anatomy and showed no endobronchial spread of cancer that would preclude resection.  The patient was placed in left lateral decubitus position and was prepped and draped in usual sterile fashion.  In the lateral axial line approximately eighth interspace 8 mm incision was made pneumothorax was induced and a port was placed.  Anterior to this seventh interspace a 12 mm port was placed.  Posterior to the initial port a 12 mm port was placed.  Posterior to this an 8 mm port was placed.  Inferior to this a 12 mm accessory port was placed.  Thoracoscopy chest revealed adhesions between the lower lobe and mediastinum and middle lobe and upper lobe and mediastinum which were taken down.  There were also adhesions of the upper lobe to the apical chest which were taken down.  The tumor was visualized in the posterior aspect of the upper lobe abutting and adhered to the lower lobe.  The inferior pulm ligament was lysed with bipolar.  The  posterior pleural infection was divided.  Level 9 and 8 lymph nodes were harvested.  Level 7 lymph nodes were harvested.  Lung was retracted anteriorly the level 11 lymph node between the bronchus intermedius and right upper lobe bronchus was dissected free it was very vascular and diseased and it was sent as level 11.  Posteriorly the azygos vein appeared to be adherent to the tumor.  Bipolar dissection was attempted however appeared to be invaded.  Posteriorly azygos vein was isolated and was divided using endovascular stapler the lung was retracted posteriorly the azygos vein was isolated anteriorly and divided using endovascular stapler.  The azygos vein was dissected away from the right main bronchus and left on the specimen.  The upper lobe retracted inferiorly.  The apical pleural was lysed.  The pulmonary artery was identified.  It was dissected partially away from the upper lobe bronchus however there was some attachments distally likely possibly related to tumor.  The lung was retracted anteriorly dissection proceeded between the level 11 lymph node and the upper lobe bronchus.  There was significant adhesions here and dissection at this time was not possible.  The pulm artery was found in the fissure.  The superior segmental artery was found.  The window posteriorly to the level 11 space was seen the posterior fissure was divided using successive applications of thick tissue staplers obtaining well clear margins on the tumor.  Anteriorly the vein was identified the upper lobe vein was dissected free and was divided using endovascular stapler.  The truncus anterior was dissected free there was a separate apical branch.  The large diseased lymph node between the upper lobe bronchus and truncus was difficult to dissect.  Next the anterior fissure was divided using a thick tissue stapler.  This exposed the truncus anterior.  The diseased lymph node was harvested away from the truncus anterior.  This artery  was isolated and divided using endovascular stapler.  The apical branch of the pulmonary artery was dissected free and was divided using endovascular stapler.  The diseased lymph node on the upper lobe bronchus was dissected up into the specimen was partially calcified.  It was sent as a level 12 lymph node.  The bronchus was cleared.  The upper lobe bronchus was divided using a green thickness stapler.  The specimen was placed in a bag and removed.  Inspection was made hemostasis which was good.  Should be noted a right paratracheal lymph node behind the azygos vein was harvested earlier and sent as a right paratracheal node.  Rib blocks were performed using Marcaine.  A 28 straight chest tube with an exercise was placed post apically and secured to skin using number Prolene.  The lung inflated well and all lobes were in good orientation after insufflation.  Remaining incisions were closed using a deep layer 2-0 Vicryl skin or Monocryl.  Postoperative bronchoscopy revealed scant mucus and a well sealed right upper lobe stump and normal orientation of the middle lobe and lower lobe bronchi which were patent.  The tubing of difficulties operation was significantly increased due to adhesions and do the heavily diseased lymph nodes around the bronchus and between the arteries.  Complications:  None; patient tolerated the procedure well.    Disposition: PACU - hemodynamically stable.  Condition: stable             Task Performed by [facility] First Assist or Physician Assistant:   Shira REID/NP, was necessary to assist on this case due to the nature of the case and difficulty. During the case she served as my assist by retraction, introducing instrument and suction        Luis Dos Santos  Phone Number: [phone]

## 2025-03-24 NOTE — ANESTHESIA PREPROCEDURE EVALUATION
"Patient: Princess Nevarez \"Edna\"    Procedure Information       Date/Time: 03/24/25 1330    Procedure: LOBECTOMY, LUNG, ROBOT-ASSISTED (Right: Chest)    Location: [facility] OR 14 / [facility] OR    Surgeons: Luis Dos Santos MD            Relevant Problems   Anesthesia   (+) PONV (postoperative nausea and vomiting)      Cardiac   (+) Benign essential hypertension      Pulmonary   (+) COPD exacerbation (Multi)   (+) Malignant neoplasm of upper lobe of right lung (Multi)      Neuro   (+) CON (generalized anxiety disorder)      GI   (+) GERD (gastroesophageal reflux disease)       Clinical information reviewed:   Tobacco  Allergies  Meds   Med Hx  Surg Hx   Fam Hx  Soc Hx        NPO Detail:  NPO/Void Status  Carbohydrate Drink Given Prior to Surgery? : N  Date of Last Liquid: 03/23/25  Time of Last Liquid: 2300  Date of Last Solid: 03/23/25  Time of Last Solid: 2300  Last Intake Type: Clear fluids  Time of Last Void: 1300         Physical Exam    Airway  Mallampati: II  TM distance: >3 FB  Neck ROM: full     Cardiovascular - normal exam     Dental   (+) upper dentures, lower dentures     Pulmonary - normal exam     Abdominal - normal exam             Anesthesia Plan    History of general anesthesia?: yes  History of complications of general anesthesia?: no    ASA 3     general     intravenous induction   Postoperative administration of opioids is intended.  Trial extubation is planned.  Anesthetic plan and risks discussed with patient.  Use of blood products discussed with patient who consented to blood products.    Plan discussed with resident.      "

## 2025-03-25 ENCOUNTER — APPOINTMENT (OUTPATIENT)
Dept: RADIOLOGY | Facility: HOSPITAL | Age: 65
End: 2025-03-25
Payer: MEDICARE

## 2025-03-25 LAB
ANION GAP SERPL CALC-SCNC: 16 MMOL/L (ref 10–20)
BUN SERPL-MCNC: 16 MG/DL (ref 6–23)
CALCIUM SERPL-MCNC: 8.4 MG/DL (ref 8.6–10.6)
CHLORIDE SERPL-SCNC: 104 MMOL/L (ref 98–107)
CO2 SERPL-SCNC: 24 MMOL/L (ref 21–32)
CREAT SERPL-MCNC: 0.59 MG/DL (ref 0.5–1.05)
EGFRCR SERPLBLD CKD-EPI 2021: >90 ML/MIN/1.73M*2
ERYTHROCYTE [DISTWIDTH] IN BLOOD BY AUTOMATED COUNT: 12.8 % (ref 11.5–14.5)
GLUCOSE SERPL-MCNC: 130 MG/DL (ref 74–99)
HCT VFR BLD AUTO: 34.6 % (ref 36–46)
HGB BLD-MCNC: 11 G/DL (ref 12–16)
MAGNESIUM SERPL-MCNC: 2.62 MG/DL (ref 1.6–2.4)
MCH RBC QN AUTO: 33.3 PG (ref 26–34)
MCHC RBC AUTO-ENTMCNC: 31.8 G/DL (ref 32–36)
MCV RBC AUTO: 105 FL (ref 80–100)
NRBC BLD-RTO: 0 /100 WBCS (ref 0–0)
PLATELET # BLD AUTO: 328 X10*3/UL (ref 150–450)
POTASSIUM SERPL-SCNC: 4.3 MMOL/L (ref 3.5–5.3)
RBC # BLD AUTO: 3.3 X10*6/UL (ref 4–5.2)
SODIUM SERPL-SCNC: 140 MMOL/L (ref 136–145)
WBC # BLD AUTO: 15.2 X10*3/UL (ref 4.4–11.3)

## 2025-03-25 PROCEDURE — 2500000004 HC RX 250 GENERAL PHARMACY W/ HCPCS (ALT 636 FOR OP/ED): Performed by: PHYSICIAN ASSISTANT

## 2025-03-25 PROCEDURE — 97161 PT EVAL LOW COMPLEX 20 MIN: CPT | Mod: GP

## 2025-03-25 PROCEDURE — 71045 X-RAY EXAM CHEST 1 VIEW: CPT

## 2025-03-25 PROCEDURE — 1200000002 HC GENERAL ROOM WITH TELEMETRY DAILY

## 2025-03-25 PROCEDURE — 2500000005 HC RX 250 GENERAL PHARMACY W/O HCPCS: Performed by: PHYSICIAN ASSISTANT

## 2025-03-25 PROCEDURE — 2500000001 HC RX 250 WO HCPCS SELF ADMINISTERED DRUGS (ALT 637 FOR MEDICARE OP): Performed by: PHYSICIAN ASSISTANT

## 2025-03-25 PROCEDURE — 97165 OT EVAL LOW COMPLEX 30 MIN: CPT | Mod: GO

## 2025-03-25 PROCEDURE — 82374 ASSAY BLOOD CARBON DIOXIDE: CPT | Performed by: PHYSICIAN ASSISTANT

## 2025-03-25 PROCEDURE — 83735 ASSAY OF MAGNESIUM: CPT | Performed by: PHYSICIAN ASSISTANT

## 2025-03-25 PROCEDURE — 97530 THERAPEUTIC ACTIVITIES: CPT | Mod: GP

## 2025-03-25 PROCEDURE — 85027 COMPLETE CBC AUTOMATED: CPT | Performed by: PHYSICIAN ASSISTANT

## 2025-03-25 PROCEDURE — 94640 AIRWAY INHALATION TREATMENT: CPT

## 2025-03-25 PROCEDURE — 71045 X-RAY EXAM CHEST 1 VIEW: CPT | Performed by: RADIOLOGY

## 2025-03-25 PROCEDURE — 36415 COLL VENOUS BLD VENIPUNCTURE: CPT | Performed by: PHYSICIAN ASSISTANT

## 2025-03-25 PROCEDURE — 97535 SELF CARE MNGMENT TRAINING: CPT | Mod: GO

## 2025-03-25 PROCEDURE — 2500000004 HC RX 250 GENERAL PHARMACY W/ HCPCS (ALT 636 FOR OP/ED)

## 2025-03-25 RX ORDER — PROCHLORPERAZINE 25 MG/1
25 SUPPOSITORY RECTAL EVERY 12 HOURS PRN
Status: DISCONTINUED | OUTPATIENT
Start: 2025-03-25 | End: 2025-03-25

## 2025-03-25 RX ORDER — PROCHLORPERAZINE MALEATE 10 MG
10 TABLET ORAL EVERY 6 HOURS PRN
Status: DISCONTINUED | OUTPATIENT
Start: 2025-03-25 | End: 2025-03-25

## 2025-03-25 RX ORDER — MORPHINE SULFATE IN 0.9 % NACL 30 MG/30ML
PATIENT CONTROLLED ANALGESIA SYRINGE INTRAVENOUS CONTINUOUS
Status: DISCONTINUED | OUTPATIENT
Start: 2025-03-25 | End: 2025-03-25

## 2025-03-25 RX ORDER — METOPROLOL TARTRATE 25 MG/1
12.5 TABLET, FILM COATED ORAL 2 TIMES DAILY
Status: DISCONTINUED | OUTPATIENT
Start: 2025-03-25 | End: 2025-03-27 | Stop reason: HOSPADM

## 2025-03-25 RX ORDER — SODIUM CHLORIDE, SODIUM LACTATE, POTASSIUM CHLORIDE, CALCIUM CHLORIDE 600; 310; 30; 20 MG/100ML; MG/100ML; MG/100ML; MG/100ML
50 INJECTION, SOLUTION INTRAVENOUS CONTINUOUS
Status: DISCONTINUED | OUTPATIENT
Start: 2025-03-25 | End: 2025-03-26

## 2025-03-25 RX ORDER — KETOROLAC TROMETHAMINE 15 MG/ML
15 INJECTION, SOLUTION INTRAMUSCULAR; INTRAVENOUS EVERY 6 HOURS
Status: DISCONTINUED | OUTPATIENT
Start: 2025-03-25 | End: 2025-03-26

## 2025-03-25 RX ORDER — HYDROXYZINE HYDROCHLORIDE 10 MG/1
10 TABLET, FILM COATED ORAL EVERY 6 HOURS PRN
Status: DISCONTINUED | OUTPATIENT
Start: 2025-03-25 | End: 2025-03-27 | Stop reason: HOSPADM

## 2025-03-25 RX ORDER — PROCHLORPERAZINE EDISYLATE 5 MG/ML
10 INJECTION INTRAMUSCULAR; INTRAVENOUS EVERY 6 HOURS PRN
Status: DISCONTINUED | OUTPATIENT
Start: 2025-03-25 | End: 2025-03-27 | Stop reason: HOSPADM

## 2025-03-25 RX ORDER — HEPARIN SODIUM 5000 [USP'U]/ML
5000 INJECTION, SOLUTION INTRAVENOUS; SUBCUTANEOUS EVERY 8 HOURS
Status: DISCONTINUED | OUTPATIENT
Start: 2025-03-25 | End: 2025-03-27 | Stop reason: HOSPADM

## 2025-03-25 RX ORDER — NALOXONE HYDROCHLORIDE 0.4 MG/ML
0.2 INJECTION, SOLUTION INTRAMUSCULAR; INTRAVENOUS; SUBCUTANEOUS AS NEEDED
Status: DISCONTINUED | OUTPATIENT
Start: 2025-03-25 | End: 2025-03-27 | Stop reason: HOSPADM

## 2025-03-25 RX ORDER — LIDOCAINE 560 MG/1
1 PATCH PERCUTANEOUS; TOPICAL; TRANSDERMAL EVERY 24 HOURS
Status: DISCONTINUED | OUTPATIENT
Start: 2025-03-25 | End: 2025-03-27 | Stop reason: HOSPADM

## 2025-03-25 RX ORDER — HYDROMORPHONE HCL/0.9% NACL/PF 15 MG/30ML
PATIENT CONTROLLED ANALGESIA SYRINGE INTRAVENOUS CONTINUOUS
Status: DISCONTINUED | OUTPATIENT
Start: 2025-03-25 | End: 2025-03-25

## 2025-03-25 RX ORDER — TRAMADOL HYDROCHLORIDE 50 MG/1
50 TABLET ORAL EVERY 8 HOURS PRN
Status: DISCONTINUED | OUTPATIENT
Start: 2025-03-25 | End: 2025-03-26

## 2025-03-25 RX ADMIN — CEFAZOLIN SODIUM 2 G: 2 INJECTION, SOLUTION INTRAVENOUS at 11:32

## 2025-03-25 RX ADMIN — ONDANSETRON 4 MG: 2 INJECTION INTRAMUSCULAR; INTRAVENOUS at 01:16

## 2025-03-25 RX ADMIN — SENNOSIDES AND DOCUSATE SODIUM 2 TABLET: 50; 8.6 TABLET ORAL at 21:02

## 2025-03-25 RX ADMIN — FLUTICASONE FUROATE AND VILANTEROL TRIFENATATE 1 PUFF: 100; 25 POWDER RESPIRATORY (INHALATION) at 09:33

## 2025-03-25 RX ADMIN — METOPROLOL TARTRATE 12.5 MG: 25 TABLET, FILM COATED ORAL at 21:02

## 2025-03-25 RX ADMIN — ACETAMINOPHEN 650 MG: 325 TABLET, FILM COATED ORAL at 22:18

## 2025-03-25 RX ADMIN — PROCHLORPERAZINE EDISYLATE 10 MG: 5 INJECTION INTRAMUSCULAR; INTRAVENOUS at 06:13

## 2025-03-25 RX ADMIN — KETOROLAC TROMETHAMINE 15 MG: 15 INJECTION, SOLUTION INTRAMUSCULAR; INTRAVENOUS at 18:45

## 2025-03-25 RX ADMIN — HEPARIN SODIUM 5000 UNITS: 5000 INJECTION, SOLUTION INTRAVENOUS; SUBCUTANEOUS at 09:03

## 2025-03-25 RX ADMIN — PAROXETINE 10 MG: 10 TABLET, FILM COATED ORAL at 21:02

## 2025-03-25 RX ADMIN — TIOTROPIUM BROMIDE INHALATION SPRAY 2 PUFF: 3.12 SPRAY, METERED RESPIRATORY (INHALATION) at 09:33

## 2025-03-25 RX ADMIN — SODIUM CHLORIDE, POTASSIUM CHLORIDE, SODIUM LACTATE AND CALCIUM CHLORIDE 50 ML/HR: 600; 310; 30; 20 INJECTION, SOLUTION INTRAVENOUS at 11:32

## 2025-03-25 RX ADMIN — TRAMADOL HYDROCHLORIDE 50 MG: 50 TABLET, COATED ORAL at 22:18

## 2025-03-25 RX ADMIN — SENNOSIDES AND DOCUSATE SODIUM 2 TABLET: 50; 8.6 TABLET ORAL at 09:03

## 2025-03-25 RX ADMIN — ONDANSETRON 4 MG: 2 INJECTION INTRAMUSCULAR; INTRAVENOUS at 09:07

## 2025-03-25 RX ADMIN — TRAMADOL HYDROCHLORIDE 50 MG: 50 TABLET, COATED ORAL at 09:03

## 2025-03-25 RX ADMIN — ACETAMINOPHEN 650 MG: 325 TABLET, FILM COATED ORAL at 04:04

## 2025-03-25 RX ADMIN — ACETAMINOPHEN 650 MG: 325 TABLET, FILM COATED ORAL at 10:31

## 2025-03-25 RX ADMIN — ACETAMINOPHEN 650 MG: 325 TABLET, FILM COATED ORAL at 15:49

## 2025-03-25 RX ADMIN — LIDOCAINE 4% 1 PATCH: 40 PATCH TOPICAL at 17:08

## 2025-03-25 RX ADMIN — CEFAZOLIN SODIUM 2 G: 2 INJECTION, SOLUTION INTRAVENOUS at 04:04

## 2025-03-25 RX ADMIN — Medication: at 05:55

## 2025-03-25 RX ADMIN — KETOROLAC TROMETHAMINE 15 MG: 15 INJECTION, SOLUTION INTRAMUSCULAR; INTRAVENOUS at 12:58

## 2025-03-25 RX ADMIN — PANTOPRAZOLE SODIUM 40 MG: 40 TABLET, DELAYED RELEASE ORAL at 09:03

## 2025-03-25 RX ADMIN — ONDANSETRON 4 MG: 2 INJECTION INTRAMUSCULAR; INTRAVENOUS at 17:09

## 2025-03-25 RX ADMIN — HEPARIN SODIUM 5000 UNITS: 5000 INJECTION, SOLUTION INTRAVENOUS; SUBCUTANEOUS at 15:49

## 2025-03-25 ASSESSMENT — COGNITIVE AND FUNCTIONAL STATUS - GENERAL
WALKING IN HOSPITAL ROOM: A LITTLE
MOBILITY SCORE: 24
STANDING UP FROM CHAIR USING ARMS: A LITTLE
DRESSING REGULAR LOWER BODY CLOTHING: A LOT
PERSONAL GROOMING: A LITTLE
TURNING FROM BACK TO SIDE WHILE IN FLAT BAD: A LOT
TOILETING: A LITTLE
MOVING FROM LYING ON BACK TO SITTING ON SIDE OF FLAT BED WITH BEDRAILS: A LITTLE
DRESSING REGULAR UPPER BODY CLOTHING: A LITTLE
DAILY ACTIVITIY SCORE: 24
MOVING TO AND FROM BED TO CHAIR: A LITTLE
CLIMB 3 TO 5 STEPS WITH RAILING: A LOT
HELP NEEDED FOR BATHING: A LITTLE
EATING MEALS: A LITTLE
DAILY ACTIVITIY SCORE: 17
MOBILITY SCORE: 16

## 2025-03-25 ASSESSMENT — ACTIVITIES OF DAILY LIVING (ADL)
HOME_MANAGEMENT_TIME_ENTRY: 15
ADL_ASSISTANCE: INDEPENDENT
EFFECT OF PAIN ON DAILY ACTIVITIES: COMFORT
ADL_ASSISTANCE: INDEPENDENT

## 2025-03-25 ASSESSMENT — PAIN - FUNCTIONAL ASSESSMENT
PAIN_FUNCTIONAL_ASSESSMENT: 0-10

## 2025-03-25 ASSESSMENT — PAIN DESCRIPTION - DESCRIPTORS: DESCRIPTORS: ACHING;DISCOMFORT

## 2025-03-25 ASSESSMENT — PAIN DESCRIPTION - LOCATION
LOCATION: INCISION
LOCATION: INCISION

## 2025-03-25 ASSESSMENT — PAIN SCALES - GENERAL
PAINLEVEL_OUTOF10: 8
PAINLEVEL_OUTOF10: 10 - WORST POSSIBLE PAIN
PAINLEVEL_OUTOF10: 9
PAINLEVEL_OUTOF10: 5 - MODERATE PAIN
PAINLEVEL_OUTOF10: 4
PAINLEVEL_OUTOF10: 8
PAINLEVEL_OUTOF10: 7
PAINLEVEL_OUTOF10: 10 - WORST POSSIBLE PAIN

## 2025-03-25 ASSESSMENT — PAIN DESCRIPTION - ORIENTATION: ORIENTATION: RIGHT

## 2025-03-25 NOTE — PROGRESS NOTES
"Occupational Therapy    Evaluation/Treatment    Patient Name: Princess Nevarez \"Edna\"  MRN: [id number]  Today's Date: 3/25/2025  Time Calculation  Start Time: 1255  Stop Time: 1325  Time Calculation (min): 30 min    Assessment  IP OT Assessment  OT Assessment: Pt required min assist x 1 with HOB elevated with bed mobility, required mod assist x 1 with scooting forward to the side of the bed. Pt required min assist with functional transfers using a wheeled walker. While ambulating from the bed to the bathroom pt with complain of dizziness, required the chair to be pulled up under her, pt's BP was stable. RN was notified. Pt then ambulated to the bathroom with CGA and fair dynamic standing balance. Pt required set-up with toileting and hand hygiene. At the end of the session pt with complain of nausea. RN was notified. Pt was in the chair.  Prognosis: Good  Barriers to Discharge Home: No anticipated barriers  Evaluation/Treatment Tolerance: Patient tolerated treatment well  Medical Staff Made Aware: Yes  End of Session Communication: Bedside nurse  End of Session Patient Position: Up in chair, Alarm off, not on at start of session  Plan:  Treatment Interventions: ADL retraining, Functional transfer training, Endurance training  OT Frequency: 3 times per week  OT Discharge Recommendations: Low intensity level of continued care  OT Recommended Transfer Status: Minimal assist, Assist of 1  OT - OK to Discharge: Yes    Subjective     Current Problem:  1. Malignant neoplasm of upper lobe of right lung (Multi)  Surgical Pathology Exam    Surgical Pathology Exam          General:  Reason for Referral: Malignant neoplasm of upper lobe of right lung (Multi). 1. Bronchoscopy  2. Robotic right upper lobectomy  3. Mediastinal rocael dissection  4. Lysis of adhesions  Past Medical History Relevant to Rehab: ex-smoker who quit in 11/2024 with a 75-pack-year history who presents with 1.4 cm right upper lobe nodule suspicious for " malignancy.  Co-Treatment: PT  Co-Treatment Reason: Per RN pt unable to tolerate upright position due to dizziness earlier, pt required two skilled therapist to assist with functional bed mobility and manage lines.  Prior to Session Communication: Bedside nurse  Patient Position Received: Bed, 3 rail up, Alarm off, not on at start of session  Family/Caregiver Present: No  General Comment: Supine with HOB elevated at the start of the session.     Precautions:  Medical Precautions: Fall precautions, Oxygen therapy device and L/min, Chest tube    Pain:  Pain Assessment  Pain Assessment: 0-10  0-10 (Numeric) Pain Score: 10 - Worst possible pain  Pain Type: Surgical pain, Acute pain  Pain Location: Chest (and back)  Pain Interventions: Repositioned      Objective   Cognition:  Overall Cognitive Status: Within Functional Limits  Orientation Level: Oriented X4  Insight: Within function limits             Home Living:  Type of Home: House  Lives With: Spouse  Home Adaptive Equipment: Walker rolling or standard, Cane (3 wheeled walkers)  Home Layout: Two level, Laundry main level, Full bath main level, Bed/bath upstairs (freezer in the basement)  Home Access: Stairs to enter with rails  Entrance Stairs-Rails: Both  Entrance Stairs-Number of Steps: 2 (from the front door 2 SAMANTHA, from the garage 1 SAMANTHA)  Bathroom Shower/Tub: Tub/shower unit, Walk-in shower  Bathroom Equipment: Shower chair with back     Prior Function:  Level of [place]: Independent with ADLs and functional transfers  ADL Assistance: Independent  Homemaking Assistance: Independent  Ambulatory Assistance: Independent  Hand Dominance: Right  Prior Function Comments: spouse drives, both pt and spouse grocery shop together.    ADL:  Eating Assistance: Stand by  Grooming Assistance: Stand by    Activity Tolerance:  Endurance: Decreased tolerance for upright activites    Balance:  Dynamic Standing Balance  Dynamic Standing-Balance Support: Bilateral upper  extremity supported  Dynamic Standing-Level of Assistance: Contact guard  Dynamic Standing-Balance:  (using a wheeled walker)    Bed Mobility/Transfers: Bed Mobility  Bed Mobility: Yes  Bed Mobility 1  Bed Mobility 1: Supine to sitting  Level of Assistance 1: Minimum assistance, Minimal verbal cues  Bed Mobility Comments 1: HOB elevated  Bed Mobility 2  Bed Mobility  2: Scooting  Level of Assistance 2: Moderate assistance, Minimal verbal cues  Bed Mobility Comments 2: scooting at the side of the bed.   and Transfers  Transfer: Yes  Transfer 1  Transfer From 1: Bed to  Transfer to 1: Stand  Technique 1: Sit to stand  Transfer Device 1: Walker  Transfer Level of Assistance 1: Minimum assistance, Minimal verbal cues  Trials/Comments 1: from an elevated surface  Transfers 2  Transfer From 2: Stand to  Transfer to 2: Sit, Chair with arms  Technique 2: Stand to sit  Transfer Device 2: Walker  Transfer Level of Assistance 2: Contact guard, Minimal verbal cues  Transfers 3  Transfer From 3: Stand to  Transfer to 3: Toilet  Technique 3: Sit to stand, Stand to sit  Transfer Device 3: Walker  Transfer Level of Assistance 3: Contact guard, Minimal verbal cues    Vision:     and Vision - Complex Assessment  Ocular Range of Motion: Within Functional Limits    Sensation:  Light Touch: No apparent deficits    Perception:  Inattention/Neglect: Appears intact    Coordination:  Movements are Fluid and Coordinated: Yes     Hand Function:  Hand Function  Gross Grasp: Functional  Coordination: Functional    Extremities: RUE   RUE : Within Functional Limits, LUE   LUE: Within Functional Limits,  , and      Outcome Measures: [facility] Daily Activity  Putting on and taking off regular lower body clothing: A lot  Bathing (including washing, rinsing, drying): A little  Putting on and taking off regular upper body clothing: A little  Toileting, which includes using toilet, bedpan or urinal: A little  Taking care of personal grooming such as  brushing teeth: A little  Eating Meals: A little  Daily Activity - Total Score: 17         ,     OT Adult Other Outcome Measures  4AT: negative    Education Documentation  Body Mechanics, taught by June Mendoza OT at 3/25/2025  2:09 PM.  Learner: Patient  Readiness: Acceptance  Method: Explanation  Response: Verbalizes Understanding    ADL Training, taught by June Mendoza OT at 3/25/2025  2:09 PM.  Learner: Patient  Readiness: Acceptance  Method: Explanation  Response: Verbalizes Understanding    Education Comments  No comments found.        Goals:   Encounter Problems       Encounter Problems (Active)       ADLs       Patient will perform UB and LB bathing  with modified independent level of assistance and grab bars. (Progressing)       Start:  03/25/25    Expected End:  04/15/25            Patient with complete lower body dressing with modified independent level of assistance donning and doffing all LE clothes  with PRN adaptive equipment while supported sitting (Progressing)       Start:  03/25/25    Expected End:  04/15/25            Patient will complete toileting including hygiene clothing management/hygiene with modified independent level of assistance and grab bars. (Progressing)       Start:  03/25/25    Expected End:  04/15/25               BALANCE       Pt will maintain dynamic standing balance during ADL task with modified independent level of assistance in order to demonstrate decreased risk of falling and improved postural control. (Progressing)       Start:  03/25/25    Expected End:  04/15/25            Patient will tolerate standing for 10 minutes to modified independent level of assistance with least restrictive device in order to improve functional activity tolerance for ADL tasks. (Progressing)       Start:  03/25/25    Expected End:  04/15/25               COGNITION/SAFETY          MOBILITY       Patient will perform Functional mobility min Household distances/Community Distances with  modified independent level of assistance and least restrictive device in order to improve safety and functional mobility. (Progressing)       Start:  03/25/25    Expected End:  04/15/25               TRANSFERS       Patient will perform bed mobility modified independent level of assistance and bed rails in order to improve safety and independence with mobility (Progressing)       Start:  03/25/25    Expected End:  04/15/25            Patient will complete sit to stand transfer with modified independent level of assistance and least restrictive device in order to improve safety and prepare for out of bed mobility. (Progressing)       Start:  03/25/25    Expected End:  04/15/25                   Treatment Completed on Evaluation    Activities of Daily Living:      Toileting  Toileting Level of Assistance: Setup  Where Assessed: Toilet  Toileting Comments: [facility]           03/25/25 at 2:11 PM   June BOWIE/L, OTAILYN  Rehab Office: [phone]

## 2025-03-25 NOTE — CARE PLAN
Problem: Pain - Adult  Goal: Verbalizes/displays adequate comfort level or baseline comfort level  Outcome: Progressing     Problem: Safety - Adult  Goal: Free from fall injury  Outcome: Progressing     Problem: Discharge Planning  Goal: Discharge to home or other facility with appropriate resources  Outcome: Progressing     Problem: Chronic Conditions and Co-morbidities  Goal: Patient's chronic conditions and co-morbidity symptoms are monitored and maintained or improved  Outcome: Progressing     Problem: Nutrition  Goal: Nutrient intake appropriate for maintaining nutritional needs  Outcome: Progressing     Problem: Skin  Goal: Decreased wound size/increased tissue granulation at next dressing change  Outcome: Progressing  Goal: Participates in plan/prevention/treatment measures  Outcome: Progressing  Goal: Prevent/manage excess moisture  Outcome: Progressing  Flowsheets (Taken 3/25/2025 1026)  Prevent/manage excess moisture: Moisturize dry skin  Goal: Prevent/minimize sheer/friction injuries  Outcome: Progressing  Goal: Promote/optimize nutrition  Outcome: Progressing  Goal: Promote skin healing  Outcome: Progressing

## 2025-03-25 NOTE — PROGRESS NOTES
03/25/25 0929   Discharge Planning   Living Arrangements Spouse/significant other   Support Systems Spouse/significant other   Assistance Needed N/A   Do you have animals or pets at home? Yes   Type of Animals or Pets 1 Dog 1 cat   Who is requesting discharge planning? Provider   Home or Post Acute Services None   Expected Discharge Disposition Home   Does the patient need discharge transport arranged? No     Met with patient to introduce myself, role and discuss discharge planning.  Patient lives with her spouse.  Patient requires no assist devices for mobility, but does have a walker and cane.  Patient denies any recent falls.  Patient feel safe at home and denies any issues with making follow up appointments or getting her medications.  Patient can drives, but spouse does most of the driving.  Patient denies active home care or home care needs.  Patient has expressed no questions and no social work concerns.  PCP:  Frances Valentin  Pharmacy:  [facility]  Home Care:  N/A  DME:  N/A

## 2025-03-25 NOTE — PROGRESS NOTES
Physical Therapy    Physical Therapy Evaluation & Treatment    Patient Name: Edna Nevarez  MRN: [id number]  Department: [facility] [id number]  Room: [address] [place] [address]  Today's Date: 3/25/2025   Time Calculation  Start Time: 1255  Stop Time: 1325  Time Calculation (min): 30 min    Assessment/Plan   PT Assessment  PT Assessment Results: Decreased strength, Decreased endurance, Impaired balance, Decreased mobility, Pain  Rehab Prognosis: Good  Barriers to Discharge Home: No anticipated barriers  Evaluation/Treatment Tolerance: Patient limited by fatigue, Patient limited by pain  Medical Staff Made Aware: Yes  Strengths: Ability to acquire knowledge, Attitude of self, Access to adaptive/assistive products, Housing layout, Premorbid level of function  Barriers to Participation: Comorbidities  End of Session Communication: Bedside nurse  Assessment Comment: Pt. is a 64 yof that presents with impairments including increased chest/back pain, decreased functional strength, decreased activity tolerance/endurance, impaired balance, and increased difficulty with functional mobility compared to baseline level of function. Pt. will benefit from skilled PT intervention while inpatient to address the above deficits and maximize return to PLOF. Anticipate pt will benefit from LOW intensity PT upon discharge.  End of Session Patient Position: Up in chair, Alarm off, caregiver present     IP OR SWING BED PT PLAN  Inpatient or Swing Bed: Inpatient  PT Plan  Treatment/Interventions: Bed mobility, Transfer training, Gait training, Stair training, Balance training, Strengthening, Endurance training, Therapeutic exercise, Therapeutic activity, Home exercise program  PT Plan: Ongoing PT  PT Frequency: 3 times per week  PT Discharge Recommendations: Low intensity level of continued care  Equipment Recommended upon Discharge:  (Pt has adequate DME)  PT Recommended Transfer Status: Assist x1, Assistive device  PT - OK to Discharge: Yes      Subjective      General Visit Information:  General  Reason for Referral: S/p Bronchoscopy, Robotic right upper lobectomy, Mediastinal rocael dissection, Lysis of adhesions on 3/24  Past Medical History Relevant to Rehab: ex-smoker who quit in 11/2024 with a 75-pack-year history who presents with 1.4 cm right upper lobe nodule suspicious for malignancy.  Family/Caregiver Present: No  Co-Treatment: OT  Co-Treatment Reason: To maximize pt safety and therapeutic potential. Per RN, pt unable to tolerate upright position 2/2 dizziness and requires x2 assist for line management  Prior to Session Communication: Bedside nurse  Patient Position Received: Bed, 3 rail up, Alarm off, not on at start of session  Preferred Learning Style: auditory, verbal  General Comment: Pt received supine in bed, agreeable to participate in session    Home Living:  Home Living  Type of Home: House  Lives With: Spouse  Home Adaptive Equipment: Walker rolling or standard, Cane  Home Layout: Two level, Laundry main level, Full bath main level, Bed/bath upstairs  Home Access: Stairs to enter with rails  Entrance Stairs-Rails: Both  Entrance Stairs-Number of Steps: 1 (from garagae)  Bathroom Shower/Tub: Tub/shower unit, Walk-in shower  Bathroom Equipment: Shower chair with back    Prior Level of Function:  Prior Function Per Pt/Caregiver Report  Level of [place]: Independent with ADLs and functional transfers, Independent with homemaking with ambulation  ADL Assistance: Independent  Homemaking Assistance: Independent  Ambulatory Assistance: Independent (no AD, denied falls)  Hand Dominance: Right  Prior Function Comments: +drives though spouse mainly does    Precautions:  Precautions  Hearing/Visual Limitations: +glasses  Medical Precautions: Fall precautions, Oxygen therapy device and L/min, Chest tube (2L O2 NC)       03/25/25 1256   Vital Signs   Vitals Session Pre PT   Heart Rate 56   Heart Rate Source Monitor   SpO2 96 %   /68   BP Location Left  arm   BP Method Automatic   Patient Position Sitting   Vital Signs Comment VSS throughout session. SpO2 > 92% throughout functional mobility   Vital Signs Comment: VSS throughout session. SpO2 > 92% throughout functional mobility    Objective   Pain:  Pain Assessment  Pain Assessment: 0-10  0-10 (Numeric) Pain Score: 10 - Worst possible pain  Pain Type: Surgical pain  Pain Location: Chest  Pain Interventions: Repositioned, Ambulation/increased activity    Cognition:  Cognition  Overall Cognitive Status: Within Functional Limits  Orientation Level: Oriented X4    General Assessments:  Activity Tolerance  Endurance: Decreased tolerance for upright activites  Early Mobility/Exercise Safety Screen: Proceed with mobilization - No exclusion criteria met  Activity Tolerance Comments: Limited by pain. Pt endorsing dizziness throughout functional mobility though VSS    Sensation  Light Touch: No apparent deficits    Postural Control  Postural Control: Within Functional Limits    Static Sitting Balance  Static Sitting-Balance Support: Feet supported, Bilateral upper extremity supported  Static Sitting-Level of Assistance: Close supervision  Dynamic Sitting Balance  Dynamic Sitting-Balance Support: Feet supported  Dynamic Sitting-Level of Assistance: Close supervision    Static Standing Balance  Static Standing-Balance Support: Bilateral upper extremity supported  Static Standing-Level of Assistance: Contact guard  Dynamic Standing Balance  Dynamic Standing-Balance Support: Bilateral upper extremity supported  Dynamic Standing-Level of Assistance: Contact guard    Functional Assessments:  Bed Mobility  Bed Mobility: Yes  Bed Mobility 1  Bed Mobility 1: Supine to sitting  Level of Assistance 1: Minimum assistance, Minimal verbal cues  Bed Mobility Comments 1: HOB elevated maximally  Bed Mobility 2  Bed Mobility  2: Scooting  Level of Assistance 2: Moderate assistance, Minimal verbal cues  Bed Mobility Comments 2: Increased  assist to scoot hips towards EOB, use of draw sheet    Transfers  Transfer: Yes  Transfer 1  Transfer From 1: Sit to, Stand to  Transfer to 1: Stand, Sit  Technique 1: Sit to stand, Stand to sit  Transfer Device 1: Walker  Transfer Level of Assistance 1: Minimum assistance, Minimal verbal cues  Trials/Comments 1: x1 trial. Cues for proper UE placement and technique. Demos decreased eccentric control with descent    Ambulation/Gait Training  Ambulation/Gait Training Performed: Yes  Ambulation/Gait Training 1  Surface 1: Level tile  Device 1: Rolling walker  Assistance 1: Contact guard, Minimal verbal cues  Quality of Gait 1: Decreased step length, Forward flexed posture, Narrow base of support (slow sea)  Comments/Distance (ft) 1: 10 ft x2 trials; seated rest between trials    Stairs  Stairs: No    Extremity/Trunk Assessments:  RLE   RLE : Within Functional Limits  LLE   LLE : Within Functional Limits    Treatments:  Therapeutic Activity  Therapeutic Activity Performed: Yes  Therapeutic Activity 1: Skillful monitoring of vitals thorughout session to ensure safety with continued participation.  Therapeutic Activity 2: Pt completed dynamic standing activities in bathroom, ~5 min total with CGAx1 for balance and intermittent UE support.    Ambulation/Gait Training  Ambulation/Gait Training Performed: Yes  Ambulation/Gait Training 2  Surface 2: Level tile  Device 2: Rolling walker  Assistance 2: Contact guard, Minimal verbal cues  Quality of Gait 2: Decreased step length, Forward flexed posture, Narrow base of support (slow sea)  Comments/Distance (ft) 2: 20 ft    Transfers  Transfer: Yes  Transfers 2  Transfer From 2: Sit to, Stand to  Transfer to 2: Stand, Sit  Technique 2: Sit to stand, Stand to sit  Transfer Device 2: Walker  Transfer Level of Assistance 2: Contact guard, Minimal verbal cues  Trials/Comments 2: x2 trials from chair and toilet. Cues for proper UE placement and technique    Outcome  Measures:  [facility] Basic Mobility  Turning from your back to your side while in a flat bed without using bedrails: A little  Moving from lying on your back to sitting on the side of a flat bed without using bedrails: A lot  Moving to and from bed to chair (including a wheelchair): A little  Standing up from a chair using your arms (e.g. wheelchair or bedside chair): A little  To walk in hospital room: A little  Climbing 3-5 steps with railing: A lot  Basic Mobility - Total Score: 16    Encounter Problems       Encounter Problems (Active)       Balance       Patient to demonstrate Linda static and dynamic standing balance without LOB with change of directions, no hesitancy, appropriate SARAHY and sequencing to complete functional task.        Start:  03/25/25    Expected End:  04/08/25               Mobility       STG - Patient will ambulate >/= 200 ft Linda with LRAD and no acute LOB       Start:  03/25/25    Expected End:  04/08/25            Patient to ascend/descend >/= 2 stairs with HR and CGAx1 to demo ability to safely traverse SAMANTHA        Start:  03/25/25    Expected End:  04/08/25               PT Transfers       STG - Patient will perform bed mobility IND       Start:  03/25/25    Expected End:  04/08/25            STG - Patient will transfer sit to and from stand Linda with LRAD       Start:  03/25/25    Expected End:  04/08/25               Pain - Adult              Education Documentation  Precautions, taught by Jessica Mcarthur, PT at 3/25/2025  3:07 PM.  Learner: Patient  Readiness: Acceptance  Method: Explanation, Demonstration  Response: Verbalizes Understanding, Demonstrated Understanding  Comment: PLB, activity pacing, transfer techniques    Body Mechanics, taught by Jessica Mcarthur, PT at 3/25/2025  3:07 PM.  Learner: Patient  Readiness: Acceptance  Method: Explanation, Demonstration  Response: Verbalizes Understanding, Demonstrated Understanding  Comment: PLB, activity pacing, transfer  techniques    Mobility Training, taught by Jessica Mcarthur, PT at 3/25/2025  3:07 PM.  Learner: Patient  Readiness: Acceptance  Method: Explanation, Demonstration  Response: Verbalizes Understanding, Demonstrated Understanding  Comment: PLB, activity pacing, transfer techniques    Education Comments  No comments found.        03/25/25 at 3:12 PM - Jessica Mcarthur, PT

## 2025-03-25 NOTE — ASSESSMENT & PLAN NOTE
"Princess Nevarez \"Edna\" is a 64 y.o. female with a PMHX significant for HTN, GERD, generalized anxiety disorder, PONV, insomina presenting with a 1.4 cm spiculated nodule in her right upper lobe with an SUV max of 10.9.  Biopsy proven on EBUS 2/26/2025 NSCLC favoring adenocarcinoma. She underwent a right robotic assisted upper lobectomy with mediastinal lymph node dissection on 3/24/2025    3/25: frias removed MN, chest tube placed to WS     "

## 2025-03-25 NOTE — PROGRESS NOTES
"Princess Nevarez \"Collin" is a 64 y.o. female on day 1 of admission presenting with Malignant neoplasm of upper lobe of right lung (Multi).    3/24 Dr Dos Santos  Bronchoscopy  Robotic right upper lobectomy  Mediastinal rocael dissection  Lysis of adhesions       Subjective   Reports nausea, pain at surgical site  Rubio removed MN DTV  Bladder scan ~100ml       Objective     Physical Exam  Vitals reviewed.   Constitutional:       General: She is not in acute distress.     Appearance: Normal appearance. She is normal weight. She is not toxic-appearing.   Cardiovascular:      Rate and Rhythm: Normal rate and regular rhythm.      Pulses: Normal pulses.      Heart sounds: Normal heart sounds. No murmur heard.  Pulmonary:      Effort: Pulmonary effort is normal. No respiratory distress.      Breath sounds: Normal breath sounds. No wheezing.      Comments: 100cc SS intermittent chamber 1 airleak   Chest:      Chest wall: No tenderness.   Abdominal:      General: Abdomen is flat. Bowel sounds are normal.      Palpations: Abdomen is soft.   Musculoskeletal:         General: Normal range of motion.      Cervical back: Normal range of motion and neck supple. No rigidity or tenderness.   Skin:     General: Skin is warm and dry.   Neurological:      General: No focal deficit present.      Mental Status: She is alert and oriented to person, place, and time.   Psychiatric:         Mood and Affect: Mood normal.         Behavior: Behavior normal.         Last Recorded Vitals  Blood pressure 149/74, pulse 58, temperature 36 °C (96.8 °F), temperature source Temporal, resp. rate 20, height 1.727 m (5' 8\"), weight 60.2 kg (132 lb 12.8 oz), SpO2 95%.  Intake/Output last 3 Shifts:  I/O last 3 completed shifts:  In: 2198.3 (36.5 mL/kg) [I.V.:98.3 (1.6 mL/kg); IV Piggyback:2100]  Out: 774 (12.8 mL/kg) [Urine:690 (0.3 mL/kg/hr); Chest Tube:84]  Weight: 60.2 kg     Relevant Results       Lab Results   Component Value Date    WBC 15.2 (H) " "03/25/2025    HGB 11.0 (L) 03/25/2025    HCT 34.6 (L) 03/25/2025     (H) 03/25/2025     03/25/2025       BMP and Mg pending    CXR shows expanded lung with minimal apical PTX       Assessment/Plan   Assessment & Plan  Malignant neoplasm of upper lobe of right lung (Multi)  Princess Nevarez \"Edna\" is a 64 y.o. female with a PMHX significant for HTN, GERD, generalized anxiety disorder, PONV, insomina presenting with a 1.4 cm spiculated nodule in her right upper lobe with an SUV max of 10.9.  Biopsy proven on EBUS 2/26/2025 NSCLC favoring adenocarcinoma. She underwent a right robotic assisted upper lobectomy with mediastinal lymph node dissection on 3/24/2025    3/25: frias removed MN, chest tube placed to WS     Plan:  NEURO:   - ongoing neuro/pain assessments  - Discontinue PCA. Continue with APAP. Start Ultram, monitor effectiveness and adjust dose as needed   - bowel regimen while on narcotics   - PT/OT following   - Home meds: ambien (hold), paxil     CV:   - NSR on telemetry   - continuous telemetry  monitoring and VS every 4 hrs   - volume resuscitate as clinically indicated  - continue Metoprolol 12.5 mg BID, adjust dose as needed. Hold for HR <60  - replace electrolytes as needed   - Home meds: Metoprolol succ 25mg daily (not ordered), Norvasc (not ordered)  - anticoagulation : n/a     PULM:  CXR shows well expanded lung with minial apical space on -10sxn with intermittent airleak   - oxygenating well on 2L NC, wean as tolerated   - Q1h incentive spirometry while awake  - daily AM CXR  - Chest tube 100cc output SS camber 1 intermittent airleak   - cough and deep breath encouraged - RT consulted  - Duonebs PRN  - Home trelegy substituted for Spirivia and Breo      GI:    - Post op Nausea- discontinue PCA. Zofran 4mg q8h PRN. Compazine q6h PRN  - Diet: CLD for AM, will reasses   - PPI for GI prophylaxis     :   - Check renal function panel post op and daily. pending  - DTV - encourage PO " fluids and ambulate  - Replete electrolytes to goal K>4, Mg>2     HEME: OR EBL 150ml  - Check CBC daily and coags prn  - SC Heparin and SCDs for DVT prophylaxis  - Ongoing monitoring for s/s bleeding  - Home meds: n/a  - anticoagulation : n/a     ENDO:   - BG control: no underlying glucose issues     ID: Afebrile, no leukocytosis   - trend temp q4, wbc daily  - chavo-op ATB completed   - ongoing monitoring for s/s infection     Disposition:  -Plan for discharge to home once stable from a surgical standpoint  -Continue to assess for home-going needs      Patient seen and examined by this provider. Plan of care discussed with attending, Dr. Levon Navas PA-C  Thoracic Surgery  Pager [phone]

## 2025-03-26 ENCOUNTER — APPOINTMENT (OUTPATIENT)
Dept: RADIOLOGY | Facility: HOSPITAL | Age: 65
End: 2025-03-26
Payer: MEDICARE

## 2025-03-26 LAB
ANION GAP SERPL CALC-SCNC: 13 MMOL/L (ref 10–20)
BUN SERPL-MCNC: 17 MG/DL (ref 6–23)
CALCIUM SERPL-MCNC: 8.3 MG/DL (ref 8.6–10.6)
CHLORIDE SERPL-SCNC: 100 MMOL/L (ref 98–107)
CO2 SERPL-SCNC: 28 MMOL/L (ref 21–32)
CREAT SERPL-MCNC: 0.59 MG/DL (ref 0.5–1.05)
EGFRCR SERPLBLD CKD-EPI 2021: >90 ML/MIN/1.73M*2
ERYTHROCYTE [DISTWIDTH] IN BLOOD BY AUTOMATED COUNT: 13 % (ref 11.5–14.5)
GLUCOSE SERPL-MCNC: 112 MG/DL (ref 74–99)
HCT VFR BLD AUTO: 32.1 % (ref 36–46)
HGB BLD-MCNC: 10.2 G/DL (ref 12–16)
MAGNESIUM SERPL-MCNC: 2.25 MG/DL (ref 1.6–2.4)
MCH RBC QN AUTO: 33.2 PG (ref 26–34)
MCHC RBC AUTO-ENTMCNC: 31.8 G/DL (ref 32–36)
MCV RBC AUTO: 105 FL (ref 80–100)
NRBC BLD-RTO: 0 /100 WBCS (ref 0–0)
PLATELET # BLD AUTO: 282 X10*3/UL (ref 150–450)
POTASSIUM SERPL-SCNC: 4.1 MMOL/L (ref 3.5–5.3)
RBC # BLD AUTO: 3.07 X10*6/UL (ref 4–5.2)
SODIUM SERPL-SCNC: 137 MMOL/L (ref 136–145)
WBC # BLD AUTO: 10.7 X10*3/UL (ref 4.4–11.3)

## 2025-03-26 PROCEDURE — 71045 X-RAY EXAM CHEST 1 VIEW: CPT

## 2025-03-26 PROCEDURE — 2500000005 HC RX 250 GENERAL PHARMACY W/O HCPCS: Performed by: PHYSICIAN ASSISTANT

## 2025-03-26 PROCEDURE — 2500000001 HC RX 250 WO HCPCS SELF ADMINISTERED DRUGS (ALT 637 FOR MEDICARE OP)

## 2025-03-26 PROCEDURE — 2500000001 HC RX 250 WO HCPCS SELF ADMINISTERED DRUGS (ALT 637 FOR MEDICARE OP): Performed by: PHYSICIAN ASSISTANT

## 2025-03-26 PROCEDURE — 2500000004 HC RX 250 GENERAL PHARMACY W/ HCPCS (ALT 636 FOR OP/ED): Performed by: PHYSICIAN ASSISTANT

## 2025-03-26 PROCEDURE — 85027 COMPLETE CBC AUTOMATED: CPT | Performed by: PHYSICIAN ASSISTANT

## 2025-03-26 PROCEDURE — 36415 COLL VENOUS BLD VENIPUNCTURE: CPT | Performed by: PHYSICIAN ASSISTANT

## 2025-03-26 PROCEDURE — 82374 ASSAY BLOOD CARBON DIOXIDE: CPT | Performed by: PHYSICIAN ASSISTANT

## 2025-03-26 PROCEDURE — 99232 SBSQ HOSP IP/OBS MODERATE 35: CPT | Performed by: NURSE PRACTITIONER

## 2025-03-26 PROCEDURE — 1200000002 HC GENERAL ROOM WITH TELEMETRY DAILY

## 2025-03-26 PROCEDURE — 83735 ASSAY OF MAGNESIUM: CPT | Performed by: PHYSICIAN ASSISTANT

## 2025-03-26 PROCEDURE — 94640 AIRWAY INHALATION TREATMENT: CPT

## 2025-03-26 PROCEDURE — 97530 THERAPEUTIC ACTIVITIES: CPT | Mod: GP

## 2025-03-26 PROCEDURE — 2500000001 HC RX 250 WO HCPCS SELF ADMINISTERED DRUGS (ALT 637 FOR MEDICARE OP): Performed by: NURSE PRACTITIONER

## 2025-03-26 RX ORDER — AMLODIPINE BESYLATE 5 MG/1
5 TABLET ORAL DAILY
Status: DISCONTINUED | OUTPATIENT
Start: 2025-03-26 | End: 2025-03-27 | Stop reason: HOSPADM

## 2025-03-26 RX ORDER — TRAMADOL HYDROCHLORIDE 50 MG/1
100 TABLET ORAL EVERY 6 HOURS PRN
Status: DISCONTINUED | OUTPATIENT
Start: 2025-03-26 | End: 2025-03-27 | Stop reason: HOSPADM

## 2025-03-26 RX ORDER — TRAMADOL HYDROCHLORIDE 50 MG/1
50 TABLET ORAL EVERY 6 HOURS PRN
Status: DISCONTINUED | OUTPATIENT
Start: 2025-03-26 | End: 2025-03-27 | Stop reason: HOSPADM

## 2025-03-26 RX ORDER — TRAMADOL HYDROCHLORIDE 50 MG/1
50 TABLET ORAL EVERY 6 HOURS PRN
Status: DISCONTINUED | OUTPATIENT
Start: 2025-03-26 | End: 2025-03-26

## 2025-03-26 RX ORDER — IBUPROFEN 600 MG/1
600 TABLET ORAL EVERY 6 HOURS SCHEDULED
Status: DISCONTINUED | OUTPATIENT
Start: 2025-03-26 | End: 2025-03-27 | Stop reason: HOSPADM

## 2025-03-26 RX ADMIN — HEPARIN SODIUM 5000 UNITS: 5000 INJECTION, SOLUTION INTRAVENOUS; SUBCUTANEOUS at 10:20

## 2025-03-26 RX ADMIN — ACETAMINOPHEN 650 MG: 325 TABLET, FILM COATED ORAL at 03:45

## 2025-03-26 RX ADMIN — LIDOCAINE 4% 1 PATCH: 40 PATCH TOPICAL at 16:04

## 2025-03-26 RX ADMIN — TRAMADOL HYDROCHLORIDE 100 MG: 50 TABLET, COATED ORAL at 10:18

## 2025-03-26 RX ADMIN — ONDANSETRON 4 MG: 2 INJECTION INTRAMUSCULAR; INTRAVENOUS at 10:55

## 2025-03-26 RX ADMIN — TIOTROPIUM BROMIDE INHALATION SPRAY 2 PUFF: 3.12 SPRAY, METERED RESPIRATORY (INHALATION) at 11:02

## 2025-03-26 RX ADMIN — KETOROLAC TROMETHAMINE 15 MG: 15 INJECTION, SOLUTION INTRAMUSCULAR; INTRAVENOUS at 06:50

## 2025-03-26 RX ADMIN — KETOROLAC TROMETHAMINE 15 MG: 15 INJECTION, SOLUTION INTRAMUSCULAR; INTRAVENOUS at 01:04

## 2025-03-26 RX ADMIN — IBUPROFEN 600 MG: 600 TABLET, FILM COATED ORAL at 17:45

## 2025-03-26 RX ADMIN — ACETAMINOPHEN 650 MG: 325 TABLET, FILM COATED ORAL at 21:27

## 2025-03-26 RX ADMIN — FLUTICASONE FUROATE AND VILANTEROL TRIFENATATE 1 PUFF: 100; 25 POWDER RESPIRATORY (INHALATION) at 11:03

## 2025-03-26 RX ADMIN — PANTOPRAZOLE SODIUM 40 MG: 40 TABLET, DELAYED RELEASE ORAL at 10:17

## 2025-03-26 RX ADMIN — ACETAMINOPHEN 650 MG: 325 TABLET, FILM COATED ORAL at 16:03

## 2025-03-26 RX ADMIN — AMLODIPINE BESYLATE 5 MG: 5 TABLET ORAL at 16:05

## 2025-03-26 RX ADMIN — SENNOSIDES AND DOCUSATE SODIUM 2 TABLET: 50; 8.6 TABLET ORAL at 10:17

## 2025-03-26 RX ADMIN — TRAMADOL HYDROCHLORIDE 50 MG: 50 TABLET, COATED ORAL at 03:35

## 2025-03-26 RX ADMIN — METOPROLOL TARTRATE 12.5 MG: 25 TABLET, FILM COATED ORAL at 21:27

## 2025-03-26 RX ADMIN — PAROXETINE 10 MG: 10 TABLET, FILM COATED ORAL at 21:27

## 2025-03-26 RX ADMIN — ACETAMINOPHEN 650 MG: 325 TABLET, FILM COATED ORAL at 10:17

## 2025-03-26 RX ADMIN — HEPARIN SODIUM 5000 UNITS: 5000 INJECTION, SOLUTION INTRAVENOUS; SUBCUTANEOUS at 17:49

## 2025-03-26 RX ADMIN — HYDROXYZINE HYDROCHLORIDE 10 MG: 10 TABLET ORAL at 17:45

## 2025-03-26 RX ADMIN — TRAMADOL HYDROCHLORIDE 50 MG: 50 TABLET, COATED ORAL at 21:27

## 2025-03-26 RX ADMIN — HEPARIN SODIUM 5000 UNITS: 5000 INJECTION, SOLUTION INTRAVENOUS; SUBCUTANEOUS at 01:04

## 2025-03-26 RX ADMIN — SODIUM CHLORIDE, POTASSIUM CHLORIDE, SODIUM LACTATE AND CALCIUM CHLORIDE 50 ML/HR: 600; 310; 30; 20 INJECTION, SOLUTION INTRAVENOUS at 04:37

## 2025-03-26 ASSESSMENT — PAIN SCALES - GENERAL
PAINLEVEL_OUTOF10: 7
PAINLEVEL_OUTOF10: 4
PAINLEVEL_OUTOF10: 5 - MODERATE PAIN
PAINLEVEL_OUTOF10: 3
PAINLEVEL_OUTOF10: 7
PAINLEVEL_OUTOF10: 8
PAINLEVEL_OUTOF10: 9
PAINLEVEL_OUTOF10: 7
PAINLEVEL_OUTOF10: 9
PAINLEVEL_OUTOF10: 8

## 2025-03-26 ASSESSMENT — COGNITIVE AND FUNCTIONAL STATUS - GENERAL
MOVING FROM LYING ON BACK TO SITTING ON SIDE OF FLAT BED WITH BEDRAILS: A LITTLE
TURNING FROM BACK TO SIDE WHILE IN FLAT BAD: A LITTLE
PERSONAL GROOMING: A LITTLE
TURNING FROM BACK TO SIDE WHILE IN FLAT BAD: A LITTLE
DRESSING REGULAR UPPER BODY CLOTHING: A LITTLE
MOBILITY SCORE: 18
WALKING IN HOSPITAL ROOM: A LITTLE
CLIMB 3 TO 5 STEPS WITH RAILING: A LITTLE
WALKING IN HOSPITAL ROOM: A LITTLE
DRESSING REGULAR LOWER BODY CLOTHING: A LITTLE
HELP NEEDED FOR BATHING: A LITTLE
MOBILITY SCORE: 17
MOVING TO AND FROM BED TO CHAIR: A LITTLE
TOILETING: A LITTLE
MOVING TO AND FROM BED TO CHAIR: A LITTLE
STANDING UP FROM CHAIR USING ARMS: A LITTLE
STANDING UP FROM CHAIR USING ARMS: A LITTLE
DAILY ACTIVITIY SCORE: 19
CLIMB 3 TO 5 STEPS WITH RAILING: A LOT
MOVING FROM LYING ON BACK TO SITTING ON SIDE OF FLAT BED WITH BEDRAILS: A LITTLE

## 2025-03-26 ASSESSMENT — PAIN DESCRIPTION - ORIENTATION
ORIENTATION: POSTERIOR
ORIENTATION: RIGHT

## 2025-03-26 ASSESSMENT — PAIN DESCRIPTION - LOCATION
LOCATION: NECK
LOCATION: INCISION

## 2025-03-26 ASSESSMENT — PAIN - FUNCTIONAL ASSESSMENT
PAIN_FUNCTIONAL_ASSESSMENT: 0-10

## 2025-03-26 ASSESSMENT — PAIN DESCRIPTION - DESCRIPTORS: DESCRIPTORS: ACHING

## 2025-03-26 NOTE — PROGRESS NOTES
"Thoracic Surgery Progress Note  3/26/2025    Princess Nevarez is a 64 y.o. female with a history of HTN, GERD, generalized anxiety disorder, PONV, insomina presenting with a 1.4 cm spiculated nodule in her right upper lobe with an SUV max of 10.9.  Biopsy proven on EBUS 2/26/2025 NSCLC favoring adenocarcinoma.   She is now 2 Days Post-Op status post:  Bronchoscopy  Robotic right upper lobectomy  Mediastinal rocael dissection  Lysis of adhesions    Overnight issues: Ongoing mild nausea with increased c/o pain.  Voiding.  Requiring 2L overnight, this am weaned to room air.     Physical Exam:  General: She is a pleasant female currently in mild distress 2/2 pain complaints.  Visit Vitals  /85 (BP Location: Left arm, Patient Position: Sitting)   Pulse 58   Temp 36.2 °C (97.2 °F) (Temporal)   Resp 18   Ht 1.727 m (5' 8\")   Wt 55.2 kg (121 lb 11.2 oz)   SpO2 96%   BMI 18.50 kg/m²   OB Status Postmenopausal   Smoking Status Former   BSA 1.63 m²     Body mass index is 18.5 kg/m².   HEENT: Normocephalic and atraumatic.   NECK: Supple. Trach midline. No JVD.   CHEST: Breathing comfortably on 2L-->RA.  Chest tube without airleak, minimal drainage. Removed on rounds. Occlusive dressing applied.   HEART: Regular rate and rhythm. NSR per tele review.   ABDOMEN: Soft, flat, nontender.   : voiding  NEUROLOGIC: Alert and oriented. Grossly intact.   EXTREMITIES: Moves all extremities equally.  Pedal pulses are palpable. No lower extremity edema. No calf tenderness.     Diagnostics:     Intake/Output Summary (Last 24 hours) at 3/26/2025 1231  Last data filed at 3/26/2025 0941  Gross per 24 hour   Intake 1214.17 ml   Output 1060 ml   Net 154.17 ml     Results from last 7 days   Lab Units 03/26/25  0631 03/25/25  0702   WBC AUTO x10*3/uL 10.7 15.2*   HEMOGLOBIN g/dL 10.2* 11.0*   HEMATOCRIT % 32.1* 34.6*   PLATELETS AUTO x10*3/uL 282 328     Results from last 7 days   Lab Units 03/26/25  0631 03/25/25  0702   SODIUM mmol/L 137 " 140   POTASSIUM mmol/L 4.1 4.3   CHLORIDE mmol/L 100 104   CO2 mmol/L 28 24   BUN mg/dL 17 16   CREATININE mg/dL 0.59 0.59   GLUCOSE mg/dL 112* 130*   CALCIUM mg/dL 8.3* 8.4*     Results from last 7 days   Lab Units 03/26/25  0631 03/25/25  0702   SODIUM mmol/L 137 140   POTASSIUM mmol/L 4.1 4.3   CHLORIDE mmol/L 100 104   CO2 mmol/L 28 24   BUN mg/dL 17 16   CREATININE mg/dL 0.59 0.59   GLUCOSE mg/dL 112* 130*   CALCIUM mg/dL 8.3* 8.4*     Scheduled medications  acetaminophen, 650 mg, oral, q6h  tiotropium, 2 puff, inhalation, Daily   And  fluticasone furoate-vilanteroL, 1 puff, inhalation, Daily  heparin, 5,000 Units, subcutaneous, q8h  ibuprofen, 600 mg, oral, q6h GIANLUCA  lidocaine, 1 patch, transdermal, q24h  metoprolol tartrate, 12.5 mg, oral, BID  pantoprazole, 40 mg, oral, Daily  PARoxetine, 10 mg, oral, Nightly  sennosides-docusate sodium, 2 tablet, oral, BID      Continuous medications     PRN medications  PRN medications: hydrOXYzine HCL, ipratropium-albuteroL, melatonin, naloxone, naloxone, [DISCONTINUED] ondansetron **OR** ondansetron, oxygen, [DISCONTINUED] prochlorperazine **OR** prochlorperazine **OR** [DISCONTINUED] prochlorperazine, traMADol, traMADol, [Held by provider] zolpidem    Imaging:   AM CXR reviewed. Expected post op changes and chest tube placement. No clinically significant pneumothorax. No formal report at this time.   Post pull imaging also personally reviewed and is without evidence of clinically significant pneumothorax.     Assessment:  Princess Nevarez is a 64 y.o. female with a past medical history   significant for HTN, GERD, generalized anxiety disorder, PONV, insomina presenting with a 1.4 cm spiculated nodule in her right upper lobe with an SUV max of 10.9.  Biopsy proven on EBUS 2/26/2025 NSCLC favoring adenocarcinoma. She underwent a right robotic assisted upper lobectomy with mediastinal lymph node dissection on 3/24/2025     3/25: frias removed MN, chest tube placed to WS    3/26: voiding. Weaned to RA.  Chest tube removed. Post pull imaging satisfactory.      Plan:  NEURO:  hx CON  - ongoing neuro/pain assessments  -  Continue with APAP. Increase Ultram (chosen for c/o nausea?), monitor effectiveness and adjust dose as needed   - bowel regimen while on narcotics   - PT/OT following   - Home meds: ambien (hold), paxil     CV:  hx HTN  - NSR on telemetry   - continuous telemetry  monitoring and VS every 4 hrs   - volume resuscitate as clinically indicated  - continue Metoprolol 12.5 mg BID, adjust dose as needed. Hold for HR <60  - replace electrolytes as needed   - Home meds: Metoprolol succ 25mg daily (not ordered), Norvasc (not ordered)  - anticoagulation : n/a     PULM:  CXR shows well expanded lung with minial apical space on -10sxn with intermittent airleak pod #1.  CT removed pod #2.   - oxygenating well on 2L NC, wean as tolerated   - Q1h incentive spirometry while awake  - daily AM CXR  - cough and deep breath encouraged - RT consulted 3/25  - Duonebs PRN  - Home trelegy substituted for Spirivia and Breo      GI:  hx GERD   - Post op Nausea w/o vomiting- discontinue PCA. Zofran 4mg q8h PRN. Compazine q6h PRN  - Diet: advance to bland regular diet if tolerated   - PPI for GI prophylaxis     :   - Check renal function panel post op and daily.   - voiding - encourage PO fluids and ambulate  - Replete electrolytes to goal K>4, Mg>2     HEME: OR EBL 150ml  - Check CBC daily and coags prn  - SC Heparin and SCDs for DVT prophylaxis  - Ongoing monitoring for s/s bleeding  - Home meds: n/a  - anticoagulation : n/a     ENDO:  no underlying glucose issues     ID: Afebrile, no leukocytosis   - trend temp q4, wbc daily  - chavo-op ATB completed   - ongoing monitoring for s/s infection     Disposition:  -Plan for discharge to home once stable from a surgical standpoint--likely Thursday AM.   -Continue to assess for home-going needs      Patient seen and examined by this provider. Plan of  care discussed with attending, Dr. Levon Espinoza, APRN-CNP

## 2025-03-26 NOTE — PROGRESS NOTES
Patient was 94% on room air patient ambulated on unit and dropped to 87% 2L was applied patient was 93% and remained on 2L.

## 2025-03-26 NOTE — PROGRESS NOTES
"Physical Therapy    Physical Therapy Treatment    Patient Name: Princess Nevarez \"Edna\"  MRN: [id number]  Department: [place] [address]  Room: [address] [place], [place] [address]  Today's Date: 3/26/2025  Time Calculation  Start Time: 1025  Stop Time: 1049  Time Calculation (min): 24 min         Assessment/Plan   PT Assessment  PT Assessment Results: Decreased strength, Decreased endurance, Impaired balance, Decreased mobility, Pain  Rehab Prognosis: Good  Barriers to Discharge Home: No anticipated barriers  Evaluation/Treatment Tolerance: Patient limited by pain  Medical Staff Made Aware: Yes  Strengths: Ability to acquire knowledge, Attitude of self, Access to adaptive/assistive products, Housing layout, Premorbid level of function  Barriers to Participation: Comorbidities  End of Session Communication: Bedside nurse  Assessment Comment: Pt. is progressing appropriately, tolerating multiple transfers and ambulating increased distance with FWW and CGAx1. Pt continues to demo decreased functional strength, decreased activity tolerance/endurance, impaired balance, and increased difficulty with functional mobility compared to baseline. Pt. will continue to benefit from skilled PT intervention while inpatient to address deficits and maximize return to PLOF. Pt remains appropriate for LOW intensity PT upon discharge.  End of Session Patient Position: Bed, 3 rail up, Alarm on     PT Plan  Treatment/Interventions: Bed mobility, Transfer training, Gait training, Stair training, Balance training, Strengthening, Endurance training, Therapeutic exercise, Therapeutic activity, Home exercise program  PT Plan: Ongoing PT  PT Frequency: 3 times per week  PT Discharge Recommendations: Low intensity level of continued care  Equipment Recommended upon Discharge:  (Pt has adequate DME)  PT Recommended Transfer Status: Assist x1, Assistive device  PT - OK to Discharge: Yes      General Visit Information:   PT  Visit  PT Received On: 03/26/25  Response to Previous " Treatment: Patient with no complaints from previous session.  General  Reason for Referral: S/p Bronchoscopy, Robotic right upper lobectomy, Mediastinal rocael dissection, Lysis of adhesions on 3/24  Past Medical History Relevant to Rehab: ex-smoker who quit in 11/2024 with a 75-pack-year history who presents with 1.4 cm right upper lobe nodule suspicious for malignancy.  Family/Caregiver Present: No  Co-Treatment: OT  Co-Treatment Reason: To maximize pt safety and therapeutic potential. Per RN, pt unable to tolerate upright position 2/2 dizziness and requires x2 assist for line management  Prior to Session Communication: Bedside nurse  Patient Position Received: Up in chair, Alarm off, not on at start of session  Preferred Learning Style: auditory, verbal  General Comment: Pt received seated in bedside chair, agreeable to participate in session. Pt appears anxious re: anticipation of pain.    Subjective   Precautions:  Precautions  Hearing/Visual Limitations: +glasses  Medical Precautions: Fall precautions, Oxygen therapy device and L/min, Chest tube    Objective   Pain:  Pain Assessment  Pain Assessment: 0-10  0-10 (Numeric) Pain Score: 8  Pain Type: Surgical pain  Pain Location: Back  Pain Interventions: Repositioned, Ambulation/increased activity    Cognition:  Cognition  Overall Cognitive Status: Within Functional Limits  Orientation Level: Oriented X4    Postural Control:  Postural Control  Postural Control: Within Functional Limits  Static Sitting Balance  Static Sitting-Balance Support: Feet supported  Static Sitting-Level of Assistance: Close supervision  Dynamic Sitting Balance  Dynamic Sitting-Balance Support: Feet supported  Dynamic Sitting-Level of Assistance: Close supervision  Static Standing Balance  Static Standing-Balance Support: Bilateral upper extremity supported  Static Standing-Level of Assistance: Contact guard  Dynamic Standing Balance  Dynamic Standing-Balance Support: Bilateral upper  extremity supported  Dynamic Standing-Level of Assistance: Contact guard    Extremity/Trunk Assessments:  RLE   RLE : Within Functional Limits  LLE   LLE : Within Functional Limits    Activity Tolerance:  Activity Tolerance  Endurance: Tolerates 10 - 20 min exercise with multiple rests  Early Mobility/Exercise Safety Screen: Proceed with mobilization - No exclusion criteria met  Activity Tolerance Comments: Limited by pain    Treatments:  Therapeutic Exercise  Therapeutic Exercise Performed: Yes  Therapeutic Exercise Activity 1: Seated in chair: APx20 cyn, LAQ x15 cyn    Therapeutic Activity  Therapeutic Activity Performed: Yes  Therapeutic Activity 1: Seated rest break provided between ambulation trials.  Therapeutic Activity 2: Pt completed dynamic standing activities in bathroom ~5 min total. CGAx1 with intermittent UE support for standing balance.  Therapeutic Activity 3: Cues for deep breathing and relaxation techniques as pt appears tense, endorsing muscle tension in neck/back. Educated pt on posterior shoulder rolls though pt unable to perform 2/2 pain    Bed Mobility  Bed Mobility: Yes  Bed Mobility 1  Bed Mobility 1: Sitting to supine  Level of Assistance 1: Contact guard, Minimal verbal cues  Bed Mobility Comments 1: HOB elevated, increased time to complete  Bed Mobility 2  Bed Mobility  2: Scooting  Level of Assistance 2: Moderate assistance, Minimal verbal cues  Bed Mobility Comments 2: Increased assist to scoot hips towards EOB, use of draw sheet    Ambulation/Gait Training  Ambulation/Gait Training Performed: Yes  Ambulation/Gait Training 1  Surface 1: Level tile  Device 1: Rolling walker  Assistance 1: Contact guard, Minimal verbal cues  Quality of Gait 1: Decreased step length, Forward flexed posture, Narrow base of support (slow sea, guarded posture)  Comments/Distance (ft) 1: 60 ft, 70 ft (Seated rest break between trials)  Ambulation/Gait Training 2  Surface 2: Level tile  Device 2: Rolling  walker  Assistance 2: Contact guard, Minimal verbal cues  Quality of Gait 2: Decreased step length, Forward flexed posture, Narrow base of support (slow sea)  Comments/Distance (ft) 2: 20 ft bathroom>chair    Transfers  Transfer: Yes  Transfer 1  Transfer From 1: Sit to, Stand to  Transfer to 1: Stand, Sit  Technique 1: Sit to stand, Stand to sit  Transfer Device 1: Walker  Transfer Level of Assistance 1: Contact guard, Minimal verbal cues  Trials/Comments 1: x3 trials from various surfaces. Cues for proper UE placement and technique. Pt needs reminded between reps, limited carry over.  Transfers 2  Transfer From 2: Sit to, Stand to  Transfer to 2: Stand, Sit  Technique 2: Sit to stand, Stand to sit  Transfer Device 2: Walker  Transfer Level of Assistance 2: Contact guard, Minimal verbal cues  Trials/Comments 2: x2 trials from chair and toilet. Cues for proper UE placement and technique    Stairs  Stairs: No    Outcome Measures:  [facility] Basic Mobility  Turning from your back to your side while in a flat bed without using bedrails: A little  Moving from lying on your back to sitting on the side of a flat bed without using bedrails: A little  Moving to and from bed to chair (including a wheelchair): A little  Standing up from a chair using your arms (e.g. wheelchair or bedside chair): A little  To walk in hospital room: A little  Climbing 3-5 steps with railing: A lot  Basic Mobility - Total Score: 17    Education Documentation  Precautions, taught by Jessica Mcarthur, PT at 3/26/2025 11:33 AM.  Learner: Patient  Readiness: Acceptance  Method: Explanation, Demonstration  Response: Verbalizes Understanding, Demonstrated Understanding  Comment: transfer techniques, activity pacing, PLB, relaxation techniques, ther ex    Body Mechanics, taught by Jessica Mcarthur, PT at 3/26/2025 11:33 AM.  Learner: Patient  Readiness: Acceptance  Method: Explanation, Demonstration  Response: Verbalizes Understanding, Demonstrated  Understanding  Comment: transfer techniques, activity pacing, PLB, relaxation techniques, ther ex    Mobility Training, taught by Jessica Mcarthur, PT at 3/26/2025 11:33 AM.  Learner: Patient  Readiness: Acceptance  Method: Explanation, Demonstration  Response: Verbalizes Understanding, Demonstrated Understanding  Comment: transfer techniques, activity pacing, PLB, relaxation techniques, ther ex    Education Comments  No comments found.        OP EDUCATION:       Encounter Problems       Encounter Problems (Active)       Balance       Patient to demonstrate Linda static and dynamic standing balance without LOB with change of directions, no hesitancy, appropriate SARAHY and sequencing to complete functional task.  (Progressing)       Start:  03/25/25    Expected End:  04/08/25               Mobility       STG - Patient will ambulate >/= 200 ft Linda with LRAD and no acute LOB (Progressing)       Start:  03/25/25    Expected End:  04/08/25            Patient to ascend/descend >/= 2 stairs with HR and CGAx1 to demo ability to safely traverse SAMANTHA  (Progressing)       Start:  03/25/25    Expected End:  04/08/25               PT Transfers       STG - Patient will perform bed mobility IND (Progressing)       Start:  03/25/25    Expected End:  04/08/25            STG - Patient will transfer sit to and from stand Linda with LRAD (Progressing)       Start:  03/25/25    Expected End:  04/08/25               Pain - Adult                03/26/25 at 11:35 AM - Jessica Mcarthur, PT

## 2025-03-26 NOTE — HOSPITAL COURSE
Princess Nevarez is a 64 y.o. female with a past medical history   significant for HTN, GERD, generalized anxiety disorder, PONV, insomina presenting with a 1.4 cm spiculated nodule in her right upper lobe with an SUV max of 10.9.  Biopsy proven on EBUS 2/26/2025 NSCLC favoring adenocarcinoma. She underwent a right robotic assisted upper lobectomy with mediastinal lymph node dissection on 3/24/2025     3/25: frias removed MN, chest tube placed to WS   3/26: voiding. Weaned to RA.  Chest tube removed. Post pull imaging satisfactory.

## 2025-03-26 NOTE — CARE PLAN
Problem: Pain - Adult  Goal: Verbalizes/displays adequate comfort level or baseline comfort level  Outcome: Progressing     Problem: Safety - Adult  Goal: Free from fall injury  3/26/2025 1254 by Vilma Valdes RN  Outcome: Progressing  3/26/2025 1254 by Vilma Valdes RN  Outcome: Progressing     Problem: Nutrition  Goal: Nutrient intake appropriate for maintaining nutritional needs  Outcome: Progressing     Problem: Skin  Goal: Decreased wound size/increased tissue granulation at next dressing change  3/26/2025 1254 by Vilma Valdes RN  Outcome: Progressing  3/26/2025 1254 by Vilma Valdes RN  Outcome: Progressing  Flowsheets (Taken 3/26/2025 1254)  Decreased wound size/increased tissue granulation at next dressing change:   Protective dressings over bony prominences   Promote sleep for wound healing   The patient's goals for the shift include

## 2025-03-26 NOTE — CARE PLAN
The patient's goals for the shift include resting well throughout the night and decreasing pain.    The clinical goals for the shift include Pt will remain safe and free from injury throughout the shift.

## 2025-03-27 ENCOUNTER — HOME HEALTH ADMISSION (OUTPATIENT)
Dept: HOME HEALTH SERVICES | Facility: HOME HEALTH | Age: 65
End: 2025-03-27
Payer: MEDICARE

## 2025-03-27 ENCOUNTER — APPOINTMENT (OUTPATIENT)
Dept: RADIOLOGY | Facility: HOSPITAL | Age: 65
End: 2025-03-27
Payer: MEDICARE

## 2025-03-27 ENCOUNTER — DOCUMENTATION (OUTPATIENT)
Dept: HOME HEALTH SERVICES | Facility: HOME HEALTH | Age: 65
End: 2025-03-27
Payer: MEDICARE

## 2025-03-27 VITALS
WEIGHT: 119.1 LBS | OXYGEN SATURATION: 92 % | RESPIRATION RATE: 18 BRPM | DIASTOLIC BLOOD PRESSURE: 83 MMHG | HEART RATE: 78 BPM | SYSTOLIC BLOOD PRESSURE: 123 MMHG | TEMPERATURE: 98.1 F | HEIGHT: 68 IN | BODY MASS INDEX: 18.05 KG/M2

## 2025-03-27 LAB
ANION GAP SERPL CALC-SCNC: 11 MMOL/L (ref 10–20)
BUN SERPL-MCNC: 10 MG/DL (ref 6–23)
CALCIUM SERPL-MCNC: 8.4 MG/DL (ref 8.6–10.6)
CHLORIDE SERPL-SCNC: 101 MMOL/L (ref 98–107)
CO2 SERPL-SCNC: 28 MMOL/L (ref 21–32)
CREAT SERPL-MCNC: 0.44 MG/DL (ref 0.5–1.05)
EGFRCR SERPLBLD CKD-EPI 2021: >90 ML/MIN/1.73M*2
ERYTHROCYTE [DISTWIDTH] IN BLOOD BY AUTOMATED COUNT: 12.6 % (ref 11.5–14.5)
GLUCOSE SERPL-MCNC: 97 MG/DL (ref 74–99)
HCT VFR BLD AUTO: 31.2 % (ref 36–46)
HGB BLD-MCNC: 9.9 G/DL (ref 12–16)
MAGNESIUM SERPL-MCNC: 2.07 MG/DL (ref 1.6–2.4)
MCH RBC QN AUTO: 32.8 PG (ref 26–34)
MCHC RBC AUTO-ENTMCNC: 31.7 G/DL (ref 32–36)
MCV RBC AUTO: 103 FL (ref 80–100)
NRBC BLD-RTO: 0 /100 WBCS (ref 0–0)
PLATELET # BLD AUTO: 284 X10*3/UL (ref 150–450)
POTASSIUM SERPL-SCNC: 3.9 MMOL/L (ref 3.5–5.3)
RBC # BLD AUTO: 3.02 X10*6/UL (ref 4–5.2)
SODIUM SERPL-SCNC: 136 MMOL/L (ref 136–145)
WBC # BLD AUTO: 7 X10*3/UL (ref 4.4–11.3)

## 2025-03-27 PROCEDURE — 2500000001 HC RX 250 WO HCPCS SELF ADMINISTERED DRUGS (ALT 637 FOR MEDICARE OP): Performed by: PHYSICIAN ASSISTANT

## 2025-03-27 PROCEDURE — 99239 HOSP IP/OBS DSCHRG MGMT >30: CPT | Performed by: NURSE PRACTITIONER

## 2025-03-27 PROCEDURE — 83735 ASSAY OF MAGNESIUM: CPT | Performed by: PHYSICIAN ASSISTANT

## 2025-03-27 PROCEDURE — 80048 BASIC METABOLIC PNL TOTAL CA: CPT | Performed by: PHYSICIAN ASSISTANT

## 2025-03-27 PROCEDURE — 71045 X-RAY EXAM CHEST 1 VIEW: CPT

## 2025-03-27 PROCEDURE — 2500000004 HC RX 250 GENERAL PHARMACY W/ HCPCS (ALT 636 FOR OP/ED): Performed by: PHYSICIAN ASSISTANT

## 2025-03-27 PROCEDURE — 71045 X-RAY EXAM CHEST 1 VIEW: CPT | Performed by: RADIOLOGY

## 2025-03-27 PROCEDURE — 85027 COMPLETE CBC AUTOMATED: CPT | Performed by: PHYSICIAN ASSISTANT

## 2025-03-27 PROCEDURE — 36415 COLL VENOUS BLD VENIPUNCTURE: CPT | Performed by: PHYSICIAN ASSISTANT

## 2025-03-27 PROCEDURE — 2500000001 HC RX 250 WO HCPCS SELF ADMINISTERED DRUGS (ALT 637 FOR MEDICARE OP): Performed by: NURSE PRACTITIONER

## 2025-03-27 PROCEDURE — 94640 AIRWAY INHALATION TREATMENT: CPT

## 2025-03-27 RX ORDER — IBUPROFEN 600 MG/1
600 TABLET ORAL EVERY 6 HOURS SCHEDULED
Qty: 20 TABLET | Refills: 0 | Status: SHIPPED | OUTPATIENT
Start: 2025-03-27

## 2025-03-27 RX ORDER — LIDOCAINE 560 MG/1
1 PATCH PERCUTANEOUS; TOPICAL; TRANSDERMAL EVERY 24 HOURS
Start: 2025-03-27

## 2025-03-27 RX ORDER — ACETAMINOPHEN 325 MG/1
650 TABLET ORAL EVERY 6 HOURS
Start: 2025-03-27

## 2025-03-27 RX ORDER — TRAMADOL HYDROCHLORIDE 50 MG/1
50 TABLET ORAL EVERY 6 HOURS PRN
Qty: 20 TABLET | Refills: 0 | Status: SHIPPED | OUTPATIENT
Start: 2025-03-27

## 2025-03-27 RX ADMIN — TIOTROPIUM BROMIDE INHALATION SPRAY 2 PUFF: 3.12 SPRAY, METERED RESPIRATORY (INHALATION) at 08:03

## 2025-03-27 RX ADMIN — AMLODIPINE BESYLATE 5 MG: 5 TABLET ORAL at 09:40

## 2025-03-27 RX ADMIN — PANTOPRAZOLE SODIUM 40 MG: 40 TABLET, DELAYED RELEASE ORAL at 09:40

## 2025-03-27 RX ADMIN — FLUTICASONE FUROATE AND VILANTEROL TRIFENATATE 1 PUFF: 100; 25 POWDER RESPIRATORY (INHALATION) at 08:03

## 2025-03-27 RX ADMIN — IBUPROFEN 600 MG: 600 TABLET, FILM COATED ORAL at 05:45

## 2025-03-27 RX ADMIN — IBUPROFEN 600 MG: 600 TABLET, FILM COATED ORAL at 00:33

## 2025-03-27 RX ADMIN — HEPARIN SODIUM 5000 UNITS: 5000 INJECTION, SOLUTION INTRAVENOUS; SUBCUTANEOUS at 00:33

## 2025-03-27 RX ADMIN — METOPROLOL TARTRATE 12.5 MG: 25 TABLET, FILM COATED ORAL at 09:37

## 2025-03-27 RX ADMIN — ACETAMINOPHEN 650 MG: 325 TABLET, FILM COATED ORAL at 09:45

## 2025-03-27 RX ADMIN — Medication 5 MG: at 00:33

## 2025-03-27 RX ADMIN — IBUPROFEN 600 MG: 600 TABLET, FILM COATED ORAL at 13:38

## 2025-03-27 RX ADMIN — HEPARIN SODIUM 5000 UNITS: 5000 INJECTION, SOLUTION INTRAVENOUS; SUBCUTANEOUS at 09:37

## 2025-03-27 RX ADMIN — ACETAMINOPHEN 650 MG: 325 TABLET, FILM COATED ORAL at 03:59

## 2025-03-27 ASSESSMENT — COGNITIVE AND FUNCTIONAL STATUS - GENERAL
MOBILITY SCORE: 24
MOBILITY SCORE: 24
DAILY ACTIVITIY SCORE: 22
DRESSING REGULAR UPPER BODY CLOTHING: A LITTLE
DAILY ACTIVITIY SCORE: 24
DRESSING REGULAR LOWER BODY CLOTHING: A LITTLE

## 2025-03-27 ASSESSMENT — PAIN SCALES - GENERAL
PAINLEVEL_OUTOF10: 4

## 2025-03-27 ASSESSMENT — PAIN - FUNCTIONAL ASSESSMENT
PAIN_FUNCTIONAL_ASSESSMENT: 0-10
PAIN_FUNCTIONAL_ASSESSMENT: 0-10

## 2025-03-27 NOTE — CARE PLAN
The patient's goals for the shift include sleeping well     The clinical goals for the shift include Pt will state a tolerable pain score and rest through the night.

## 2025-03-27 NOTE — PROGRESS NOTES
03/27/25 0947   Discharge Planning   Living Arrangements Spouse/significant other   Support Systems Spouse/significant other   Type of Residence Private residence   Expected Discharge Disposition Home H  ( HC)   Does the patient need discharge transport arranged? No     Patient s/p thoracic surgery.  Patient is medically ready for discharge.  Referral has been placed with  HC.     3/27/25@ 10:16  [facility] has confirmed the SOC for tomorrow pending insurance verification

## 2025-03-27 NOTE — CARE PLAN
The patient's goals for the shift include      The clinical goals for the shift include Pt will state a tolerable pain score and rest through the night.

## 2025-03-27 NOTE — DISCHARGE SUMMARY
Discharge Diagnosis  Malignant neoplasm of upper lobe of right lung (Multi)    Issues Requiring Follow-Up  Final pathology     Test Results Pending At Discharge  Pending Labs       Order Current Status    Surgical Pathology Exam In process          Hospital Course  Princess Nevarez is a 64 y.o. female with a past medical history   significant for HTN, GERD, generalized anxiety disorder, PONV, insomina presenting with a 1.4 cm spiculated nodule in her right upper lobe with an SUV max of 10.9.  Biopsy proven on EBUS 2/26/2025 NSCLC favoring adenocarcinoma. She underwent a right robotic assisted upper lobectomy with mediastinal lymph node dissection on 3/24/2025     3/25: frias removed MN, chest tube placed to WS   3/26: voiding. Weaned to RA.  Chest tube removed. Post pull imaging satisfactory.     At the time of discharge, her pain was controlled on an oral pain regimen, She was breathing comfortably on room air.  Shewas ambulating independently with supervision.  She was tolerating a regular diet without nausea. She was voiding regularly.      The patient was educated on post operative activity restrictions, dietary guidelines, and pain management. She will follow up with Dr. Dos Santos in the outpatient setting in two weeks with a CXR just prior to this visit. The patient has been instructed to add an OTC stool softeners or laxative while taking oral analgesia.  Deep breathing, coughing, and walking at home encouraged. All questions have been answered and concerns addressed until there were none.   She maintained an oxygen sat >91% on RA with ambulation.  Home health care and PT have been requested for this patient.     PT Plan: Ongoing PT  PT Frequency: 3 times per week  PT Discharge Recommendations: Low intensity level of continued care  Equipment Recommended upon Discharge:  (Pt has adequate DME)  PT Recommended Transfer Status: Assist x1, Assistive device  PT - OK to Discharge: Yes    Pertinent Physical Exam At  Time of Discharge  Physical Exam    Home Medications     Medication List      START taking these medications     acetaminophen 325 mg tablet; Commonly known as: Tylenol; Take 2 tablets   (650 mg) by mouth every 6 hours.   ibuprofen 600 mg tablet; Take 1 tablet (600 mg) by mouth every 6 hours.   lidocaine 4 % patch; Place 1 patch over 12 hours on the skin once every   24 hours. Remove & discard patch within 12 hours or as directed by MD.   traMADol 50 mg tablet; Commonly known as: Ultram; Take 1 tablet (50 mg)   by mouth every 6 hours if needed (pain).     CONTINUE taking these medications     albuterol 90 mcg/actuation inhaler; inhale two puffs by mouth every 4   hours if needed for wheezing   fluticasone 50 mcg/actuation nasal spray; Commonly known as: Flonase;   Administer 1 spray into each nostril once daily. Shake gently. Before   first use, prime pump. After use, clean tip and replace cap.   ipratropium-albuteroL 0.5-2.5 mg/3 mL nebulizer solution; Commonly known   as: Duo-Neb   metoprolol succinate XL 25 mg 24 hr tablet; Commonly known as:   Toprol-XL; Take 1 tablet (25 mg) by mouth once daily. Do not crush or   chew.   pantoprazole 40 mg EC tablet; Commonly known as: ProtoNix; TAKE 1 TABLET   BY MOUTH ONCE DAILY   PARoxetine 10 mg tablet; Commonly known as: Paxil; Take 1 tablet (10 mg)   by mouth once daily at bedtime.   Trelegy Ellipta 100-62.5-25 mcg blister with device; Generic drug:   fluticasone-umeclidin-vilanter; Inhale 1 puff once daily.   zolpidem 10 mg tablet; Commonly known as: Ambien; Take 1 tablet (10 mg)   by mouth as needed at bedtime for sleep.     ASK your doctor about these medications     amLODIPine 5 mg tablet; Commonly known as: Norvasc; Take 1 tablet (5 mg)   by mouth once daily.   clotrimazole-betamethasone cream; Commonly known as: Lotrisone; Apply 1   Application topically 2 times a day.       Outpatient Follow-Up  Future Appointments   Date Time Provider Department Center   5/27/2025   1:45 PM Frances Valentin MD [id number] [place]   Time spent with discharge was >30 minutes and included explanation of discharge instructions, arranging homegoing prescriptions, checking OARRS, supplies and follow-up, and creating the discharge summary of the patient's hospialization.      Lia Espinoza, APRN-CNP

## 2025-03-27 NOTE — SIGNIFICANT EVENT
Patient ambulated with front wheel walker on RA and SpO2 remained above 92% for the entirety of ambulation. VERITO Espinoza notified.

## 2025-03-27 NOTE — HH CARE COORDINATION
Home Care received a Referral for Nursing and Physical Therapy. We have processed the referral for a Start of Care on 03/28/2025.     If you have any questions or concerns, please feel free to contact us at [phone]. Follow the prompts, enter your five digit zip code, and you will be directed to your care team on [facility].

## 2025-03-27 NOTE — PROGRESS NOTES
Mrs. Burrell looks and feels well.  Her vitals are stable and her cxr is acceptable.  Plan will be for home today, on oxygen, if needed.            Luis Dos Santos MD

## 2025-03-29 ENCOUNTER — HOME CARE VISIT (OUTPATIENT)
Dept: HOME HEALTH SERVICES | Facility: HOME HEALTH | Age: 65
End: 2025-03-29
Payer: MEDICARE

## 2025-03-29 PROCEDURE — G0299 HHS/HOSPICE OF RN EA 15 MIN: HCPCS | Mod: HHH

## 2025-03-29 PROCEDURE — 169592 NO-PAY CLAIM PROCEDURE

## 2025-03-29 ASSESSMENT — ACTIVITIES OF DAILY LIVING (ADL)
AMBULATION ASSISTANCE: STAND BY ASSIST
CURRENT_FUNCTION: STAND BY ASSIST
OASIS_M1830: 03
ENTERING_EXITING_HOME: MINIMUM ASSIST

## 2025-03-29 ASSESSMENT — ENCOUNTER SYMPTOMS
PERSON REPORTING PAIN: PATIENT
CONSTIPATION: 1
PAIN LOCATION - PAIN SEVERITY: 5/10
DESCRIPTION OF MEMORY LOSS: SHORT TERM
MUSCLE WEAKNESS: 1
DYSPNEA ACTIVITY LEVEL: AT REST
LIMITED RANGE OF MOTION: 1
SHORTNESS OF BREATH: 1
CHANGE IN APPETITE: UNCHANGED
HYPERTENSION: 1
APPETITE LEVEL: FAIR
FORGETFULNESS: 1
PAIN: 1
FREQUENCY: 1
PAIN LOCATION: NECK
HEADACHES: 1
LAST BOWEL MOVEMENT: 67291

## 2025-03-29 ASSESSMENT — LIFESTYLE VARIABLES: SMOKING_STATUS: 0

## 2025-03-31 ENCOUNTER — HOME CARE VISIT (OUTPATIENT)
Dept: HOME HEALTH SERVICES | Facility: HOME HEALTH | Age: 65
End: 2025-03-31
Payer: MEDICARE

## 2025-03-31 PROCEDURE — G0151 HHCP-SERV OF PT,EA 15 MIN: HCPCS | Mod: HHH

## 2025-04-01 ENCOUNTER — HOME CARE VISIT (OUTPATIENT)
Dept: HOME HEALTH SERVICES | Facility: HOME HEALTH | Age: 65
End: 2025-04-01
Payer: MEDICARE

## 2025-04-01 VITALS
TEMPERATURE: 97.6 F | WEIGHT: 119 LBS | RESPIRATION RATE: 18 BRPM | BODY MASS INDEX: 18.09 KG/M2 | SYSTOLIC BLOOD PRESSURE: 136 MMHG | HEART RATE: 76 BPM | DIASTOLIC BLOOD PRESSURE: 84 MMHG | OXYGEN SATURATION: 97 %

## 2025-04-01 PROCEDURE — G0300 HHS/HOSPICE OF LPN EA 15 MIN: HCPCS | Mod: HHH

## 2025-04-01 ASSESSMENT — ENCOUNTER SYMPTOMS
FATIGUE: 1
PAIN LOCATION - PAIN SEVERITY: 5/10
PAIN LOCATION - PAIN FREQUENCY: FREQUENT
LOWEST PAIN SEVERITY IN PAST 24 HOURS: 0/10
LIMITED RANGE OF MOTION: 1
SHORTNESS OF BREATH: 1
PERSON REPORTING PAIN: PATIENT
DESCRIPTION OF MEMORY LOSS: SHORT TERM
DYSPNEA ON EXERTION: 1
FATIGUES EASILY: 1
DYSPNEA ACTIVITY LEVEL: AFTER AMBULATING LESS THAN 10 FT
PAIN: 1
FORGETFULNESS: 1
APPETITE LEVEL: FAIR
LAST BOWEL MOVEMENT: 67295
DRY SKIN: 1
CHANGE IN APPETITE: UNCHANGED
FREQUENCY: 1
SUBJECTIVE PAIN PROGRESSION: UNCHANGED
HIGHEST PAIN SEVERITY IN PAST 24 HOURS: 6/10
PAIN LOCATION: NECK
CONSTIPATION: 1
PAIN SEVERITY GOAL: 0/10
MUSCLE WEAKNESS: 1

## 2025-04-01 ASSESSMENT — ACTIVITIES OF DAILY LIVING (ADL): MONEY MANAGEMENT (EXPENSES/BILLS): NEEDS ASSISTANCE

## 2025-04-01 NOTE — HOME HEALTH
Patient seen today for routine SN visit and monitoring of surgical incision sites. All surgical incision sites are observed to be clean, dry, with no drainage or signs of infection to any. Patient states she did have some drainage from the site under right breast last night however, she covered with gauze as instructed and there’s been no further drainage. Patient is supplementing meals with one nutritional shake daily and drinking adequate amount of water. All VS WNL. Lung sounds CTA, POX 97% on RA at rest. No changes of medication at this time. Patient does have a follow up with surgeon on 4/9.

## 2025-04-02 SDOH — HEALTH STABILITY: PHYSICAL HEALTH: EXERCISE TYPE: INSTRUCTED PT IN SEATED, STANDING LE STRENGTHENING EX'S WITH WRITTEN HEP PROVIDED.

## 2025-04-02 ASSESSMENT — ENCOUNTER SYMPTOMS
HIGHEST PAIN SEVERITY IN PAST 24 HOURS: 3/10
LOWEST PAIN SEVERITY IN PAST 24 HOURS: 0/10
PAIN LOCATION - RELIEVING FACTORS: REST
PAIN LOCATION - PAIN DURATION: VARIABLE
PERSON REPORTING PAIN: PATIENT
SUBJECTIVE PAIN PROGRESSION: WAXING AND WANING
PAIN LOCATION - PAIN SEVERITY: 2/10
MUSCLE WEAKNESS: 1
PAIN LOCATION - PAIN FREQUENCY: INTERMITTENT
PAIN: 1
PAIN SEVERITY GOAL: 0/10
PAIN LOCATION: CHEST
PAIN LOCATION - PAIN QUALITY: DULL
PAIN LOCATION - EXACERBATING FACTORS: COUGHING

## 2025-04-02 ASSESSMENT — GAIT ASSESSMENTS
PATH: 1 - MILD/MODERATE DEVIATION OR USES WALKING AID
STEP CONTINUITY: 0 - STOPPING OR DISCONTINUITY BETWEEN STEPS
PATH SCORE: 1
BALANCE AND GAIT SCORE: 9
TRUNK SCORE: 1
STEP SYMMETRY: 0 - RIGHT AND LEFT STEP LENGTH NOT EQUAL
GAIT SCORE: 4
INITIATION OF GAIT IMMEDIATELY AFTER GO: 0 - ANY HESITANCY OR MULTIPLE ATTEMPTS TO START
WALKING STANCE: 0 - HEELS APART
TRUNK: 1 - NO SWAY BUT FLEXION OF KNEES OR BACK OR SPREADS ARMS WHILE WALKING

## 2025-04-02 ASSESSMENT — BALANCE ASSESSMENTS
NUDGED SCORE: 0
TURNING 360 DEGREES STEPS: 0 - DISCONTINUOUS STEPS
EYES CLOSED AT MAXIMUM POSITION NUDGED: 0 - UNSTEADY
IMMEDIATE STANDING BALANCE FIRST 5 SECONDS: 1 - STEADY BUT USES WALKER OR OTHER SUPPORT
SITTING DOWN: 1 - USES ARMS OR NOT SMOOTH MOTION
BALANCE SCORE: 5
ARISING SCORE: 0
STANDING BALANCE: 1 - STEADY BUT WIDE STANCE AND USES CANE OR OTHER SUPPORT
SITTING BALANCE: 1 - STEADY, SAFE
NUDGED: 0 - BEGINS TO FALL
ATTEMPTS TO ARISE: 0 - UNABLE WITHOUT HELP
ARISES: 0 - UNABLE WITHOUT HELP

## 2025-04-02 ASSESSMENT — ACTIVITIES OF DAILY LIVING (ADL)
AMBULATION ASSISTANCE: STAND BY ASSIST
CURRENT_FUNCTION: STAND BY ASSIST
AMBULATION_DISTANCE/DURATION_TOLERATED: 30'

## 2025-04-02 NOTE — SIGNIFICANT EVENT
"Thoracic Surgery NP    Ms. Edna Nevarez is calling today to report \"new\" clear drainage from her previous chest tube site.  Offered her same day evaluation with KOLE in Dr. Dos Santos's clinic, which she declined due to \"weather and no ride\"  I reassured her that clear fluid from the previous chest tube site can be normal/expected and to keep the site clean and dry. Denies fevers, SoB or any other new symptoms.   She has been advised that if the drainage is bright red blood or purulent, report to ED and notify our office right away.  She agrees to this plan.   "

## 2025-04-03 ENCOUNTER — HOME CARE VISIT (OUTPATIENT)
Dept: HOME HEALTH SERVICES | Facility: HOME HEALTH | Age: 65
End: 2025-04-03
Payer: MEDICARE

## 2025-04-03 PROCEDURE — G0157 HHC PT ASSISTANT EA 15: HCPCS | Mod: CQ,HHH

## 2025-04-03 ASSESSMENT — ENCOUNTER SYMPTOMS
PAIN SEVERITY GOAL: 0/10
HIGHEST PAIN SEVERITY IN PAST 24 HOURS: 6/10
LOWEST PAIN SEVERITY IN PAST 24 HOURS: 5/10
PAIN LOCATION: BACK
PERSON REPORTING PAIN: PATIENT
PAIN: 1

## 2025-04-04 ENCOUNTER — HOME CARE VISIT (OUTPATIENT)
Dept: HOME HEALTH SERVICES | Facility: HOME HEALTH | Age: 65
End: 2025-04-04
Payer: MEDICARE

## 2025-04-04 DIAGNOSIS — J44.1 COPD EXACERBATION (MULTI): Primary | ICD-10-CM

## 2025-04-07 ENCOUNTER — HOME CARE VISIT (OUTPATIENT)
Dept: HOME HEALTH SERVICES | Facility: HOME HEALTH | Age: 65
End: 2025-04-07
Payer: MEDICARE

## 2025-04-07 VITALS
DIASTOLIC BLOOD PRESSURE: 86 MMHG | OXYGEN SATURATION: 97 % | TEMPERATURE: 97.9 F | SYSTOLIC BLOOD PRESSURE: 138 MMHG | HEART RATE: 68 BPM

## 2025-04-07 PROCEDURE — G0299 HHS/HOSPICE OF RN EA 15 MIN: HCPCS | Mod: HHH

## 2025-04-07 ASSESSMENT — ENCOUNTER SYMPTOMS
SUBJECTIVE PAIN PROGRESSION: WAXING AND WANING
APPETITE LEVEL: FAIR
PAIN LOCATION - PAIN SEVERITY: 3/10
CHANGE IN APPETITE: DECREASED
MUSCLE WEAKNESS: 1
PERSON REPORTING PAIN: PATIENT
LAST BOWEL MOVEMENT: 67301
HYPERTENSION: 1
FATIGUES EASILY: 1
PAIN LOCATION: INCISION
CONSTIPATION: 1
PAIN: 1
HEADACHES: 1

## 2025-04-07 ASSESSMENT — ACTIVITIES OF DAILY LIVING (ADL)
AMBULATION ASSISTANCE: STAND BY ASSIST
CURRENT_FUNCTION: STAND BY ASSIST

## 2025-04-09 ENCOUNTER — OFFICE VISIT (OUTPATIENT)
Dept: SURGERY | Facility: HOSPITAL | Age: 65
End: 2025-04-09
Payer: MEDICARE

## 2025-04-09 ENCOUNTER — HOSPITAL ENCOUNTER (OUTPATIENT)
Dept: RADIOLOGY | Facility: HOSPITAL | Age: 65
Discharge: HOME | End: 2025-04-09
Payer: MEDICARE

## 2025-04-09 VITALS
OXYGEN SATURATION: 100 % | WEIGHT: 115.2 LBS | HEART RATE: 63 BPM | SYSTOLIC BLOOD PRESSURE: 122 MMHG | DIASTOLIC BLOOD PRESSURE: 61 MMHG | RESPIRATION RATE: 16 BRPM | BODY MASS INDEX: 17.52 KG/M2 | TEMPERATURE: 97.5 F

## 2025-04-09 DIAGNOSIS — C34.11 MALIGNANT NEOPLASM OF UPPER LOBE OF RIGHT LUNG (MULTI): ICD-10-CM

## 2025-04-09 LAB
LAB AP ASR DISCLAIMER: NORMAL
LAB AP BLOCK FOR ADDITIONAL STUDIES: NORMAL
LABORATORY COMMENT REPORT: NORMAL
PATH REPORT.COMMENTS IMP SPEC: NORMAL
PATH REPORT.FINAL DX SPEC: NORMAL
PATH REPORT.GROSS SPEC: NORMAL
PATH REPORT.RELEVANT HX SPEC: NORMAL
PATH REPORT.TOTAL CANCER: NORMAL
PATHOLOGY SYNOPTIC REPORT: NORMAL

## 2025-04-09 PROCEDURE — 71046 X-RAY EXAM CHEST 2 VIEWS: CPT

## 2025-04-09 PROCEDURE — 3078F DIAST BP <80 MM HG: CPT | Performed by: THORACIC SURGERY (CARDIOTHORACIC VASCULAR SURGERY)

## 2025-04-09 PROCEDURE — 1159F MED LIST DOCD IN RCRD: CPT | Performed by: THORACIC SURGERY (CARDIOTHORACIC VASCULAR SURGERY)

## 2025-04-09 PROCEDURE — 3074F SYST BP LT 130 MM HG: CPT | Performed by: THORACIC SURGERY (CARDIOTHORACIC VASCULAR SURGERY)

## 2025-04-09 PROCEDURE — 1111F DSCHRG MED/CURRENT MED MERGE: CPT | Performed by: THORACIC SURGERY (CARDIOTHORACIC VASCULAR SURGERY)

## 2025-04-09 PROCEDURE — 1125F AMNT PAIN NOTED PAIN PRSNT: CPT | Performed by: THORACIC SURGERY (CARDIOTHORACIC VASCULAR SURGERY)

## 2025-04-09 PROCEDURE — 1123F ACP DISCUSS/DSCN MKR DOCD: CPT | Performed by: THORACIC SURGERY (CARDIOTHORACIC VASCULAR SURGERY)

## 2025-04-09 PROCEDURE — 99211 OFF/OP EST MAY X REQ PHY/QHP: CPT | Mod: 25 | Performed by: THORACIC SURGERY (CARDIOTHORACIC VASCULAR SURGERY)

## 2025-04-09 RX ORDER — IBUPROFEN 600 MG/1
600 TABLET ORAL EVERY 6 HOURS PRN
Qty: 120 TABLET | Refills: 0 | Status: SHIPPED | OUTPATIENT
Start: 2025-04-09 | End: 2025-05-09

## 2025-04-09 ASSESSMENT — PAIN SCALES - GENERAL: PAINLEVEL_OUTOF10: 7

## 2025-04-09 NOTE — PROGRESS NOTES
Mrs. Burrell underwent Robotic right upper lobectomy on 3/24/25 and was discharged without complication.  She does complain of what sounds like some neuropathic pain in the lower anterior rib section.  Her breathing is comfortable    On exam her incisions are healing well.  Her room air saturations are 100%    Her chest x-ray is clear    Surgical Pathology Exam: [id number]  Order: [id number]   Collected 3/24/2025 15:56       Status: Final result       Visible to patient: Yes (not seen)       Dx: Malignant neoplasm of upper lobe of r...    0 Result Notes      Component    FINAL DIAGNOSIS   A. LYMPH NODE, LEVEL 9; BIOPSY:   -- One lymph node, negative for metastasis (0/1)     B. LYMPH NODE, LEVEL 8; BIOPSY:   -- One lymph node, negative for metastasis (0/1)     C. LYMPH NODE, LEVEL 7; BIOPSY:   -- One lymph node, negative for metastasis (0/1)     D. LYMPH NODE, LEVEL 11; BIOPSY:   -- One lymph node, negative for metastasis (0/1)     E. LYMPH NODE, PARATRACHEAL; BIOPSY:   -- One lymph node, negative for metastasis (0/1)     F. LYMPH NODE, LEVEL 12; BIOPSY:   -- One lymph node, negative for metastasis (0/1)     G. LUNG, RIGHT UPPER LOBE; LOBECTOMY:  -- Adenocarcinoma, solid and complex acinar patterns, invading into the hilar fat. See comment  -- Tumor size: 3.1 cm in greatest dimension  -- One lymph node, negative for metastasis (0/1)  -- Resection margins are free of tumor         Electronically signed by Moe Spear MD on 4/9/2025 at 1104        By the signature on this report, the individual or group listed as making the Final Interpretation/Diagnosis certifies that they have reviewed this case.    Comment    The tumor cells are positive for CK7 and TTF1 (focal and weak) immunostain, while they are negative for p40. VVG elastic stain is positive for visceral pleural invasion.   Case Summary Report   LUNG   8th Edition - Protocol posted: 9/21/2022LUNG - All Specimens  SPECIMEN   Procedure  Lobectomy   Specimen  Laterality  Right   TUMOR   Tumor Focality  Single focus   Tumor Site  Upper lobe of lung   Tumor Size     Total Tumor Size (size of entire tumor)  Greatest Dimension (Centimeters): 3.1 cm   Size of Invasive Component  Greatest Dimension (Centimeters): 3.1 cm   Histologic Type  Invasive solid adenocarcinoma   Histologic Patterns Present  Solid: 60     Complex glands (cribriform and fused glands): 40   Histologic Grade  G3, poorly differentiated   Visceral Pleura Invasion  Present   Direct Invasion of Adjacent Structures  Present   Involved Adjacent Structures  Hilar soft tissues   Treatment Effect  No known presurgical therapy   Lymphovascular Invasion  Not identified   MARGINS   Margin Status for Invasive Carcinoma  All margins negative for invasive carcinoma   Closest Margin(s) to Invasive Carcinoma  Parenchymal   Distance from Invasive Carcinoma to Closest Margin  0.5 cm   Margin Status for Non-Invasive Tumor  All margins negative for non-invasive tumor   REGIONAL LYMPH NODES   Lymph Node(s) from Prior Procedures  No known prior lymph node sampling performed   Regional Lymph Node Status  All regional lymph nodes negative for tumor   Number of Lymph Nodes Examined  7   Lizeth Site(s) Examined  4R: Lower paratracheal     8R: Para-esophageal (below timur)     9R: Pulmonary ligament     11R: Interlobar     12R: Lobar     13R: Segmental     7: Subcarinal   PATHOLOGIC STAGE CLASSIFICATION (pTNM, AJCC 8th Edition)   Reporting of pT, pN, and (when applicable) pM categories is based on information available to the pathologist at the time the report is issued. As per the AJCC (Chapter 1, 8th Ed.) it is the managing physician’s responsibility to establish the final pathologic stage based upon all pertinent information, including but potentially not limited to this pathology report.   pT Category  pT2a   pN Category  pN0           Princess has had complete resection of a stage Ib lung cancer.  No adjuvant therapy is needed.   We will see her again in 6 months time with a chest CT.  In the interim I have advised Motrin and omeprazole for any chest discomfort.

## 2025-04-10 ENCOUNTER — HOME CARE VISIT (OUTPATIENT)
Dept: HOME HEALTH SERVICES | Facility: HOME HEALTH | Age: 65
End: 2025-04-10
Payer: MEDICARE

## 2025-04-10 PROCEDURE — G0157 HHC PT ASSISTANT EA 15: HCPCS | Mod: CQ,HHH

## 2025-04-10 ASSESSMENT — ENCOUNTER SYMPTOMS
PAIN SEVERITY GOAL: 0/10
PAIN LOCATION: ABDOMEN
PAIN: 1
PERSON REPORTING PAIN: PATIENT
HIGHEST PAIN SEVERITY IN PAST 24 HOURS: 4/10
LOWEST PAIN SEVERITY IN PAST 24 HOURS: 4/10

## 2025-04-11 ENCOUNTER — HOME CARE VISIT (OUTPATIENT)
Dept: HOME HEALTH SERVICES | Facility: HOME HEALTH | Age: 65
End: 2025-04-11
Payer: MEDICARE

## 2025-04-14 ENCOUNTER — HOME CARE VISIT (OUTPATIENT)
Dept: HOME HEALTH SERVICES | Facility: HOME HEALTH | Age: 65
End: 2025-04-14
Payer: MEDICARE

## 2025-04-14 PROCEDURE — G0157 HHC PT ASSISTANT EA 15: HCPCS | Mod: CQ,HHH

## 2025-04-14 ASSESSMENT — ENCOUNTER SYMPTOMS
PERSON REPORTING PAIN: PATIENT
DENIES PAIN: 1

## 2025-04-15 ENCOUNTER — HOME CARE VISIT (OUTPATIENT)
Dept: HOME HEALTH SERVICES | Facility: HOME HEALTH | Age: 65
End: 2025-04-15
Payer: MEDICARE

## 2025-04-15 VITALS
OXYGEN SATURATION: 98 % | DIASTOLIC BLOOD PRESSURE: 80 MMHG | TEMPERATURE: 97.7 F | SYSTOLIC BLOOD PRESSURE: 132 MMHG | HEART RATE: 68 BPM | RESPIRATION RATE: 18 BRPM

## 2025-04-15 PROCEDURE — G0300 HHS/HOSPICE OF LPN EA 15 MIN: HCPCS | Mod: HHH

## 2025-04-15 ASSESSMENT — ENCOUNTER SYMPTOMS
PAIN LOCATION - PAIN FREQUENCY: INFREQUENT
PAIN LOCATION: INCISION
CHANGE IN APPETITE: UNCHANGED
APPETITE LEVEL: FAIR
FATIGUE: 1
HIGHEST PAIN SEVERITY IN PAST 24 HOURS: 3/10
PAIN LOCATION - PAIN QUALITY: SORE
PAIN LOCATION - PAIN SEVERITY: 2/10
SUBJECTIVE PAIN PROGRESSION: GRADUALLY IMPROVING
FATIGUES EASILY: 1
BOWEL PATTERN NORMAL: 1
LAST BOWEL MOVEMENT: 67310
PAIN: 1
PERSON REPORTING PAIN: PATIENT
PAIN SEVERITY GOAL: 0/10
HEADACHES: 1
LOWEST PAIN SEVERITY IN PAST 24 HOURS: 0/10
MUSCLE WEAKNESS: 1

## 2025-04-15 ASSESSMENT — ACTIVITIES OF DAILY LIVING (ADL)
AMBULATION ASSISTANCE: STAND BY ASSIST
MONEY MANAGEMENT (EXPENSES/BILLS): NEEDS ASSISTANCE

## 2025-04-15 NOTE — HOME HEALTH
Patient seen today for routine SN visit, sister from out of state in town and present for visit. Patient states pain to write side of chest incision site is decreasing, she is experiencing discomfort when reaching or reaching down to  something off the floor. Incision sites are observed to be 100% epithelialized, clean, dry and intact. Patient has been walking for longer distances daily and states her episodes of SOB have decreased. She continues to use incentive spirometer. All VS WNL. Lung sounds CTA. Patient states appetite has increased and she denies any issues moving bowels, or voiding. No changes in medications at this time.

## 2025-04-17 ENCOUNTER — HOME CARE VISIT (OUTPATIENT)
Dept: HOME HEALTH SERVICES | Facility: HOME HEALTH | Age: 65
End: 2025-04-17
Payer: MEDICARE

## 2025-04-17 VITALS
SYSTOLIC BLOOD PRESSURE: 123 MMHG | OXYGEN SATURATION: 99 % | DIASTOLIC BLOOD PRESSURE: 66 MMHG | HEART RATE: 67 BPM | RESPIRATION RATE: 16 BRPM

## 2025-04-17 PROCEDURE — G0151 HHCP-SERV OF PT,EA 15 MIN: HCPCS | Mod: HHH

## 2025-04-17 SDOH — HEALTH STABILITY: PHYSICAL HEALTH: EXERCISE TYPE: STANDING LE STRENGTHENING EXERCISES

## 2025-04-17 ASSESSMENT — GAIT ASSESSMENTS
TRUNK SCORE: 2
GAIT SCORE: 12
WALKING STANCE: 1 - HEELS ALMOST TOUCHING WHILE WALKING
STEP CONTINUITY: 1 - STEPS APPEAR CONTINUOUS
BALANCE AND GAIT SCORE: 25
STEP SYMMETRY: 1 - RIGHT AND LEFT STEP LENGTH APPEAR EQUAL
INITIATION OF GAIT IMMEDIATELY AFTER GO: 1 - NO HESITANCY
PATH SCORE: 2
TRUNK: 2 - NO SWAY, NO FLEXION, NO USE OF ARMS, NO WALKING AID
PATH: 2 - STRAIGHT WITHOUT WALKING AID

## 2025-04-17 ASSESSMENT — ENCOUNTER SYMPTOMS
HIGHEST PAIN SEVERITY IN PAST 24 HOURS: 4/10
PAIN LOCATION: CHEST
PAIN LOCATION - EXACERBATING FACTORS: PROLONGED ACTIVITY
PAIN LOCATION - PAIN QUALITY: ACHING
PAIN LOCATION - PAIN DURATION: VARIABLE
PAIN SEVERITY GOAL: 0/10
PAIN: 1
LOWEST PAIN SEVERITY IN PAST 24 HOURS: 0/10
SUBJECTIVE PAIN PROGRESSION: GRADUALLY IMPROVING
PERSON REPORTING PAIN: PATIENT
PAIN LOCATION - PAIN SEVERITY: 4/10
PAIN LOCATION - PAIN FREQUENCY: INTERMITTENT

## 2025-04-17 ASSESSMENT — BALANCE ASSESSMENTS
EYES CLOSED AT MAXIMUM POSITION NUDGED: 0 - UNSTEADY
SITTING DOWN: 1 - USES ARMS OR NOT SMOOTH MOTION
ARISES: 1 - ABLE, USES ARMS TO HELP
NUDGED: 2 - STEADY
BALANCE SCORE: 13
STANDING BALANCE: 2 - NARROW STANCE WITHOUT SUPPORT
ATTEMPTS TO ARISE: 2 - ABLE TO RISE, ONE ATTEMPT
SITTING BALANCE: 1 - STEADY, SAFE
TURNING 360 DEGREES STEPS: 1 - CONTINUOUS STEPS
NUDGED SCORE: 2
ARISING SCORE: 1
IMMEDIATE STANDING BALANCE FIRST 5 SECONDS: 2 - STEADY WITHOUT WALKER OR OTHER SUPPORT

## 2025-04-22 ENCOUNTER — HOME CARE VISIT (OUTPATIENT)
Dept: HOME HEALTH SERVICES | Facility: HOME HEALTH | Age: 65
End: 2025-04-22
Payer: MEDICARE

## 2025-04-22 VITALS
TEMPERATURE: 98.1 F | HEART RATE: 61 BPM | OXYGEN SATURATION: 97 % | SYSTOLIC BLOOD PRESSURE: 114 MMHG | DIASTOLIC BLOOD PRESSURE: 84 MMHG

## 2025-04-22 PROCEDURE — G0299 HHS/HOSPICE OF RN EA 15 MIN: HCPCS | Mod: HHH

## 2025-04-22 ASSESSMENT — ENCOUNTER SYMPTOMS
PERSON REPORTING PAIN: PATIENT
PAIN LOCATION - PAIN SEVERITY: 4/10
PAIN LOCATION: ABDOMEN
PAIN: 1

## 2025-04-22 ASSESSMENT — ACTIVITIES OF DAILY LIVING (ADL)
HOME_HEALTH_OASIS: 00
OASIS_M1830: 00

## 2025-04-28 DIAGNOSIS — C34.11 MALIGNANT NEOPLASM OF UPPER LOBE OF RIGHT LUNG (MULTI): Primary | ICD-10-CM

## 2025-04-30 ENCOUNTER — OFFICE VISIT (OUTPATIENT)
Dept: SURGERY | Facility: HOSPITAL | Age: 65
End: 2025-04-30
Payer: MEDICARE

## 2025-04-30 ENCOUNTER — HOSPITAL ENCOUNTER (OUTPATIENT)
Dept: RADIOLOGY | Facility: HOSPITAL | Age: 65
Discharge: HOME | End: 2025-04-30
Payer: MEDICARE

## 2025-04-30 VITALS
BODY MASS INDEX: 17.73 KG/M2 | TEMPERATURE: 97.2 F | OXYGEN SATURATION: 99 % | RESPIRATION RATE: 14 BRPM | DIASTOLIC BLOOD PRESSURE: 74 MMHG | SYSTOLIC BLOOD PRESSURE: 122 MMHG | HEART RATE: 84 BPM | WEIGHT: 116.6 LBS

## 2025-04-30 DIAGNOSIS — G89.18 POST-OP PAIN: Primary | ICD-10-CM

## 2025-04-30 DIAGNOSIS — C34.11 MALIGNANT NEOPLASM OF UPPER LOBE OF RIGHT LUNG (MULTI): ICD-10-CM

## 2025-04-30 PROCEDURE — 71046 X-RAY EXAM CHEST 2 VIEWS: CPT | Performed by: RADIOLOGY

## 2025-04-30 PROCEDURE — 99211 OFF/OP EST MAY X REQ PHY/QHP: CPT | Mod: 25 | Performed by: PHYSICIAN ASSISTANT

## 2025-04-30 PROCEDURE — 71046 X-RAY EXAM CHEST 2 VIEWS: CPT

## 2025-04-30 RX ORDER — METHOCARBAMOL 750 MG/1
750 TABLET, FILM COATED ORAL 3 TIMES DAILY PRN
Qty: 30 TABLET | Refills: 0 | Status: SHIPPED | OUTPATIENT
Start: 2025-04-30 | End: 2025-05-10

## 2025-04-30 RX ORDER — GABAPENTIN 100 MG/1
100 CAPSULE ORAL 3 TIMES DAILY
Qty: 90 CAPSULE | Refills: 0 | Status: SHIPPED | OUTPATIENT
Start: 2025-04-30 | End: 2025-05-30

## 2025-04-30 ASSESSMENT — PAIN SCALES - GENERAL: PAINLEVEL_OUTOF10: 7

## 2025-04-30 NOTE — PROGRESS NOTES
Subjective   Edna Nevarez  is a 64 y.o. female who presents for post operative pain    She underwent a robotic right upper lobectomy on 3/24/25. Path shows adenocarcinoma 3.1cm [id number]    Current Medications[1]     Currently the patient is presenting for post-operative evaluation. She reports the following symptoms: chest pain and pain which is numb and achey overlying anterior incision. Chronic throughout the day relieved by laying flat. Worse with lifting and denies the following symptoms: shortness of breath at rest, shortness of breath with activity, cough, hemoptysis, fevers, chills, and weight loss.      There have been no significant changes to their documented medical, surgical and family history.     No admissions to the hospital, trips to the ER or significant medial events since our last visit    She  reports that she quit smoking about 5 months ago. Her smoking use included cigarettes. She started smoking about 51 years ago. She has a 76.3 pack-year smoking history. She has never used smokeless tobacco. She reports current alcohol use. She reports that she does not use drugs.    Objective   Physical Exam  The patient is well-appearing and in no acute distress. The trachea is midline and there is no crepitus. The lungs were clear to auscultation, there was no dullness to percussion, no fremitus or egophony. There was good effort and excursion. The heart had a regular rate and rhythm. The abdomen was soft, nontender and nondistended. The extremities had no edema. Surgical incisions are healing well.  Diagnostic Studies  I reviewed a post-operative CXR which shows no significant pleural effusion or pneumothorax    Assessment/Plan   I believe that the patient is recovering appropriately from their recent surgery. 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  - Rx sent for gabapentin and robaxin -30 days    The patient is recently status post surgical intervention.  I believe they are recovering  appropriately.  Their clinical and/or radiographic presentation suggest no evidence of postoperative complications.    I recommend CT chest in  6 months. This has been scheduled     I discussed this in detail with the patient, including a discussion of alternatives. They were comfortable with this approach.       Shira Navas PA-C  Department of Thoracic and Esophageal Surgery  [facility]  [phone]          [1]   Current Outpatient Medications:     acetaminophen (Tylenol) 500 mg tablet, Take 2 tablets (1,000 mg) by mouth 3 times a day., Disp: , Rfl:     albuterol 90 mcg/actuation inhaler, inhale two puffs by mouth every 4 hours if needed for wheezing, Disp: 8.5 g, Rfl: 3    amLODIPine (Norvasc) 5 mg tablet, Take 1 tablet (5 mg) by mouth once daily., Disp: 90 tablet, Rfl: 3    clotrimazole-betamethasone (Lotrisone) cream, Apply 1 Application topically 2 times a day., Disp: 45 g, Rfl: 1    fluticasone (Flonase) 50 mcg/actuation nasal spray, Administer 1 spray into each nostril once daily. Shake gently. Before first use, prime pump. After use, clean tip and replace cap., Disp: 16 g, Rfl: 11    FLUTICASONE PROPIONATE NASL, Administer 2 sprays into affected nostril(s) once daily as needed (nasal congestion). Indications: nasal congestion, Disp: , Rfl:     fluticasone-umeclidin-vilanter (Trelegy Ellipta) 100-62.5-25 mcg blister with device, Inhale 1 puff once daily., Disp: 60 each, Rfl: 6    guaiFENesin (Mucinex) 600 mg 12 hr tablet, Take 1 tablet (600 mg) by mouth once daily as needed for cough., Disp: , Rfl:     ibuprofen 600 mg tablet, Take 1 tablet (600 mg) by mouth every 6 hours., Disp: 20 tablet, Rfl: 0    ibuprofen 600 mg tablet, Take 1 tablet (600 mg) by mouth every 6 hours if needed for moderate pain (4 - 6)., Disp: 120 tablet, Rfl: 0    ipratropium-albuteroL (Duo-Neb) 0.5-2.5 mg/3 mL nebulizer solution, INHALE THE CONTENTS OF 1 VIAL (3 ML) VIA NEBULIZER EVERY 12 HOURS AS  NEEDED, Disp: , Rfl:     metoprolol succinate XL (Toprol-XL) 25 mg 24 hr tablet, Take 1 tablet (25 mg) by mouth once daily. Do not crush or chew., Disp: 90 tablet, Rfl: 3    pantoprazole (ProtoNix) 40 mg EC tablet, TAKE 1 TABLET BY MOUTH ONCE DAILY, Disp: 90 tablet, Rfl: 1    PARoxetine (Paxil) 10 mg tablet, Take 1 tablet (10 mg) by mouth once daily at bedtime., Disp: 90 tablet, Rfl: 3    traMADol (Ultram) 50 mg tablet, Take 1 tablet (50 mg) by mouth every 6 hours if needed (pain)., Disp: 20 tablet, Rfl: 0    zolpidem (Ambien) 10 mg tablet, Take 1 tablet (10 mg) by mouth as needed at bedtime for sleep., Disp: 30 tablet, Rfl: 2

## 2025-05-12 DIAGNOSIS — C34.11 MALIGNANT NEOPLASM OF UPPER LOBE OF RIGHT LUNG (MULTI): Primary | ICD-10-CM

## 2025-05-12 RX ORDER — CYCLOBENZAPRINE HCL 5 MG
5 TABLET ORAL NIGHTLY PRN
Qty: 30 TABLET | Refills: 0 | Status: SHIPPED | OUTPATIENT
Start: 2025-05-12 | End: 2025-06-11

## 2025-05-20 DIAGNOSIS — G89.18 POST-OP PAIN: ICD-10-CM

## 2025-05-20 RX ORDER — GABAPENTIN 100 MG/1
100 CAPSULE ORAL 3 TIMES DAILY
Qty: 90 CAPSULE | Refills: 1 | Status: SHIPPED | OUTPATIENT
Start: 2025-05-20 | End: 2025-07-19

## 2025-05-20 RX ORDER — GABAPENTIN 100 MG/1
100 CAPSULE ORAL EVERY 6 HOURS
Qty: 120 CAPSULE | Refills: 0 | Status: SHIPPED | OUTPATIENT
Start: 2025-05-20 | End: 2025-05-20

## 2025-05-27 ENCOUNTER — APPOINTMENT (OUTPATIENT)
Dept: PRIMARY CARE | Facility: CLINIC | Age: 65
End: 2025-05-27
Payer: MEDICARE

## 2025-05-27 VITALS
WEIGHT: 129 LBS | HEIGHT: 68 IN | HEART RATE: 69 BPM | OXYGEN SATURATION: 96 % | TEMPERATURE: 97 F | BODY MASS INDEX: 19.55 KG/M2 | SYSTOLIC BLOOD PRESSURE: 121 MMHG | DIASTOLIC BLOOD PRESSURE: 74 MMHG

## 2025-05-27 DIAGNOSIS — G47.00 INSOMNIA, UNSPECIFIED TYPE: ICD-10-CM

## 2025-05-27 DIAGNOSIS — I10 BENIGN ESSENTIAL HYPERTENSION: ICD-10-CM

## 2025-05-27 DIAGNOSIS — Z12.31 SCREENING MAMMOGRAM FOR BREAST CANCER: ICD-10-CM

## 2025-05-27 DIAGNOSIS — E03.9 ACQUIRED HYPOTHYROIDISM: ICD-10-CM

## 2025-05-27 DIAGNOSIS — Z12.31 ENCOUNTER FOR SCREENING MAMMOGRAM FOR MALIGNANT NEOPLASM OF BREAST: Primary | ICD-10-CM

## 2025-05-27 DIAGNOSIS — Z13.220 NEED FOR LIPID SCREENING: ICD-10-CM

## 2025-05-27 DIAGNOSIS — F41.1 GAD (GENERALIZED ANXIETY DISORDER): ICD-10-CM

## 2025-05-27 DIAGNOSIS — Z09 HOSPITAL DISCHARGE FOLLOW-UP: ICD-10-CM

## 2025-05-27 DIAGNOSIS — Z79.899 MEDICATION MANAGEMENT: ICD-10-CM

## 2025-05-27 DIAGNOSIS — R73.9 HYPERGLYCEMIA: ICD-10-CM

## 2025-05-27 DIAGNOSIS — Z11.59 NEED FOR HEPATITIS C SCREENING TEST: ICD-10-CM

## 2025-05-27 DIAGNOSIS — J01.90 ACUTE SINUSITIS, RECURRENCE NOT SPECIFIED, UNSPECIFIED LOCATION: ICD-10-CM

## 2025-05-27 DIAGNOSIS — K21.9 GASTROESOPHAGEAL REFLUX DISEASE WITHOUT ESOPHAGITIS: ICD-10-CM

## 2025-05-27 PROBLEM — Z98.890 PONV (POSTOPERATIVE NAUSEA AND VOMITING): Status: RESOLVED | Noted: 2025-02-26 | Resolved: 2025-05-27

## 2025-05-27 PROBLEM — R11.2 PONV (POSTOPERATIVE NAUSEA AND VOMITING): Status: RESOLVED | Noted: 2025-02-26 | Resolved: 2025-05-27

## 2025-05-27 PROCEDURE — G2211 COMPLEX E/M VISIT ADD ON: HCPCS | Performed by: INTERNAL MEDICINE

## 2025-05-27 PROCEDURE — 1036F TOBACCO NON-USER: CPT | Performed by: INTERNAL MEDICINE

## 2025-05-27 PROCEDURE — 3074F SYST BP LT 130 MM HG: CPT | Performed by: INTERNAL MEDICINE

## 2025-05-27 PROCEDURE — 1159F MED LIST DOCD IN RCRD: CPT | Performed by: INTERNAL MEDICINE

## 2025-05-27 PROCEDURE — 3008F BODY MASS INDEX DOCD: CPT | Performed by: INTERNAL MEDICINE

## 2025-05-27 PROCEDURE — 3078F DIAST BP <80 MM HG: CPT | Performed by: INTERNAL MEDICINE

## 2025-05-27 PROCEDURE — 99215 OFFICE O/P EST HI 40 MIN: CPT | Performed by: INTERNAL MEDICINE

## 2025-05-27 PROCEDURE — 1160F RVW MEDS BY RX/DR IN RCRD: CPT | Performed by: INTERNAL MEDICINE

## 2025-05-27 RX ORDER — ZOLPIDEM TARTRATE 10 MG/1
10 TABLET ORAL NIGHTLY PRN
Qty: 30 TABLET | Refills: 2 | Status: SHIPPED | OUTPATIENT
Start: 2025-05-27

## 2025-05-27 RX ORDER — FLUTICASONE PROPIONATE 50 MCG
1 SPRAY, SUSPENSION (ML) NASAL DAILY
Qty: 16 G | Refills: 11 | Status: SHIPPED | OUTPATIENT
Start: 2025-05-27 | End: 2026-05-27

## 2025-05-27 RX ORDER — PANTOPRAZOLE SODIUM 40 MG/1
40 TABLET, DELAYED RELEASE ORAL DAILY
Qty: 90 TABLET | Refills: 3 | Status: SHIPPED | OUTPATIENT
Start: 2025-05-27

## 2025-05-27 NOTE — ASSESSMENT & PLAN NOTE
Colon polyps with gastritis advised continue Protonix 40 mg a day GI evaluation once a year for EGD surveillance for Whitmore's esophagitis

## 2025-05-27 NOTE — PROGRESS NOTES
"Subjective   Patient ID: Princess Nevarez \"Collin" is a 64 y.o. female who presents for Follow-up (3 month ).    Mrs. Burrell underwent Robotic right upper lobectomy on 3/24/25  - Adenocarcinoma, solid and complex acinar patterns, invading into the hilar fat. See comment  -- Tumor size: 3.1 cm in greatest dimension  -- One lymph node, negative for metastasis (0/1)  -- Resection margins are free of tumor     Assessment/Plan     Problem List Items Addressed This Visit       Benign essential hypertension    Hypertension continue aspirin 81 mg amlodipine 5 mg Toprol 25 mg BMP twice a year         Relevant Orders    Hemoglobin A1c    Albumin-Creatinine Ratio, Urine Random    GERD (gastroesophageal reflux disease)    Colon polyps with gastritis advised continue Protonix 40 mg a day GI evaluation once a year for EGD surveillance for Whitmore's esophagitis         Relevant Medications    pantoprazole (ProtoNix) 40 mg EC tablet    Insomnia    Relevant Medications    zolpidem (Ambien) 10 mg tablet    Other Relevant Orders    Drug Screen, Urine With Reflex to Confirmation    CON (generalized anxiety disorder)    I personally reviewed the OAR[place] report for this patient. This report is scanned into the electronic medical record. I have considered  the risks of abuse, dependence, addiction and diversion. After discussion, patient does have a good understanding of the safety and  risks of this medication. I believe that it is clinically appropriate for this patient to be prescribed this medication.  See patient back in 6 weeks.         Hospital discharge follow-up    Face to face visit with patient discuss discharge medication and outpatient medication ceconciliations and answers all questions to patient's and caregiver review hospital record pending diagnostic test and treatment orders to improved coordinate care across the medical community and  referal with provider/service to support patient's health and health related " problems to increase patient's satisfaction by reducing risk of readmission I improving And meeting patient's needs advise bring all prescription and nonprescription medication with you.            Other Visit Diagnoses         Encounter for screening mammogram for malignant neoplasm of breast    -  Primary      Acute sinusitis, recurrence not specified, unspecified location        Relevant Medications    fluticasone (Flonase) 50 mcg/actuation nasal spray      Need for lipid screening        Relevant Orders    Lipid panel      Need for hepatitis C screening test        Relevant Orders    Hepatitis C antibody      Acquired hypothyroidism        Relevant Orders    TSH    BI mammo bilateral screening tomosynthesis      Hyperglycemia        Relevant Orders    Hemoglobin A1c      Screening mammogram for breast cancer        Relevant Orders    BI mammo bilateral screening tomosynthesis      Medication management        Relevant Orders    Drug Screen, Urine With Reflex to Confirmation            HPI this is a 64-year-old patient who had a history of hypertension.  Gastritis.  Anxiety.  Insomnia.  Had a 1.4 cm spiculated right upper lobe mass biopsy shows adenocarcinoma underwent for surgery robotic upper lobectomy with mediastinal lymph node dissection in March 24, 2025.  Postoperatively complaining of the radiculopathy neuromuscular pain affecting the right side of the chest wall    Review surgical note discussed with the patient patient going to be evaluated by the oncologist and pulmonary service    Today came complaint of the anxiety depression insomnia    Negative for hemoptysis hypoxia DVT or PE    Negative for fall    Patient hospitalized since last visit medication list reviewed and  reconciled with discharge medications    Advised not to smoke or drink    Hypertension amlodipine    Hyperlipidemia Lipitor    Depression Paxil    Neuropathy pain gabapentin B12 folic acid    Insomnia melatonin Ambien    Sent for the  urine tox screen x-ray blood test advised to follow-up with the pulmonary twice a year and oncology for any role of the chemotherapy after surgery or not had adenocarcinoma with negative lymph node  Medical History[1]  Surgical History[2]  Allergies[3]  Current Medications[4]  Family History[5]  Social History[6]  Immunization History   Administered Date(s) Administered    Flu vaccine (IIV4), preservative free *Check age/dose* 09/17/2017, 09/02/2018, 09/07/2019, 08/16/2020, 09/10/2021, 07/30/2022, 09/21/2023    Flu vaccine, trivalent, preservative free, age 6 months and greater (Fluarix/Fluzone/Flulaval) 10/08/2016    Flu vaccine, trivalent, preservative free, no egg protein, age 6 months or greater (Flucelvax) 09/18/2024    Hepatitis A vaccine, age 19 years and greater (HAVRIX) 07/25/2018, 04/08/2019    Hepatitis B vaccine, 18yrs and older(HEPLISAV) 08/16/2022, 05/12/2023    Hepatitis B vaccine, adult *Check Product/Dose* 08/16/2022    Influenza, Unspecified 12/20/2011, 09/21/2012, 09/23/2013, 09/10/2021    Influenza, seasonal, injectable 08/27/2020    MMR vaccine, subcutaneous (MMR II) 08/23/2022, 07/03/2023    Moderna COVID-19 vaccine, 12 years and older (50mcg/0.5mL)(Spikevax) 09/22/2023, 09/18/2024    Moderna SARS-CoV-2 Vaccination 02/11/2021, 03/10/2021, 10/29/2021, 04/18/2022    Pfizer COVID-19 vaccine, bivalent, age 12 years and older (30 mcg/0.3 mL) 09/17/2022, 05/12/2023    Pfizer Purple Cap SARS-CoV-2 09/17/2022, 08/19/2023    Pneumococcal Conjugate PCV 7 10/31/2020    Pneumococcal conjugate vaccine, 13-valent (PREVNAR 13) 10/31/2020    Pneumococcal conjugate vaccine, 20-valent (PREVNAR 20) 07/06/2022    Pneumococcal polysaccharide vaccine, 23-valent, age 2 years and older (PNEUMOVAX 23) 12/20/2011, 09/02/2018, 07/03/2023    Poliovirus vaccine, subcutaneous (IPOL) 08/17/2022, 07/03/2023    RESPIRATORY SYNCYTIAL VIRUS (RSV), ELIGIBLE PREGNANT PTS, 0.5 ML (ABRYSVO) 08/19/2023    Td vaccine, age 7 years and  "older (TENIVAC) 05/12/2023    Tdap vaccine, age 7 year and older (BOOSTRIX, ADACEL) 04/14/2019    Varicella vaccine, subcutaneous (VARIVAX) 07/03/2023    Zoster vaccine, recombinant, adult (SHINGRIX) 08/16/2020, 10/31/2020       Review of Systems  Review of systems is otherwise negative unless stated above or in history of present illness.    Objective   Visit Vitals  /74 (BP Location: Left arm, Patient Position: Sitting)   Pulse 69   Temp 36.1 °C (97 °F)   Ht 1.727 m (5' 8\")   Wt 58.5 kg (129 lb)   SpO2 96%   BMI 19.61 kg/m²   OB Status Postmenopausal   Smoking Status Former   BSA 1.68 m²     Physical Exam  Constitutional: Cachexia of chronic disease     General: not in acute distress.   HENT:      Head: Normocephalic and atraumatic.      Nose: Nose normal.   Eyes: No jaundice     Extraocular Movements: Extraocular movements intact.      Conjunctiva/sclera: Conjunctivae normal.   Cardiovascular: S4     Rate and Rhythm: Normal rate ,  No M/R/G  Pulmonary: Expiratory rhonchi     Effort: Pulmonary effort is normal.      Breath sounds: Normal, Bilat Equal AE  Skin: Clubbing of the nail     General: Skin is warm.   Neurological:      Mental Status: He is alert and oriented to person, place, and time.   Psychiatric:    Anxiety depression insomnia     Mood and Affect: Mood normal.         Behavior: Behavior normal.   Musculoskeletal   FROM in all extremitirs,  Joint-no swelling or tenderness    Admission on 03/24/2025, Discharged on 03/27/2025   Component Date Value Ref Range Status    ABO TYPE 03/24/2025 B   Final    Rh TYPE 03/24/2025 POS   Final    ANTIBODY SCREEN 03/24/2025 NEG   Final    ABO TYPE 03/24/2025 B   Final    Rh TYPE 03/24/2025 POS   Final    Case Report 03/24/2025    Final                    Value:Surgical Pathology                                Case: [id number]                                  Authorizing Provider:  Luis Dos Santos MD        Collected:           03/24/2025 1556            "   Ordering Location:     [facility]       Received:            03/24/2025 2208                                     [facility] OR                                                             Pathologist:           Moe Spear MD                                                              Specimens:   A) - LYMPH NODE PULMONARY (SPECIFY SITE), LEVEL 9 LYMPH NODE                                        B) - LYMPH NODE PULMONARY (SPECIFY SITE), LEVEL 8 LYMPH NODE                                        C) - LYMPH NODE PULMONARY (SPECIFY SITE), LEVEL 7 LYMPH NODE                                        D) - LYMPH NODE PULMONARY (SPECIFY SITE), LEVEL 11 LYMPH NODE                                       E) - LYMPH NODE PULMONARY (SPECIFY SITE), RIGHT PARATRACHEAL NODE                                                             F) - LYMPH NODE PULMONARY (SPECIFY SITE), LEVEL 12 LYMPH NODE                                       G) - LUNG LOBECTOMY/SEGMENTECTOMY RIGHT (SPECIFY SITE), RIGHT UPPER LOBE                   FINAL DIAGNOSIS 03/24/2025    Final                    Value:A. LYMPH NODE, LEVEL 9; BIOPSY:   -- One lymph node, negative for metastasis (0/1)    B. LYMPH NODE, LEVEL 8; BIOPSY:   -- One lymph node, negative for metastasis (0/1)    C. LYMPH NODE, LEVEL 7; BIOPSY:   -- One lymph node, negative for metastasis (0/1)    D. LYMPH NODE, LEVEL 11; BIOPSY:   -- One lymph node, negative for metastasis (0/1)    E. LYMPH NODE, PARATRACHEAL; BIOPSY:   -- One lymph node, negative for metastasis (0/1)    F. LYMPH NODE, LEVEL 12; BIOPSY:   -- One lymph node, negative for metastasis (0/1)    G. LUNG, RIGHT UPPER LOBE; LOBECTOMY:  -- Adenocarcinoma, solid and complex acinar patterns, invading into the hilar fat. See comment  -- Tumor size: 3.1 cm in greatest dimension  -- One lymph node, negative for metastasis (0/1)  -- Resection margins are free of tumor              03/24/2025    Final                    Value:By the  signature on this report, the individual or group listed as making the Final Interpretation/Diagnosis certifies that they have reviewed this case.       Comment 03/24/2025    Final                    Value:The tumor cells are positive for CK7 and TTF1 (focal and weak) immunostain, while they are negative for p40. VVG elastic stain is positive for visceral pleural invasion.      Case Summary Report 03/24/2025    Final                    Value:LUNG                            LUNG - All Specimens                            8th Edition - Protocol posted: 9/21/2022                                                        SPECIMEN                               Procedure:    Lobectomy                                Specimen Laterality:    Right                                                         TUMOR                               Tumor Focality:    Single focus                                Tumor Site:    Upper lobe of lung                                Tumor Size:                                     Total Tumor Size (size of entire tumor):    Greatest Dimension (Centimeters): 3.1 cm                                 Size of Invasive Component:    Greatest Dimension (Centimeters): 3.1 cm                               Histologic Type:    Invasive solid adenocarcinoma                                  Histologic Patterns Present:    Solid: 60                                  Histologic Patterns Present:    Complex glands (cribriform and fused glands): 40                                Histologic Grade:    G3, poorly differentiated                                Visceral Pleura Invasion:    Present                                Direct Invasion of Adjacent Structures:    Present                                  Involved Adjacent Structures:    Hilar soft tissues                                Treatment Effect:    No known presurgical therapy                                Lymphovascular Invasion:    Not identified                                                          MARGINS                               Margin Status for Invasive Carcinoma:    All margins negative for invasive carcinoma                                  Closest Margin(s) to Invasive Carcinoma:    Parenchymal                                  Distance from Invasive Carcinoma to Closest Margin:    0.5 cm                               Margin Status for Non-Invasive Tumor:    All margins negative for non-invasive tumor                                                         REGIONAL LYMPH NODES                               Lymph Node(s) from Prior Procedures:    No known prior lymph node sampling performed                                Regional Lymph Node Status:                                     :    All regional lymph nodes negative for tumor                                  Number of Lymph Nodes Examined:    7                                    Lizeth Site(s) Examined:    4R: Lower paratracheal                                    Lizeth Site(s) Examined:    8R: Para-esophageal (below timur)                                    Lizeth Site(s) Examined:    9R: Pulmonary ligament                                    Lizeth Site(s) Examined:    11R: Interlobar                                    Lizeth Site(s) Examined:    12R: Lobar                                    Lizeth Site(s) Examined:    13R: Segmental                                    Lizeth Site(s) Examined:    7: Subcarinal                                                         PATHOLOGIC STAGE CLASSIFICATION (pTNM, AJCC 8th Edition)                               Reporting of pT, pN, and (when applicable) pM categories is based on information available to the pathologist at the time the report is issued. As per the AJCC (Chapter 1, 8th Ed.) it is the managing physician’s responsibility to establish the final pathologic stage based upon all pertinent information, including but potentially not limited to this pathology  "report.                               pT Category:    pT2a                                pN Category:    pN0       Block for Additional Biomarkers/Mo* 03/24/2025    Final                    Value:Normal Block: G9  Tumor Block: G3      Clinical History 03/24/2025    Final                    Value:Pre-op diagnosis:  Malignant neoplasm of upper lobe of right lung (Multi) [C34.11]      Gross Description 03/24/2025    Final                    Value:A. Received  in formalin, labeled with the patient's name and hospital number and \"A, LYMPH NODE\", is a, possible lymph node measuring, 0.6 X 0.4 X 0.2 cm. The specimen is entirely submitted in one cassette.  EXO    B. Received  in formalin, labeled with the patient's name and hospital number and \"B, LYMPH NODE\", is a, possible lymph node measuring, 1.1 X 0.5 X 0.2 cm. The specimen is entirely submitted in one cassette.  EXO     C. Received  in formalin, labeled with the patient's name and hospital number and \"C, LYMPH NODE\", is a, possible lymph node measuring, 0.7 X 0.4 X 0.2 cm. The specimen is entirely submitted in one cassette.  EXO     D. Received  in formalin, labeled with the patient's name and hospital number and \"D, LYMPH NODE\", is a, possible lymph node measuring, 1.4 X 0.6 X 0.4 cm. The specimen is entirely submitted in one cassette.  EXO     E. Received  in formalin, labeled with the patient's name and hospital number and \"E, LYMPH NODE\", is a, possible lymph node                           measuring, 0.6 X 0.4 X 0.2 cm. The specimen is entirely submitted in one cassette.  EXO     F. Received  in formalin, labeled with the patient's name and hospital number and \"F, LYMPH NODE\", is a, possible lymph node measuring, 1.2 X 0.6 X 0.4cm. The specimen is entirely submitted in one cassette.  EXO     G. Received  in formalin, labeled with the patient´s name and number and \"G, lung lobectomy\", is a lung lobe.  The specimen measures 16.5 x 12.8 x 3.7 cm, and weighs 173.45 " g. The pleura is focally thickened and puckered with fibrous adhesions over a palpable mass.  The pleural surface overlying the mass is inked yellow. The remainder of the pleura is red-gray, mottled lightly, with black streaks, smooth, and glistening.     The bronchial resection margin is identified. The bronchus measures 0.5 cm in length and 2.2 cm in diameter. There are 2 stapled parenchymal margins measuring 4.8 and 3.5 cm in length.  The staple line is shaved and the parenchymal resection margin is inked blue.                            The vascular margins are identified. The artery measures 0.5 cm in length, and 0.6 cm in diameter. The vein measures 0.5 cm in length, and 0.6 in diameter. The bronchial and vascular margins are shaved.    The bronchus is opened and is unremarkable. The specimen is serially sectioned to reveal a 3.1 x 2.7 x 2.5 cm poorly demarcated, gray-white firm parenchymal mass (slices 11-14).    The mass is 0.5 cm from the parenchymal margin, 1.0 cm from the bronchial margin, and 0.7 cm from the vascular margin.     There is a firm tan-white irregular firm area underlying the inked fibrous pleura measuring 1.8 x 1.5 x 1.1 (slice 12-14). The lesion measures 2.7 cm from the primary lesion. There is consolidation of the area between the lesion and the irregular firm area.    The remainder of the lung parenchyma reveals a red-pink and unremarkable cut surface.    No definitive vascular involvement is demonstrated in large vessels.  Multiple possible anthracotic peribronchial lymph nodes are                           identified, ranging from 0.2 cm to 0.3 cm.  Photographs have been taken. Representative sections are submitted in 11 cassettes.  EXO    Summary of Cassettes:  Specimen Label Site  G  1 Bronchial margin en face    2 Vascular margins    3 Mass to closest inked parenchymal margin (slice 12) see snaggit    4-6 Mass to puckered pleural to include irregular firm area and consolidation  (trisected and inked for orientation) see snaggit    7-8 Representative sections of mass (slice 14 and 11) see snaggit    9 Uninvolved parenchyma    10 Lesion with possible lymph node (slice 13)    11 3 intact possible peribronchial lymph nodes          Disclaimer 03/24/2025    Final                    Value:One or more of the reagents used to perform assays on this specimen MAY have contained components considered to be analyte specific reagents (ASR's).  ASR's have not been cleared or approved by the U.S. Food and Drug Administration.  These assays were developed and their performance characteristics determined by the Department of Pathology at [facility]. The FDA does not require this test to go through premarket FDA review. This test is used for clinical purposes. It should not be regarded as investigational or for research. This laboratory is certified under the Clinical Laboratory Improvement Amendments (CLIA) as qualified to perform high complexity clinical laboratory testing.  The assays were performed with appropriate positive and negative controls which stained appropriately.      POCT pH, Arterial 03/24/2025 7.18 (LL)  7.38 - 7.42 pH Final    POCT pCO2, Arterial 03/24/2025 67 (H)  38 - 42 mm Hg Final    POCT pO2, Arterial 03/24/2025 129 (H)  85 - 95 mm Hg Final    POCT SO2, Arterial 03/24/2025 99  94 - 100 % Final    POCT Oxy Hemoglobin, Arterial 03/24/2025 97.5  94.0 - 98.0 % Final    POCT Hematocrit Calculated, Arteri* 03/24/2025 41.0  36.0 - 46.0 % Final    POCT Sodium, Arterial 03/24/2025 139  136 - 145 mmol/L Final    POCT Potassium, Arterial 03/24/2025 4.0  3.5 - 5.3 mmol/L Final    POCT Chloride, Arterial 03/24/2025 104  98 - 107 mmol/L Final    POCT Ionized Calcium, Arterial 03/24/2025 1.18  1.10 - 1.33 mmol/L Final    POCT Glucose, Arterial 03/24/2025 154 (H)  74 - 99 mg/dL Final    POCT Lactate, Arterial 03/24/2025 0.9  0.4 - 2.0 mmol/L Final    POCT Base  Excess, Arterial 03/24/2025 -4.7 (L)  -2.0 - 3.0 mmol/L Final    POCT HCO3 Calculated, Arterial 03/24/2025 25.0  22.0 - 26.0 mmol/L Final    POCT Hemoglobin, Arterial 03/24/2025 13.7  12.0 - 16.0 g/dL Final    POCT Anion Gap, Arterial 03/24/2025 14  10 - 25 mmo/L Final    Patient Temperature 03/24/2025 37.0  degrees Celsius Final    FiO2 03/24/2025 100  % Final    WBC 03/25/2025 15.2 (H)  4.4 - 11.3 x10*3/uL Final    nRBC 03/25/2025 0.0  0.0 - 0.0 /100 WBCs Final    RBC 03/25/2025 3.30 (L)  4.00 - 5.20 x10*6/uL Final    Hemoglobin 03/25/2025 11.0 (L)  12.0 - 16.0 g/dL Final    Hematocrit 03/25/2025 34.6 (L)  36.0 - 46.0 % Final    MCV 03/25/2025 105 (H)  80 - 100 fL Final    MCH 03/25/2025 33.3  26.0 - 34.0 pg Final    MCHC 03/25/2025 31.8 (L)  32.0 - 36.0 g/dL Final    RDW 03/25/2025 12.8  11.5 - 14.5 % Final    Platelets 03/25/2025 328  150 - 450 x10*3/uL Final    Glucose 03/25/2025 130 (H)  74 - 99 mg/dL Final    Sodium 03/25/2025 140  136 - 145 mmol/L Final    Potassium 03/25/2025 4.3  3.5 - 5.3 mmol/L Final    Chloride 03/25/2025 104  98 - 107 mmol/L Final    Bicarbonate 03/25/2025 24  21 - 32 mmol/L Final    Anion Gap 03/25/2025 16  10 - 20 mmol/L Final    Urea Nitrogen 03/25/2025 16  6 - 23 mg/dL Final    Creatinine 03/25/2025 0.59  0.50 - 1.05 mg/dL Final    eGFR 03/25/2025 >90  >60 mL/min/1.73m*2 Final    Calcium 03/25/2025 8.4 (L)  8.6 - 10.6 mg/dL Final    Magnesium 03/25/2025 2.62 (H)  1.60 - 2.40 mg/dL Final    WBC 03/26/2025 10.7  4.4 - 11.3 x10*3/uL Final    nRBC 03/26/2025 0.0  0.0 - 0.0 /100 WBCs Final    RBC 03/26/2025 3.07 (L)  4.00 - 5.20 x10*6/uL Final    Hemoglobin 03/26/2025 10.2 (L)  12.0 - 16.0 g/dL Final    Hematocrit 03/26/2025 32.1 (L)  36.0 - 46.0 % Final    MCV 03/26/2025 105 (H)  80 - 100 fL Final    MCH 03/26/2025 33.2  26.0 - 34.0 pg Final    MCHC 03/26/2025 31.8 (L)  32.0 - 36.0 g/dL Final    RDW 03/26/2025 13.0  11.5 - 14.5 % Final    Platelets 03/26/2025 282  150 - 450 x10*3/uL  Final    Glucose 03/26/2025 112 (H)  74 - 99 mg/dL Final    Sodium 03/26/2025 137  136 - 145 mmol/L Final    Potassium 03/26/2025 4.1  3.5 - 5.3 mmol/L Final    Chloride 03/26/2025 100  98 - 107 mmol/L Final    Bicarbonate 03/26/2025 28  21 - 32 mmol/L Final    Anion Gap 03/26/2025 13  10 - 20 mmol/L Final    Urea Nitrogen 03/26/2025 17  6 - 23 mg/dL Final    Creatinine 03/26/2025 0.59  0.50 - 1.05 mg/dL Final    eGFR 03/26/2025 >90  >60 mL/min/1.73m*2 Final    Calcium 03/26/2025 8.3 (L)  8.6 - 10.6 mg/dL Final    Magnesium 03/26/2025 2.25  1.60 - 2.40 mg/dL Final    WBC 03/27/2025 7.0  4.4 - 11.3 x10*3/uL Final    nRBC 03/27/2025 0.0  0.0 - 0.0 /100 WBCs Final    RBC 03/27/2025 3.02 (L)  4.00 - 5.20 x10*6/uL Final    Hemoglobin 03/27/2025 9.9 (L)  12.0 - 16.0 g/dL Final    Hematocrit 03/27/2025 31.2 (L)  36.0 - 46.0 % Final    MCV 03/27/2025 103 (H)  80 - 100 fL Final    MCH 03/27/2025 32.8  26.0 - 34.0 pg Final    MCHC 03/27/2025 31.7 (L)  32.0 - 36.0 g/dL Final    RDW 03/27/2025 12.6  11.5 - 14.5 % Final    Platelets 03/27/2025 284  150 - 450 x10*3/uL Final    Glucose 03/27/2025 97  74 - 99 mg/dL Final    Sodium 03/27/2025 136  136 - 145 mmol/L Final    Potassium 03/27/2025 3.9  3.5 - 5.3 mmol/L Final    Chloride 03/27/2025 101  98 - 107 mmol/L Final    Bicarbonate 03/27/2025 28  21 - 32 mmol/L Final    Anion Gap 03/27/2025 11  10 - 20 mmol/L Final    Urea Nitrogen 03/27/2025 10  6 - 23 mg/dL Final    Creatinine 03/27/2025 0.44 (L)  0.50 - 1.05 mg/dL Final    eGFR 03/27/2025 >90  >60 mL/min/1.73m*2 Final    Calcium 03/27/2025 8.4 (L)  8.6 - 10.6 mg/dL Final    Magnesium 03/27/2025 2.07  1.60 - 2.40 mg/dL Final   Hospital Outpatient Visit on 03/05/2025   Component Date Value Ref Range Status    POCT pH, Arterial 03/05/2025 7.47 (H)  7.38 - 7.42 pH Final    POCT pCO2, Arterial 03/05/2025 32 (L)  38 - 42 mm Hg Final    POCT pO2, Arterial 03/05/2025 89  85 - 95 mm Hg Final    POCT SO2, Arterial 03/05/2025 99  94 -  100 % Final    POCT Oxy Hemoglobin, Arterial 03/05/2025 95.8  94.0 - 98.0 % Final    POCT Base Excess, Arterial 03/05/2025 0.3  -2.0 - 3.0 mmol/L Final    POCT HCO3 Calculated, Arterial 03/05/2025 23.3  22.0 - 26.0 mmol/L Final    POCT Hemoglobin, Arterial 03/05/2025 12.9  12.0 - 16.0 g/dL Final    POCT Carboxyhemoglobin, Arterial 03/05/2025 1.6  % Final    POCT Methemoglobin, Arterial 03/05/2025 1.1  0.0 - 1.5 % Final    POCT Deoxy Hemoglobin, Arterial 03/05/2025 1.5  0.0 - 5.0 % Final    Patient Temperature 03/05/2025 37.0  degrees Celsius Final    FiO2 03/05/2025 21  % Final    Site of Arterial Puncture 03/05/2025 RT RAD   Final    Natan's Test 03/05/2025 POS   Final   Hospital Outpatient Visit on 02/26/2025   Component Date Value Ref Range Status    Case Report 02/26/2025    Final                    Value:Non-gynecologic Cytology                          Case: [id number]                                   Authorizing Provider:  Heaven RAMOS MD        Collected:           02/26/2025 0808              Ordering Location:     [facility]       Received:            02/26/2025 [facility]                                     Center                                                                       Pathologist:           Sierra Payne MD                                                         Specimens:   A) - BRONCHIAL BRUSH RIGHT UPPER LOBE                                                               B) - LYMPH NODE 11 L PULMONARY FINE NEEDLE ASPIRATION                                               C) - LYMPH NODE 7 PULMONARY FINE NEEDLE ASPIRATION                                                  D) - LYMPH NODE 11 Rs PULMONARY FINE NEEDLE ASPIRATION                                              E) - LUNG FINE NEEDLE ASPIRATION RIGHT UPPER LOBE, RUL nodule                              Final Cytological Interpretation 02/26/2025    Final                    Value:  A. BRONCHIAL BRUSH RIGHT UPPER LOBE          Nondiagnostic specimen due to insufficient cellularity   Bronchial cells present.only            B. LYMPH NODE 11 L PULMONARY FINE NEEDLE ASPIRATION          No malignant cells identified      Lymphoid sample is present       C. LYMPH NODE 7 PULMONARY FINE NEEDLE ASPIRATION          No malignant cells identified      Lymphoid sample is present       D. LYMPH NODE 11 Rs PULMONARY FINE NEEDLE ASPIRATION          No malignant cells identified      Lymphoid sample is present       E. LUNG FINE NEEDLE ASPIRATION RIGHT UPPER LOBE - RUL nodule         Malignant cells derived from adenocarcinoma, see note.    Note: The malignant cells are positive for TTF-1 and negative for p40.     Molecular testing has been ordered and results will be issued in a separate report.  The cell block contains high tumor cellularity representing roughly 40% of all nucleated cells.             02/26/2025    Final                    Value:Slide(s) initially screened by KANA Beltrán at [address] [address]   By the signature on this report, the individual or group listed as making the Final Interpretation/Diagnosis certifies that they have reviewed this case.       Rapid Onsite Evaluation 02/26/2025    Final                    Value:A. BRONCHIAL BRUSH RIGHT UPPER LOBE.  Brushing Immediate Read Result:  Pass 1:  Bronchial cells, negative    Pathologist: Dr. Moe Spear  Date:2/26/2025 at 11:15 am    Evaluation performed at:   [facility]  Department of Pathology  [address] [place] [address]  Phone: [phone] Fax: [phone]     B. LYMPH NODE 11 L PULMONARY FINE NEEDLE ASPIRATION.  Rapid On-site Evaluation Read Result:  Pass 1:  Lymphoid, negative    Pathologist: Dr. Moe Spear Called Date/Time: 2/26/2025 at 11:15 am    Evaluation performed at:   [facility]  Department of Pathology  [address] [place] [address]  [place], [place] [address]  Phone: [phone] Fax: [phone]     C. LYMPH NODE 7 PULMONARY FINE NEEDLE ASPIRATION.  Rapid On-site Evaluation Read Result:  Pass 1:  Lymphoid, negative    Pathologist: Dr. Moe Spear Called Date/Time: 2/26/2025 at 11:15 am    Evaluation performed at:   [facility]  Department of Pathology  [address] [place] [address]  Phone: [phone] Fax: [phone]     D. LYMPH NODE 11 Rs PULMONARY FINE NEEDLE ASPIRATION.  Rapid On-site Evaluation Read Result:  Pass 1:  Lymphoid, negative    Pathologist: Dr. Moe Spear Called Date/Time: 2/26/2025 at 11:15 am    Evaluation performed at:   [facility]  Department of Pathology  [address] [place] [address]  Phone: [phone] Fax: [phone]     E. LUNG FINE NEEDLE ASPIRATION RIGHT UPPER LOBE.  Rapid On-site Evaluation Read Result:  Pass 1:  Malignant, non small cell carcinoma    Pathologist: Dr. Moe Spear Called Date/Time: 2/26/2025 at 11:15 am    Evaluation performed at:   [facility]  Department of Pathology  [address] [place] [address]  Phone: [phone] Fax: [phone]         Clinical History 02/26/2025    Final                    Value:Right upper lobe mass      Specimen Description 02/26/2025    Final                    Value:A. BRONCHIAL BRUSH RIGHT UPPER LOBE.  Received 2 direct smears (1 air-dried Diff-Quik and 1 spray-fixed) and 1 brush in 30 ml colorless clear in Cytolyt with particles.     B. LYMPH NODE 11 L PULMONARY FINE NEEDLE ASPIRATION.  Received 2 direct smears (1 air-dried Diff-Quik and 1 spray-fixed) and 35 ml red cloudy needle rinse in Cytolyt with particles.     C. LYMPH NODE 7 PULMONARY FINE NEEDLE ASPIRATION.  Received 2 direct smears (1 air-dried Diff-Quik and 1 spray-fixed) and 30 ml red cloudy  needle rinse in Cytolyt with particles.     D. LYMPH NODE 11 Rs PULMONARY FINE NEEDLE ASPIRATION.  Received 2 direct smears (1 air-dried Diff-Quik and 1 spray-fixed) and 40 ml red cloudy needle rinse in Cytolyt with particles.     E. LUNG FINE NEEDLE ASPIRATION RIGHT UPPER LOBE.  Received 2 direct smears (1 air-dried Diff-Quik and 1 spray-fixed) and 30 ml red cloudy needle rinse in Cytolyt with particles.         Specimen Processing Detail 02/26/2025    Final                    Value:A1 Slides Only (No Block)   A1-1 Diff Quik Stain Smear NGYN   A1-2 Pap Stain Smear NGYN   A2 Cell Block   A2-1 H&E   B1 Slides Only (No Block)   B1-1 Diff Quik Stain Smear NGYN   B1-2 Pap Stain Smear NGYN   B2 Cell Block   B2-1 H&E   C1 Slides Only (No Block)   C1-1 Diff Quik Stain Smear NGYN   C1-2 Pap Stain Smear NGYN   C2 Cell Block   C2-1 H&E   D1 Slides Only (No Block)   D1-1 Diff Quik Stain Smear NGYN   D1-2 Pap Stain Smear NGYN   D2 Cell Block   D2-1 H&E   E1 Slides Only (No Block)   E1-1 Diff Quik Stain Smear NGYN   E1-2 Pap Stain Smear NGYN   E2 Cell Block   E2-1 H&E   E2-2 IHC TTF1 [id number]   E2-3 LOG IHC   E2-4 IHC P40   E2-5 LOG IHC       Disclaimer 02/26/2025    Final                    Value:One or more of the reagents used to perform assays on this specimen MAY have contained components considered to be analyte specific reagents (ASR's).  ASR's have not been cleared or approved by the U.S. Food and Drug Administration.  These assays were developed and their performance characteristics determined by the Department of Pathology at [facility]. The FDA does not require this test to go through premarket FDA review. This test is used for clinical purposes. It should not be regarded as investigational or for research. This laboratory is certified under the Clinical Laboratory Improvement Amendments (CLIA) as qualified to perform high complexity clinical laboratory testing.  The assays were  performed with appropriate positive and negative controls which stained appropriately.      Case Report 02/26/2025    Final                    Value:Surgical Pathology                                Case: [id number]                                  Authorizing Provider:  Heaven RAMOS MD        Collected:           02/26/2025 1059              Ordering Location:     [facility]       Received:            02/26/2025 1447                                     Center                                                                       Pathologist:           Ceci Chandler DO                                                          Specimen:    LUNG BIOPSY RIGHT (SPECIFY SITE), RUL nodule  core biospy                                  FINAL DIAGNOSIS 02/26/2025    Final                    Value:   A.  LUNG, RIGHT UPPER LOBE, CORE BIOPSY:  - Fragments of poorly differentiated non-small cell carcinoma, favor adenocarcinoma, see note.  - See concurrent cytology specimen ([id number]).    Note: Tumor cells are positive for CK7 and TTF-1 (8G7G3). One rare cluster of cells co-expresses p40.  The morphologic and immunohistochemical findings support the above diagnosis.  PD-L1 immunostain results are reported below. Molecular studies will be performed on the concurrent cytology specimen.    Tumor Block:  A1  Preliminary Assessment of Specimen Adequacy for Ancillary Testing:  Adequate   Viable tumor nuclei: 25%    : Dr. Moe Spear.         02/26/2025    Final                    Value:By the signature on this report, the individual or group listed as making the Final Interpretation/Diagnosis certifies that they have reviewed this case.       Comment 02/26/2025    Final                    Value:PD-L1 22C3  by Immunohistochemistry with Interpretation, pembrolizumab  (KEYTRUDA)    Block used:  A1    Interpretation: Low Expression    Tumor Proportion Score (TPS): 40%      Intended use:  PD-L1 22C3 by IHC  with Interpretation is a qualitative immunohistochemical assay using Monoclonal Mouse Anti-PD-L1, Clone 22C3 intended for use in the detection of PD-L1 protein in formalin-fixed, paraffin-embedded (FFPE) non-small cell lung cancer (NSCLC) tissue using the Optiview detection on a [place] BenchMark Ultra. The specimen submitted for testing should contain at least 100 viable tumor cells to be considered adequate for evaluation. This assay is indicated as an aid in identifying NSCLC patients for treatment with pembrolizumab (KEYTRUDA).    Methodology:  PD-L1 protein expression is determined by using Tumor Proportion Score (TPS), which is the percentage of viable tumor cells showing partial or complete membrane staining at any intensity. In the clinical setting of first-line therapy                           (treatment-naïve patients), the specimen is considered PD-L1 positive if the TPS is equal to or greater than 50 percent. In the setting of second-line therapy, the specimen is considered positive if the TPS is equal to or greater than 1 percent.    Reference Range:  High Expression >=50% TPS  Low Expression 1-49% TPS  No Expression <1% TPS    This assay is validated and FDA-approved for lung cancer specimens only. For all other specimen types, results should be interpreted with caution and within the appropriate clinical context. The use of this assay on decalcified tissues has not been validated and is not recommended.   One or more of the reagents used to perform assays on this specimen MAY have contained components considered to be Laboratory Developed Tests (LDT).  LDT's have not been cleared or approved by the U.S. Food and Drug Administration.  These assays/tests were developed and their performance characteristics determined by the Department of Pathology Immunohistochemistry                           Lab at [facility]. The FDA does not require this test to go through  "premarket FDA review. This test is used for clinical purposes. It should not be regarded as investigational or for research. This laboratory is certified under the Clinical Laboratory Improvement Amendments (CLIA) as qualified to perform high complexity clinical laboratory testing.  The assays/tests were performed with appropriate positive and negative controls which stained appropriately.       Gross Description 02/26/2025    Final                    Value:A: Received in formalin, labeled with the patient´s name and hospital number and \"RUL nodule core biopsy\", are multiple minute dark-red, soft tissue fragments aggregating to 0.8 x 0.8 x 0.2 cm. The specimen is submitted in toto in one cassette.  [facility]        Disclaimer 02/26/2025    Final                    Value:One or more of the reagents used to perform assays on this specimen MAY have contained components considered to be analyte specific reagents (ASR's).  ASR's have not been cleared or approved by the U.S. Food and Drug Administration.  These assays were developed and their performance characteristics determined by the Department of Pathology at [facility]. The FDA does not require this test to go through premarket FDA review. This test is used for clinical purposes. It should not be regarded as investigational or for research. This laboratory is certified under the Clinical Laboratory Improvement Amendments (CLIA) as qualified to perform high complexity clinical laboratory testing.  The assays were performed with appropriate positive and negative controls which stained appropriately.      Focused Solid Tumor DNA/RNA Results 02/26/2025    Final                    Value:Specimen: Nain, [id number] E1  Estimated Tumor Content: 40%    MICROSATELLITE STATUS: Microsatellite Instability-High (MSI-H) is NOT DETECTED.      DISEASE ASSOCIATED GENOMIC FINDINGS:   KEAP1 p.G417W ([id number] c.1248_1249delinsTT)  KRAS p.G12C " ([id number] c.34G>T)  TP53 p.R249M ([id number] c.746G>T)  TP53 p.R158L ([id number] c.473G>T)    INTERPRETATION AND POTENTIAL THERAPEUTIC OPTIONS:  KRAS p.G12C  FDA RECOMMENDATIONS (Advanced Non Small Cell Lung Cancer):  adagrasib (Subsequent)  sotorasib (Subsequent)    DISEASE RELEVANT ALTERATIONS NOT DETECTED:   Negative for ALK fusion.  Negative for BRAF V600E.  Negative for EGFR sensitizing mutation.  Negative for ERBB2 activating mutation  Negative for MET exon 14 skipping mutation.  Negative for NTRK fusion.  Negative for RET fusion.  Negative for ROS1 fusion.      DISCLAIMER:   This assay is designed to detect targeted clinically-relevant single nucleotide variants, insertion and deletions (<30bp), whole gene high copy number                           amplifications, and translocations in a select group of genes. This assay does not distinguish between somatic and germline alterations in analyzed regions. A negative result (mutation not identified) does not rule out the presence of a mutation below the limit of detection of this assay due to low neoplastic cell content, tumor heterogeneity, or the presence of additional mutations in the listed genes which are outside of the target regions in this assay. General population polymorphisms, promoter, synonymous and intronic variants (with the exception of splice variants) are not generally included in this report. The MSI-H/[place] designation is based on analysis of up to 10 mononucleotide repeat loci and not based on the 5 or 7 MSI described in current clinical practice guidelines. The threshold for MSI-H/AU[place]USTA was determined by analytical concordance to dMMR IHC assays using mainly colorectal and uterine FFPE tissue. The clinical validity of the qualitative MSI designation has not been                           established.    Identification or absence of cancer-associated mutations does not necessarily indicate a response to therapy.  Decisions on patient care and  treatment must be based on the independent medical judgment of the treating physician, taking into account all applicable information concerning the patient's condition such as clinical and histopathologic findings, other laboratory findings, and patient preferences. This report includes information from public sources, including scientific and medical literature to better characterize the significance of alterations detected.    This laboratory developed test was developed and its analytical performance characteristics have been determined by [facility]. This test has not been cleared or approved by the FDA; however, the FDA has determined that such approval is not necessary. The [facility] is certified under the Clinical Laboratory Improvement Amendments of 1988 (CLIA-88) as qualified to perform high complexity                           testing.    PANEL GENE LIST:  Hotspot Mutations: AKT1, ALK, APC, ARAF, B2M, BRAF, CCND1, CDK4, CDKN2A, CREBBP, CTNNB1, EGFR, , ERBB2, ERBB3, ERBB4, ESR1, FBXW7, FGFR2, FGFR3, GNA11, GNAQ, H3F3A, LKSN2R9M, HRAS, IDH1, IDH2, JAK1, JAK2, JAK3, KEAP1, KIT, KRAS, MAP2K1, MAP2K2, MET, MTOR, NFE2L2, NRAS, PDGFRA, PIK3CA, POLE, PTEN, RET, ROS1, SMAD4, STK11, SMO, TP53, U2AF1.  Copy Number Variants: ALK, AR, BRAF, CCND1, CDK4, CDK6, EGFR, ERBB2, FGFR1, FGFR2, FGFR3, FGFR4, KIT, KRAS, MET, MYC, MYCN, PDGFRA, PIK3CA.  Fusion Drivers: ABL1, ALK, AKT3, KLAUDIA, BRAF, EGFR, ERBB2, ERG, ETV1, ETV4, ETV5, FGFR1, FGFR2, FGFR3, MET, NTRK1, NTRK2, NTRK3, PDGFRA, PPARG, RAF1, RET, ROS1.    MSI Status Markers: mononucleotide repeat loci.  Not all exons of all genes are sequenced. Genome assembly (hg19) was used for alignment and variant calling.      Electronically signed and reported* 02/26/2025    Final                    Value:Giovany Thakkar MD PhD     Office Visit on 02/12/2025   Component Date Value Ref Range Status    Glucose 02/12/2025 102 (H)  74 - 99 mg/dL Final    Sodium 02/12/2025  139  136 - 145 mmol/L Final    Potassium 02/12/2025 4.2  3.5 - 5.3 mmol/L Final    Chloride 02/12/2025 101  98 - 107 mmol/L Final    Bicarbonate 02/12/2025 30  21 - 32 mmol/L Final    Anion Gap 02/12/2025 12  10 - 20 mmol/L Final    Urea Nitrogen 02/12/2025 7  6 - 23 mg/dL Final    Creatinine 02/12/2025 0.69  0.50 - 1.05 mg/dL Final    eGFR 02/12/2025 >90  >60 mL/min/1.73m*2 Final    Calcium 02/12/2025 9.3  8.6 - 10.3 mg/dL Final    WBC 02/12/2025 7.8  4.4 - 11.3 x10*3/uL Final    nRBC 02/12/2025 0.0  0.0 - 0.0 /100 WBCs Final    RBC 02/12/2025 3.65 (L)  4.00 - 5.20 x10*6/uL Final    Hemoglobin 02/12/2025 12.3  12.0 - 16.0 g/dL Final    Hematocrit 02/12/2025 37.1  36.0 - 46.0 % Final    MCV 02/12/2025 102 (H)  80 - 100 fL Final    MCH 02/12/2025 33.7  26.0 - 34.0 pg Final    MCHC 02/12/2025 33.2  32.0 - 36.0 g/dL Final    RDW 02/12/2025 12.2  11.5 - 14.5 % Final    Platelets 02/12/2025 274  150 - 450 x10*3/uL Final    Protime 02/12/2025 10.5  9.8 - 12.8 seconds Final    INR 02/12/2025 0.9  0.9 - 1.1 Final   Orders Only on 01/30/2025   Component Date Value Ref Range Status    [facility] H[facility] O2 Saturation 01/30/2025 80.6  % Final    [facility] FIO2 01/30/2025 21  % Final    [facility] PCO2 01/30/2025 36.5  35.0 - 45.0 mmHg Final    [facility] Pressure Support. 01/30/2025 0  cmH20 Final    [facility] TEMP 01/30/2025 37.0  <=37.0 degC Final    [facility] RATE 01/30/2025 0  bpm Final    [facility] Ventilation 01/30/2025 Rm Air   Final    [facility] Type of Specimen 01/30/2025 RR Art   Final    [facility] PO2/FiO2 Ratio 01/30/2025 198 (L)  300 - 500 Final    [facility] PO2 01/30/2025 41.6 (AA)  80.0 - 100.0 mmHg Final    [facility] iPAP 01/30/2025 0  cmH20 Final    [facility] HIE Base Excess 01/30/2025 0.1  mmol/L Final    [facility] O2 L/M 01/30/2025 0.0   Final    [facility] pH 01/30/2025 7.436  7.350 - 7.450 Final    [facility] PEEP 01/30/2025 0.0  cmH20 Final    [facility] H[facility] VT 01/30/2025 0  mL Final    [facility] H[facility] MATA TEST  01/30/2025 Yes   Final    [facility] H[facility] HCO3 01/30/2025 24.0  22.0 - 26.0 mmol/L Final    [facility] H[facility] ePAP 01/30/2025 0  cmH20 Final       Radiology: Reviewed imaging in powerchart.  Imaging  No results found.    Cardiology, Vascular, and Other Imaging  No other imaging results found for the past 7 days        Charting was completed using voice recognition technology and may include unintended errors.              [1]   Past Medical History:  Diagnosis Date    Anxiety disorder, unspecified 03/19/2020    Anxiety and depression    Bipolar disorder, currently in remission, most recent episode unspecified (Multi) 03/19/2020    History of depressed bipolar disorder    COPD (chronic obstructive pulmonary disease) (Multi)     Displaced fracture of head of unspecified radius, initial encounter for closed fracture 11/30/2021    Radial head fracture    Dorsalgia, unspecified 03/19/2020    Chronic back pain greater than 3 months duration    Encounter for screening for malignant neoplasm of colon 12/15/2020    Screen for colon cancer    Encounter for screening for malignant neoplasm of rectum 12/15/2020    Screening for rectal cancer    Essential (primary) hypertension 03/19/2020    Benign essential hypertension    Hematuria, unspecified 11/30/2021    Painless hematuria    Hypothyroidism, unspecified 03/19/2020    Acquired hypothyroidism    Nicotine dependence, cigarettes, uncomplicated 10/29/2020    Continuous dependence on cigarette smoking    Pain in right arm 03/21/2017    Arm pain, right    Pain in unspecified shoulder 03/19/2020    Chronic pain in shoulder    Personal history of other diseases of the circulatory system 03/19/2020    History of hypertension    Personal history of other diseases of the digestive system 03/19/2020    History of gastroesophageal reflux (GERD)    Personal history of other diseases of the musculoskeletal system and connective tissue 10/29/2020    History of arthritis    Personal history of other  diseases of the nervous system and sense organs 11/30/2021    History of restless legs syndrome    Personal history of other diseases of the nervous system and sense organs 03/19/2020    History of restless legs syndrome    Personal history of other diseases of the nervous system and sense organs 03/19/2020    History of restless legs syndrome    Personal history of other diseases of the respiratory system 10/13/2022    History of acute bronchitis    Personal history of other diseases of the respiratory system 10/11/2022    History of acute sinusitis    Personal history of other mental and behavioral disorders 08/08/2016    History of anxiety    Personal history of other specified conditions 11/30/2021    History of insomnia    Personal history of other venous thrombosis and embolism 03/19/2020    History of deep venous thrombosis    Personal history of pulmonary embolism 03/19/2020    History of pulmonary embolism    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      albuterol 90 mcg/actuation inhaler inhale two puffs by mouth every 4 hours if needed for wheezing 8.5 g 3    amLODIPine (Norvasc) 5 mg tablet Take 1 tablet (5 mg) by mouth once daily. 90 tablet 3    fluticasone-umeclidin-vilanter (Trelegy Ellipta) 100-62.5-25 mcg blister with device Inhale 1 puff once daily. 60 each 6    gabapentin (Neurontin) 100 mg capsule Take 1  capsule (100 mg) by mouth 3 times a day. 90 capsule 1    guaiFENesin (Mucinex) 600 mg 12 hr tablet Take 1 tablet (600 mg) by mouth once daily as needed for cough.      ibuprofen 600 mg tablet Take 1 tablet (600 mg) by mouth every 6 hours. (Patient taking differently: Take 1 tablet (600 mg) by mouth 2 times a day.) 20 tablet 0    ipratropium-albuteroL (Duo-Neb) 0.5-2.5 mg/3 mL nebulizer solution INHALE THE CONTENTS OF 1 VIAL (3 ML) VIA NEBULIZER EVERY 12 HOURS AS NEEDED      metoprolol succinate XL (Toprol-XL) 25 mg 24 hr tablet Take 1 tablet (25 mg) by mouth once daily. Do not crush or chew. 90 tablet 3    PARoxetine (Paxil) 10 mg tablet Take 1 tablet (10 mg) by mouth once daily at bedtime. 90 tablet 3    clotrimazole-betamethasone (Lotrisone) cream Apply 1 Application topically 2 times a day. 45 g 1    fluticasone (Flonase) 50 mcg/actuation nasal spray Administer 1 spray into each nostril once daily. Shake gently. Before first use, prime pump. After use, clean tip and replace cap. 16 g 11    pantoprazole (ProtoNix) 40 mg EC tablet Take 1 tablet (40 mg) by mouth once daily. 90 tablet 3    zolpidem (Ambien) 10 mg tablet Take 1 tablet (10 mg) by mouth as needed at bedtime for sleep. 30 tablet 2     No current facility-administered medications for this visit.   [5]   Family History  Problem Relation Name Age of Onset    Hypertension Mother      Heart disease Mother      Heart attack Father     [6]   Social History  Socioeconomic History    Marital status:    Tobacco Use    Smoking status: Former     Current packs/day: 0.00     Average packs/day: 1.5 packs/day for 50.8 years (76.3 ttl pk-yrs)     Types: Cigarettes     Start date:      Quit date: 2024     Years since quittin.5    Smokeless tobacco: Never    Tobacco comments:     Quit in '   Substance and Sexual Activity    Alcohol use: Yes     Alcohol/week: 0.0 - 4.0 standard drinks of alcohol    Drug use: Never     Social Drivers of Health      Transportation Needs: No Transportation Needs (4/22/2025)    OASIS : Transportation     Lack of Transportation (Medical): No     Lack of Transportation (Non-Medical): No     Patient Unable or Declines to Respond: No   Social Connections: Feeling Socially Integrated (4/22/2025)    OASIS : Social Isolation     Frequency of experiencing loneliness or isolation: Never

## 2025-05-28 LAB
CHOLEST SERPL-MCNC: 239 MG/DL
CHOLEST/HDLC SERPL: 3.6 (CALC)
EST. AVERAGE GLUCOSE BLD GHB EST-MCNC: 120 MG/DL
EST. AVERAGE GLUCOSE BLD GHB EST-SCNC: 6.6 MMOL/L
HBA1C MFR BLD: 5.8 %
HCV AB SERPL QL IA: NORMAL
HDLC SERPL-MCNC: 66 MG/DL
LDLC SERPL CALC-MCNC: 139 MG/DL (CALC)
NONHDLC SERPL-MCNC: 173 MG/DL (CALC)
TRIGL SERPL-MCNC: 204 MG/DL
TSH SERPL-ACNC: 1.48 MIU/L (ref 0.4–4.5)

## 2025-05-29 ENCOUNTER — TELEPHONE (OUTPATIENT)
Dept: PRIMARY CARE | Facility: CLINIC | Age: 65
End: 2025-05-29
Payer: MEDICARE

## 2025-05-29 DIAGNOSIS — E78.00 HIGH CHOLESTEROL: ICD-10-CM

## 2025-05-29 DIAGNOSIS — Z79.899 MEDICATION MANAGEMENT: ICD-10-CM

## 2025-05-29 DIAGNOSIS — F51.01 PRIMARY INSOMNIA: Primary | ICD-10-CM

## 2025-05-29 RX ORDER — ATORVASTATIN CALCIUM 10 MG/1
10 TABLET, FILM COATED ORAL DAILY
Qty: 90 TABLET | Refills: 3 | Status: SHIPPED | OUTPATIENT
Start: 2025-05-29

## 2025-07-29 ENCOUNTER — OFFICE VISIT (OUTPATIENT)
Dept: PRIMARY CARE | Facility: CLINIC | Age: 65
End: 2025-07-29
Payer: MEDICARE

## 2025-07-29 VITALS
TEMPERATURE: 97.2 F | BODY MASS INDEX: 19.25 KG/M2 | HEART RATE: 79 BPM | DIASTOLIC BLOOD PRESSURE: 68 MMHG | WEIGHT: 127 LBS | HEIGHT: 68 IN | SYSTOLIC BLOOD PRESSURE: 134 MMHG | OXYGEN SATURATION: 99 %

## 2025-07-29 DIAGNOSIS — R05.9 COUGH, UNSPECIFIED TYPE: ICD-10-CM

## 2025-07-29 DIAGNOSIS — C34.11 MALIGNANT NEOPLASM OF UPPER LOBE OF RIGHT LUNG (MULTI): ICD-10-CM

## 2025-07-29 DIAGNOSIS — F41.1 GAD (GENERALIZED ANXIETY DISORDER): ICD-10-CM

## 2025-07-29 DIAGNOSIS — Z12.31 ENCOUNTER FOR SCREENING MAMMOGRAM FOR MALIGNANT NEOPLASM OF BREAST: ICD-10-CM

## 2025-07-29 DIAGNOSIS — I10 BENIGN ESSENTIAL HYPERTENSION: Primary | ICD-10-CM

## 2025-07-29 DIAGNOSIS — K21.00 GASTROESOPHAGEAL REFLUX DISEASE WITH ESOPHAGITIS WITHOUT HEMORRHAGE: ICD-10-CM

## 2025-07-29 DIAGNOSIS — G47.00 INSOMNIA, UNSPECIFIED TYPE: ICD-10-CM

## 2025-07-29 DIAGNOSIS — R07.81 RIB PAIN ON RIGHT SIDE: ICD-10-CM

## 2025-07-29 DIAGNOSIS — J44.1 COPD EXACERBATION (MULTI): ICD-10-CM

## 2025-07-29 DIAGNOSIS — Z79.899 MEDICATION MANAGEMENT: ICD-10-CM

## 2025-07-29 PROBLEM — Z09 HOSPITAL DISCHARGE FOLLOW-UP: Status: RESOLVED | Noted: 2025-01-23 | Resolved: 2025-07-29

## 2025-07-29 PROCEDURE — 3078F DIAST BP <80 MM HG: CPT | Performed by: INTERNAL MEDICINE

## 2025-07-29 PROCEDURE — 3075F SYST BP GE 130 - 139MM HG: CPT | Performed by: INTERNAL MEDICINE

## 2025-07-29 PROCEDURE — 99214 OFFICE O/P EST MOD 30 MIN: CPT | Performed by: INTERNAL MEDICINE

## 2025-07-29 PROCEDURE — G2211 COMPLEX E/M VISIT ADD ON: HCPCS | Performed by: INTERNAL MEDICINE

## 2025-07-29 PROCEDURE — 3008F BODY MASS INDEX DOCD: CPT | Performed by: INTERNAL MEDICINE

## 2025-07-29 PROCEDURE — 1160F RVW MEDS BY RX/DR IN RCRD: CPT | Performed by: INTERNAL MEDICINE

## 2025-07-29 PROCEDURE — 1159F MED LIST DOCD IN RCRD: CPT | Performed by: INTERNAL MEDICINE

## 2025-07-29 PROCEDURE — 96372 THER/PROPH/DIAG INJ SC/IM: CPT | Performed by: INTERNAL MEDICINE

## 2025-07-29 RX ORDER — ZOLPIDEM TARTRATE 10 MG/1
10 TABLET ORAL NIGHTLY PRN
Qty: 30 TABLET | Refills: 2 | Status: SHIPPED | OUTPATIENT
Start: 2025-07-29

## 2025-07-29 RX ORDER — LEVOFLOXACIN 500 MG/1
500 TABLET, FILM COATED ORAL DAILY
Qty: 8 TABLET | Refills: 0 | Status: SHIPPED | OUTPATIENT
Start: 2025-07-29 | End: 2025-08-06

## 2025-07-29 RX ORDER — PREDNISONE 20 MG/1
20 TABLET ORAL DAILY
Qty: 8 TABLET | Refills: 0 | Status: SHIPPED | OUTPATIENT
Start: 2025-07-29 | End: 2025-08-06

## 2025-07-29 RX ORDER — TRIAMCINOLONE ACETONIDE 40 MG/ML
15 INJECTION, SUSPENSION INTRA-ARTICULAR; INTRAMUSCULAR ONCE
Status: COMPLETED | OUTPATIENT
Start: 2025-07-29 | End: 2025-07-29

## 2025-07-29 RX ADMIN — TRIAMCINOLONE ACETONIDE 15 MG: 40 INJECTION, SUSPENSION INTRA-ARTICULAR; INTRAMUSCULAR at 15:37

## 2025-07-29 ASSESSMENT — PATIENT HEALTH QUESTIONNAIRE - PHQ9
2. FEELING DOWN, DEPRESSED OR HOPELESS: NOT AT ALL
SUM OF ALL RESPONSES TO PHQ9 QUESTIONS 1 AND 2: 0
1. LITTLE INTEREST OR PLEASURE IN DOING THINGS: NOT AT ALL

## 2025-07-29 NOTE — ASSESSMENT & PLAN NOTE
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

## 2025-07-29 NOTE — ASSESSMENT & PLAN NOTE
Chest x-ray CBC with differential given inhaler Levaquin prednisone watch for QTc interval tendinitis steroid psychosis follow-up

## 2025-07-29 NOTE — PROGRESS NOTES
"Subjective   Patient ID: Princess Nevarez \"Collin" is a 64 y.o. female who presents for Follow-up (FEELING FOGGY, HAS LOST HER TASTE, HURTS WHERE SHE HAD HER LOBECTOMY/) and Cough (Mucus and worried about a lung infection ).    Assessment/Plan     Problem List Items Addressed This Visit       Benign essential hypertension - Primary    Relevant Orders    Albumin-Creatinine Ratio, Urine Random    Drug Screen, Urine With Reflex to Confirmation    Comprehensive Metabolic Panel    CBC and Auto Differential    GERD (gastroesophageal reflux disease)    Insomnia    I personally reviewed the [facility] report for this patient. This report is scanned into the electronic medical record. I have considered  the risks of abuse, dependence, addiction and diversion. After discussion, patient does have a good understanding of the safety and  risks of this medication. I believe that it is clinically appropriate for this patient to be prescribed this medication.  See patient back in 6 weeks.         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 on March 24, 2025 lymph node with negative margin negative evaluated by the cardiothoracic surgery pulmonary and going to see oncologist advised to get a bone scan rule out mets         Relevant Orders    NM bone 3 phase     Other Visit Diagnoses         Encounter for screening mammogram for malignant neoplasm of breast        Relevant Orders    BI mammo bilateral screening tomosynthesis    Drug Screen, " "Urine With Reflex to Confirmation    Comprehensive Metabolic Panel    CBC and Auto Differential      Cough, unspecified type        Relevant Orders    Drug Screen, Urine With Reflex to Confirmation    Comprehensive Metabolic Panel    CBC and Auto Differential    XR chest 2 views      Medication management        Relevant Orders    Drug Screen, Urine With Reflex to Confirmation      Rib pain on right side        Relevant Orders    NM bone 3 phase            HPIPrincess Nevarez \"Edna\" is a 64 y.o. female who presents for Follow-up (FEELING FOGGY, HAS LOST HER TASTE, HURTS WHERE SHE HAD HER LOBECTOMY/) and Cough (Mucus and worried about a lung infection ).  Robotic right upper lobectomy on March 24, 2025 since then had pain affecting the ribs muscles spine on the right side evaluated by the cardiothoracic surgery and the pulmonary service pain does not get any better    Tissue diagnosis adenocarcinoma solid complex acinar pattern invading into the hilar fat tumor size with 3.1 cm in greatest dimension 1 lymph node negative for metastasis resection margin free of tumor advised to check with him at oncologist    Cancer related pain advise chest x-ray sed rate CBC with differential there is a bone scan rule out any metastasis    Given Kenalog 40 mg injections    Also diagnosis COPD exacerbation immunocompromise host given Levaquin prednisone's Mucinex and Trelegy    Problem continue does not get any better hospitalization for further care  Medical History[1]  Surgical History[2]  Allergies[3]  Current Medications[4]  Family History[5]  Social History[6]  Immunization History   Administered Date(s) Administered    Flu vaccine (IIV4), preservative free *Check age/dose* 09/17/2017, 09/02/2018, 09/07/2019, 08/16/2020, 09/10/2021, 07/30/2022, 09/21/2023    Flu vaccine, trivalent, preservative free, age 6 months and greater (Fluarix/Fluzone/Flulaval) 10/08/2016    Flu vaccine, trivalent, preservative free, no egg protein, " "age 6 months or greater (Flucelvax) 09/18/2024    Hepatitis A vaccine, age 19 years and greater (HAVRIX) 07/25/2018, 04/08/2019    Hepatitis B vaccine, 18yrs and older(HEPLISAV) 08/16/2022, 05/12/2023    Hepatitis B vaccine, adult *Check Product/Dose* 08/16/2022    Influenza, Unspecified 12/20/2011, 09/21/2012, 09/23/2013, 09/10/2021    Influenza, seasonal, injectable 08/27/2020    MMR vaccine, subcutaneous (MMR II) 08/23/2022, 07/03/2023    Moderna COVID-19 vaccine, 12 years and older (50mcg/0.5mL)(Spikevax) 09/22/2023, 09/18/2024    Moderna SARS-CoV-2 Vaccination 02/11/2021, 03/10/2021, 10/29/2021, 04/18/2022    Pfizer COVID-19 vaccine, bivalent, age 12 years and older (30 mcg/0.3 mL) 09/17/2022, 05/12/2023    Pfizer Purple Cap SARS-CoV-2 09/17/2022, 08/19/2023    Pneumococcal Conjugate PCV 7 10/31/2020    Pneumococcal conjugate vaccine, 13-valent (PREVNAR 13) 10/31/2020    Pneumococcal conjugate vaccine, 20-valent (PREVNAR 20) 07/06/2022    Pneumococcal polysaccharide vaccine, 23-valent, age 2 years and older (PNEUMOVAX 23) 12/20/2011, 09/02/2018, 07/03/2023    Poliovirus vaccine, subcutaneous (IPOL) 08/17/2022, 07/03/2023    RESPIRATORY SYNCYTIAL VIRUS (RSV), ELIGIBLE PREGNANT PTS, 0.5 ML (ABRYSVO) 08/19/2023    Td vaccine, age 7 years and older (TENIVAC) 05/12/2023    Tdap vaccine, age 7 year and older (BOOSTRIX, ADACEL) 04/14/2019    Varicella vaccine, subcutaneous (VARIVAX) 07/03/2023    Zoster vaccine, recombinant, adult (SHINGRIX) 08/16/2020, 10/31/2020       Review of Systems  Review of systems is otherwise negative unless stated above or in history of present illness.    Objective   Visit Vitals  /68 (BP Location: Left arm, Patient Position: Sitting)   Pulse 79   Temp 36.2 °C (97.2 °F)   Ht 1.727 m (5' 8\")   Wt 57.6 kg (127 lb)   SpO2 99%   BMI 19.31 kg/m²   OB Status Postmenopausal   Smoking Status Former   BSA 1.66 m²     Physical Exam  Constitutional: Cachexia of cancer     General: not in acute " distress.   HENT:      Head: Normocephalic and atraumatic.      Nose: Nose normal.   Eyes:      Extraocular Movements: Extraocular movements intact.      Conjunctiva/sclera: Conjunctivae normal.   Cardiovascular: Heart murmur     Rate and Rhythm: Normal rate ,  No M/R/G  Pulmonary: Decreased air entry     Effort: Pulmonary effort is normal.      Breath sounds: Normal, Bilat Equal AE  Skin:     General: Skin is warm.   Neurological:      Mental Status: He is alert and oriented to person, place, and time.   Psychiatric:    Panic disorder     Mood and Affect: Mood normal.         Behavior: Behavior normal.   Musculoskeletal postoperative hyperesthesia tenderness and pain right sided ribs  FROM in all extremitirs,  Joint-no swelling or tenderness    Office Visit on 05/27/2025   Component Date Value Ref Range Status    HEMOGLOBIN A1c 05/27/2025 5.8 (H)  <5.7 % Final    eAG (mg/dL) 05/27/2025 120  mg/dL Final    eAG (mmol/L) 05/27/2025 6.6  mmol/L Final    TSH 05/27/2025 1.48  0.40 - 4.50 mIU/L Final    CHOLESTEROL, TOTAL 05/27/2025 239 (H)  <200 mg/dL Final    HDL CHOLESTEROL 05/27/2025 66  > OR = 50 mg/dL Final    TRIGLYCERIDES 05/27/2025 204 (H)  <150 mg/dL Final    LDL-CHOLESTEROL 05/27/2025 139 (H)  mg/dL (calc) Final    CHOL/HDLC RATIO 05/27/2025 3.6  <5.0 (calc) Final    NON HDL CHOLESTEROL 05/27/2025 173 (H)  <130 mg/dL (calc) Final    HEPATITIS C ANTIBODY 05/27/2025 NON-REACTIVE  NON-REACTIVE Final       Radiology: Reviewed imaging in powerchart.  Imaging  No results found.    Cardiology, Vascular, and Other Imaging  No other imaging results found for the past 7 days        Charting was completed using voice recognition technology and may include unintended errors.            [1]   Past Medical History:  Diagnosis Date    Anxiety disorder, unspecified 03/19/2020    Anxiety and depression    Bipolar disorder, currently in remission, most recent episode unspecified (Multi) 03/19/2020    History of depressed bipolar  disorder    COPD (chronic obstructive pulmonary disease) (Multi)     Displaced fracture of head of unspecified radius, initial encounter for closed fracture 11/30/2021    Radial head fracture    Dorsalgia, unspecified 03/19/2020    Chronic back pain greater than 3 months duration    Encounter for screening for malignant neoplasm of colon 12/15/2020    Screen for colon cancer    Encounter for screening for malignant neoplasm of rectum 12/15/2020    Screening for rectal cancer    Essential (primary) hypertension 03/19/2020    Benign essential hypertension    Hematuria, unspecified 11/30/2021    Painless hematuria    Hypothyroidism, unspecified 03/19/2020    Acquired hypothyroidism    Nicotine dependence, cigarettes, uncomplicated 10/29/2020    Continuous dependence on cigarette smoking    Pain in right arm 03/21/2017    Arm pain, right    Pain in unspecified shoulder 03/19/2020    Chronic pain in shoulder    Personal history of other diseases of the circulatory system 03/19/2020    History of hypertension    Personal history of other diseases of the digestive system 03/19/2020    History of gastroesophageal reflux (GERD)    Personal history of other diseases of the musculoskeletal system and connective tissue 10/29/2020    History of arthritis    Personal history of other diseases of the nervous system and sense organs 11/30/2021    History of restless legs syndrome    Personal history of other diseases of the nervous system and sense organs 03/19/2020    History of restless legs syndrome    Personal history of other diseases of the nervous system and sense organs 03/19/2020    History of restless legs syndrome    Personal history of other diseases of the respiratory system 10/13/2022    History of acute bronchitis    Personal history of other diseases of the respiratory system 10/11/2022    History of acute sinusitis    Personal history of other mental and behavioral disorders 08/08/2016    History of anxiety     Personal history of other specified conditions 11/30/2021    History of insomnia    Personal history of other venous thrombosis and embolism 03/19/2020    History of deep venous thrombosis    Personal history of pulmonary embolism 03/19/2020    History of pulmonary embolism    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      albuterol 90 mcg/actuation inhaler inhale two puffs by mouth every 4 hours if needed for wheezing 8.5 g 3    atorvastatin (Lipitor) 10 mg tablet Take 1 tablet (10 mg) by mouth once daily. 90 tablet 3    clotrimazole-betamethasone (Lotrisone) cream Apply 1 Application topically 2 times a day. 45 g 1    fluticasone (Flonase) 50 mcg/actuation nasal spray Administer 1 spray into each nostril once daily. Shake gently. Before first use, prime pump. After use, clean tip and replace cap. 16 g 11    fluticasone-umeclidin-vilanter (Trelegy Ellipta) 100-62.5-25 mcg blister with device Inhale 1 puff once daily. 60 each 6    gabapentin (Neurontin) 100 mg capsule Take 1 capsule (100 mg) by mouth 3 times a day. 90 capsule 1    guaiFENesin (Mucinex) 600 mg 12 hr tablet Take 1 tablet (600 mg) by mouth once daily as needed for cough.       mg tablet TAKE 1 TABLET BY MOUTH EVERY 6 HOURS AS NEEDED FOR MODERATE PAIN (4-6.) 120 tablet 0    ipratropium-albuteroL (Duo-Neb) 0.5-2.5 mg/3 mL nebulizer solution INHALE THE  CONTENTS OF 1 VIAL (3 ML) VIA NEBULIZER EVERY 12 HOURS AS NEEDED      metoprolol succinate XL (Toprol-XL) 25 mg 24 hr tablet Take 1 tablet (25 mg) by mouth once daily. Do not crush or chew. 90 tablet 3    pantoprazole (ProtoNix) 40 mg EC tablet Take 1 tablet (40 mg) by mouth once daily. 90 tablet 3    PARoxetine (Paxil) 10 mg tablet Take 1 tablet (10 mg) by mouth once daily at bedtime. 90 tablet 3    zolpidem (Ambien) 10 mg tablet Take 1 tablet (10 mg) by mouth as needed at bedtime for sleep. 30 tablet 2     No current facility-administered medications for this visit.   [5]   Family History  Problem Relation Name Age of Onset    Hypertension Mother      Heart disease Mother      Heart attack Father     [6]   Social History  Socioeconomic History    Marital status:    Tobacco Use    Smoking status: Former     Current packs/day: 0.00     Average packs/day: 1.5 packs/day for 50.8 years (76.3 ttl pk-yrs)     Types: Cigarettes     Start date:      Quit date: 2024     Years since quittin.7    Smokeless tobacco: Never    Tobacco comments:     Quit in '   Substance and Sexual Activity    Alcohol use: Yes     Alcohol/week: 0.0 - 4.0 standard drinks of alcohol    Drug use: Never     Social Drivers of Health     Transportation Needs: No Transportation Needs (2025)    OASIS : Transportation     Lack of Transportation (Medical): No     Lack of Transportation (Non-Medical): No     Patient Unable or Declines to Respond: No   Social Connections: Feeling Socially Integrated (2025)    OASIS : Social Isolation     Frequency of experiencing loneliness or isolation: Never

## 2025-07-30 ENCOUNTER — RESULTS FOLLOW-UP (OUTPATIENT)
Dept: PRIMARY CARE | Facility: CLINIC | Age: 65
End: 2025-07-30
Payer: MEDICARE

## 2025-07-30 ENCOUNTER — TELEPHONE (OUTPATIENT)
Dept: PRIMARY CARE | Facility: CLINIC | Age: 65
End: 2025-07-30
Payer: MEDICARE

## 2025-07-30 DIAGNOSIS — C34.11 MALIGNANT NEOPLASM OF UPPER LOBE OF RIGHT LUNG (MULTI): ICD-10-CM

## 2025-07-30 LAB
ALBUMIN SERPL-MCNC: 4.1 G/DL (ref 3.6–5.1)
ALP SERPL-CCNC: 82 U/L (ref 37–153)
ALT SERPL-CCNC: 7 U/L (ref 6–29)
ANION GAP SERPL CALCULATED.4IONS-SCNC: 10 MMOL/L (CALC) (ref 7–17)
AST SERPL-CCNC: 12 U/L (ref 10–35)
BASOPHILS # BLD AUTO: 31 CELLS/UL (ref 0–200)
BASOPHILS NFR BLD AUTO: 0.3 %
BILIRUB SERPL-MCNC: 0.4 MG/DL (ref 0.2–1.2)
BUN SERPL-MCNC: 10 MG/DL (ref 7–25)
CALCIUM SERPL-MCNC: 9 MG/DL (ref 8.6–10.4)
CHLORIDE SERPL-SCNC: 105 MMOL/L (ref 98–110)
CO2 SERPL-SCNC: 24 MMOL/L (ref 20–32)
CREAT SERPL-MCNC: 0.67 MG/DL (ref 0.5–1.05)
EGFRCR SERPLBLD CKD-EPI 2021: 97 ML/MIN/1.73M2
EOSINOPHIL # BLD AUTO: 260 CELLS/UL (ref 15–500)
EOSINOPHIL NFR BLD AUTO: 2.5 %
ERYTHROCYTE [DISTWIDTH] IN BLOOD BY AUTOMATED COUNT: 12.4 % (ref 11–15)
GLUCOSE SERPL-MCNC: 97 MG/DL (ref 65–99)
HCT VFR BLD AUTO: 33.9 % (ref 35–45)
HGB BLD-MCNC: 11 G/DL (ref 11.7–15.5)
LYMPHOCYTES # BLD AUTO: 1706 CELLS/UL (ref 850–3900)
LYMPHOCYTES NFR BLD AUTO: 16.4 %
MCH RBC QN AUTO: 32.4 PG (ref 27–33)
MCHC RBC AUTO-ENTMCNC: 32.4 G/DL (ref 32–36)
MCV RBC AUTO: 100 FL (ref 80–100)
MONOCYTES # BLD AUTO: 1019 CELLS/UL (ref 200–950)
MONOCYTES NFR BLD AUTO: 9.8 %
NEUTROPHILS # BLD AUTO: 7384 CELLS/UL (ref 1500–7800)
NEUTROPHILS NFR BLD AUTO: 71 %
PLATELET # BLD AUTO: 421 THOUSAND/UL (ref 140–400)
PMV BLD REES-ECKER: 10 FL (ref 7.5–12.5)
POTASSIUM SERPL-SCNC: 4.1 MMOL/L (ref 3.5–5.3)
PROT SERPL-MCNC: 7.7 G/DL (ref 6.1–8.1)
RBC # BLD AUTO: 3.39 MILLION/UL (ref 3.8–5.1)
SODIUM SERPL-SCNC: 139 MMOL/L (ref 135–146)
WBC # BLD AUTO: 10.4 THOUSAND/UL (ref 3.8–10.8)

## 2025-07-31 ENCOUNTER — RESULTS FOLLOW-UP (OUTPATIENT)
Dept: PRIMARY CARE | Facility: CLINIC | Age: 65
End: 2025-07-31
Payer: MEDICARE

## 2025-07-31 RX ORDER — TIZANIDINE 4 MG/1
4 TABLET ORAL EVERY 8 HOURS PRN
Qty: 21 TABLET | Refills: 0 | Status: SHIPPED | OUTPATIENT
Start: 2025-07-31

## 2025-07-31 NOTE — TELEPHONE ENCOUNTER
----- Message from Frances Valentin sent at 7/30/2025  9:01 AM EDT -----  Anemia H&H 11 and 33.9 platelet 421 Slow Fe twice a day vitamin C twice a day follow-up  ----- Message -----  From: [employer] [facility] Results In  Sent: 7/30/2025   7:08 AM EDT  To: Frances Valentin MD

## 2025-07-31 NOTE — TELEPHONE ENCOUNTER
----- Message from Frances Valentin sent at 7/31/2025 10:48 AM EDT -----  Emphysema advised spirometry next visit in office  ----- Message -----  From: Jorge Thakur - Imaging Results In  Sent: 7/31/2025  10:41 AM EDT  To: Frances Valentin MD

## 2025-08-06 ENCOUNTER — TELEPHONE (OUTPATIENT)
Dept: PRIMARY CARE | Facility: CLINIC | Age: 65
End: 2025-08-06
Payer: MEDICARE

## 2025-08-06 NOTE — TELEPHONE ENCOUNTER
Nuclear radiology called, patient has an appointment tomorrow at 8:00 for a NM bone 3 phase. That exam is to not rule out a fracture. The procedure patient would need is a bone scan, routine, with the diagnosis of history of cancer. Javed from nuclear radiology wants to know if the order should be switched to a bone scan. She said a verbal order would be enough.    Fax: [phone]

## 2025-08-11 ENCOUNTER — RESULTS FOLLOW-UP (OUTPATIENT)
Dept: PRIMARY CARE | Facility: CLINIC | Age: 65
End: 2025-08-11
Payer: MEDICARE

## 2025-08-12 ENCOUNTER — APPOINTMENT (OUTPATIENT)
Dept: PRIMARY CARE | Facility: CLINIC | Age: 65
End: 2025-08-12
Payer: MEDICARE

## 2025-08-26 ENCOUNTER — APPOINTMENT (OUTPATIENT)
Dept: PRIMARY CARE | Facility: CLINIC | Age: 65
End: 2025-08-26
Payer: MEDICARE

## 2025-08-26 VITALS
TEMPERATURE: 97.3 F | SYSTOLIC BLOOD PRESSURE: 133 MMHG | HEIGHT: 68 IN | WEIGHT: 126.4 LBS | BODY MASS INDEX: 19.16 KG/M2 | HEART RATE: 75 BPM | OXYGEN SATURATION: 98 % | DIASTOLIC BLOOD PRESSURE: 77 MMHG

## 2025-08-26 DIAGNOSIS — B37.0 THRUSH, ORAL: Primary | ICD-10-CM

## 2025-08-26 DIAGNOSIS — C34.11 MALIGNANT NEOPLASM OF UPPER LOBE OF RIGHT LUNG (MULTI): ICD-10-CM

## 2025-08-26 DIAGNOSIS — J44.1 COPD EXACERBATION (MULTI): ICD-10-CM

## 2025-08-26 DIAGNOSIS — Z12.31 SCREENING MAMMOGRAM FOR BREAST CANCER: ICD-10-CM

## 2025-08-26 DIAGNOSIS — F41.1 GAD (GENERALIZED ANXIETY DISORDER): ICD-10-CM

## 2025-08-26 DIAGNOSIS — K21.00 GASTROESOPHAGEAL REFLUX DISEASE WITH ESOPHAGITIS WITHOUT HEMORRHAGE: ICD-10-CM

## 2025-08-26 DIAGNOSIS — I10 BENIGN ESSENTIAL HYPERTENSION: ICD-10-CM

## 2025-08-26 DIAGNOSIS — R07.81 RIB PAIN ON RIGHT SIDE: ICD-10-CM

## 2025-08-26 PROBLEM — G47.00 INSOMNIA: Status: RESOLVED | Noted: 2023-09-28 | Resolved: 2025-08-26

## 2025-08-26 PROBLEM — R91.1 LUNG NODULE: Status: RESOLVED | Noted: 2025-02-13 | Resolved: 2025-08-26

## 2025-08-26 PROCEDURE — 99214 OFFICE O/P EST MOD 30 MIN: CPT | Performed by: INTERNAL MEDICINE

## 2025-08-26 PROCEDURE — 3008F BODY MASS INDEX DOCD: CPT | Performed by: INTERNAL MEDICINE

## 2025-08-26 PROCEDURE — 1159F MED LIST DOCD IN RCRD: CPT | Performed by: INTERNAL MEDICINE

## 2025-08-26 PROCEDURE — 3075F SYST BP GE 130 - 139MM HG: CPT | Performed by: INTERNAL MEDICINE

## 2025-08-26 PROCEDURE — 1160F RVW MEDS BY RX/DR IN RCRD: CPT | Performed by: INTERNAL MEDICINE

## 2025-08-26 PROCEDURE — 96372 THER/PROPH/DIAG INJ SC/IM: CPT | Performed by: INTERNAL MEDICINE

## 2025-08-26 PROCEDURE — 3078F DIAST BP <80 MM HG: CPT | Performed by: INTERNAL MEDICINE

## 2025-08-26 RX ORDER — METHYLPREDNISOLONE ACETATE 40 MG/ML
40 INJECTION, SUSPENSION INTRA-ARTICULAR; INTRALESIONAL; INTRAMUSCULAR; SOFT TISSUE ONCE
Status: COMPLETED | OUTPATIENT
Start: 2025-08-26 | End: 2025-08-26

## 2025-08-26 RX ORDER — GABAPENTIN 100 MG/1
200 CAPSULE ORAL DAILY
Qty: 60 CAPSULE | Refills: 2 | Status: SHIPPED | OUTPATIENT
Start: 2025-08-26

## 2025-08-26 RX ORDER — NYSTATIN 100000 [USP'U]/ML
10 SUSPENSION ORAL 3 TIMES DAILY
Qty: 210 ML | Refills: 0 | Status: SHIPPED | OUTPATIENT
Start: 2025-08-26 | End: 2025-09-02

## 2025-08-26 RX ADMIN — METHYLPREDNISOLONE ACETATE 40 MG: 40 INJECTION, SUSPENSION INTRA-ARTICULAR; INTRALESIONAL; INTRAMUSCULAR; SOFT TISSUE at 16:26

## 2025-08-26 ASSESSMENT — PATIENT HEALTH QUESTIONNAIRE - PHQ9
SUM OF ALL RESPONSES TO PHQ9 QUESTIONS 1 AND 2: 0
2. FEELING DOWN, DEPRESSED OR HOPELESS: NOT AT ALL
1. LITTLE INTEREST OR PLEASURE IN DOING THINGS: NOT AT ALL

## 2025-08-30 DIAGNOSIS — B37.0 THRUSH, ORAL: ICD-10-CM

## 2025-09-02 RX ORDER — NYSTATIN 100000 [USP'U]/ML
SUSPENSION ORAL
Qty: 210 ML | Refills: 0 | Status: SHIPPED | OUTPATIENT
Start: 2025-09-02

## 2025-09-05 ENCOUNTER — TELEPHONE (OUTPATIENT)
Dept: PRIMARY CARE | Facility: CLINIC | Age: 65
End: 2025-09-05
Payer: MEDICARE

## 2025-12-01 ENCOUNTER — APPOINTMENT (OUTPATIENT)
Dept: PRIMARY CARE | Facility: CLINIC | Age: 65
End: 2025-12-01
Payer: MEDICARE

## (undated) DEVICE — FORCEPS, CADIERE, DAVINCI XI

## (undated) DEVICE — CATHETER, THORACIC, STRAIGHT, ADULT, 28 FR, PVC

## (undated) DEVICE — RELOAD, SUREFORM 45, 4.6 BLACK

## (undated) DEVICE — COVER, TIP HOT SHEARS ENDOWRIST

## (undated) DEVICE — SUTURE, MONOCRYL, 3-0, 18 IN, PS2, UNDYED

## (undated) DEVICE — OBTURATOR, BLADELESS , SU

## (undated) DEVICE — DRESSING, TRANSPARENT, TEGADERM, 4 X 4-3/4 IN

## (undated) DEVICE — DRAPE, INCISE, ANTIMICROBIAL, IOBAN 2, LARGE, 17 X 23 IN, DISPOSABLE, STERILE

## (undated) DEVICE — SUTURE, PROLENE, 1, 30 IN, CT-1, BLUE

## (undated) DEVICE — SUTURE, VICRYL, 2-0, 36 IN, CT-1, UNDYED

## (undated) DEVICE — RELOAD, SUREFORM 45, 4.3 GREEN

## (undated) DEVICE — MANIFOLD, 4 PORT NEPTUNE STANDARD

## (undated) DEVICE — RELOAD, SUREFORM 45, 2.5 WHITE

## (undated) DEVICE — COVER, CART, 45 X 27 X 48 IN, CLEAR

## (undated) DEVICE — KIT, ROBOTIC, CUSTOM UHC

## (undated) DEVICE — SPONGE, HEMOSTATIC, CELLULOSE, SURGICEL, NU-KNIT, 3 X 4 IN

## (undated) DEVICE — LOOP, VESSEL, MAXI, RED

## (undated) DEVICE — TUBING, SUCTION, CONNECTING, STERILE 0.25 X 120 IN., LF

## (undated) DEVICE — DRESSING, SPONGE, GAUZE, CURITY, 4 X 4 IN, STERILE

## (undated) DEVICE — TUBE SET, INSUFFLATION, SMOKE EVAC, DAVINCI

## (undated) DEVICE — CATHETER TRAY, SURESTEP, 16FR, URINE METER W/STATLOCK

## (undated) DEVICE — TOWEL, SURGICAL, NEURO, O/R, 16 X 26, BLUE, STERILE

## (undated) DEVICE — DRAPE, COLUMN, DAVINCI XI

## (undated) DEVICE — DRAPE, TIBURON, SPLIT SHEET, REINF ADH STRIP, 77X122

## (undated) DEVICE — GRASPER, FENESTRATED TIP-UP XI

## (undated) DEVICE — TROCAR, OPTICAL, BLADELESS, 12MM, THREADED, 100MM LENGTH

## (undated) DEVICE — GRASPER, LONG, BIPOLAR, DAVINCI XI

## (undated) DEVICE — GAUZE, KITTNER ROLL, STERILE

## (undated) DEVICE — SCISSORS, MONOPOLAR, CURVED, 8MM

## (undated) DEVICE — STAPLER, SUREFORM 45, CURVED TIP

## (undated) DEVICE — COLLECTION UNIT, DRAINAGE, THORACIC, SINGLE TUBE, DRY SUCTION, ATS COMPATIBLE, OASIS 3600, LF

## (undated) DEVICE — DRESSING, ADHESIVE, ISLAND, TELFA, 2 X 3.75 IN, LF

## (undated) DEVICE — DRAPE, ARM XI

## (undated) DEVICE — Device

## (undated) DEVICE — SEAL, UNIVERSAL, 5-12MM

## (undated) DEVICE — SHEET, SPLIT, CARDIOVASCULAR TIBURON

## (undated) DEVICE — SUTURE, VICRYL, 0, 27 IN, UR-6, VIOLET

## (undated) DEVICE — CATHETER, URETHRAL, ROBNEL,  8 FR, 16 IN, LF, RED

## (undated) DEVICE — ELECTRODE, ELECTROSURGICAL, BLADE, INSULATED, ENT/IMA, STERILE